# Patient Record
Sex: MALE | Race: WHITE | Employment: PART TIME | URBAN - METROPOLITAN AREA
[De-identification: names, ages, dates, MRNs, and addresses within clinical notes are randomized per-mention and may not be internally consistent; named-entity substitution may affect disease eponyms.]

---

## 2017-03-06 ENCOUNTER — HOSPITAL ENCOUNTER (OUTPATIENT)
Dept: OTHER | Age: 67
Discharge: HOME | End: 2017-03-06
Attending: SURGERY | Admitting: SURGERY

## 2017-03-06 DIAGNOSIS — N20.0 URIC ACID NEPHROLITHIASIS: ICD-10-CM

## 2017-03-16 ENCOUNTER — HOSPITAL ENCOUNTER (OUTPATIENT)
Dept: OTHER | Age: 67
Discharge: HOME | End: 2017-03-16
Attending: SURGERY | Admitting: SURGERY

## 2017-03-16 DIAGNOSIS — N20.0 CALCULUS OF KIDNEY: ICD-10-CM

## 2017-11-10 ENCOUNTER — OFFICE VISIT (OUTPATIENT)
Dept: FAMILY MEDICINE CLINIC | Age: 67
End: 2017-11-10

## 2017-11-10 VITALS
WEIGHT: 205 LBS | HEART RATE: 68 BPM | HEIGHT: 70 IN | SYSTOLIC BLOOD PRESSURE: 124 MMHG | BODY MASS INDEX: 29.35 KG/M2 | DIASTOLIC BLOOD PRESSURE: 70 MMHG | OXYGEN SATURATION: 97 % | RESPIRATION RATE: 22 BRPM | TEMPERATURE: 97.6 F

## 2017-11-10 DIAGNOSIS — Z87.438 HISTORY OF PROSTATITIS: ICD-10-CM

## 2017-11-10 DIAGNOSIS — Z87.442 HISTORY OF NEPHROLITHIASIS: ICD-10-CM

## 2017-11-10 DIAGNOSIS — E11.8 TYPE 2 DIABETES MELLITUS WITH COMPLICATION, WITHOUT LONG-TERM CURRENT USE OF INSULIN (HCC): ICD-10-CM

## 2017-11-10 DIAGNOSIS — E11.8 TYPE 2 DIABETES MELLITUS WITH COMPLICATION, WITHOUT LONG-TERM CURRENT USE OF INSULIN (HCC): Primary | ICD-10-CM

## 2017-11-10 DIAGNOSIS — Z85.828 HISTORY OF BASAL CELL CARCINOMA: ICD-10-CM

## 2017-11-10 LAB
A/G RATIO: 1.7 (ref 1.1–2.2)
ALBUMIN SERPL-MCNC: 4.7 G/DL (ref 3.4–5)
ALP BLD-CCNC: 52 U/L (ref 40–129)
ALT SERPL-CCNC: 37 U/L (ref 10–40)
ANION GAP SERPL CALCULATED.3IONS-SCNC: 16 MMOL/L (ref 3–16)
AST SERPL-CCNC: 36 U/L (ref 15–37)
BILIRUB SERPL-MCNC: 0.3 MG/DL (ref 0–1)
BUN BLDV-MCNC: 25 MG/DL (ref 7–20)
CALCIUM SERPL-MCNC: 10 MG/DL (ref 8.3–10.6)
CHLORIDE BLD-SCNC: 103 MMOL/L (ref 99–110)
CHOLESTEROL, TOTAL: 136 MG/DL (ref 0–199)
CO2: 25 MMOL/L (ref 21–32)
CREAT SERPL-MCNC: 1 MG/DL (ref 0.8–1.3)
CREATININE URINE: 117.2 MG/DL (ref 39–259)
ESTIMATED AVERAGE GLUCOSE: 162.8 MG/DL
GFR AFRICAN AMERICAN: >60
GFR NON-AFRICAN AMERICAN: >60
GLOBULIN: 2.8 G/DL
GLUCOSE BLD-MCNC: 155 MG/DL (ref 70–99)
HBA1C MFR BLD: 7.3 %
HDLC SERPL-MCNC: 50 MG/DL (ref 40–60)
LDL CHOLESTEROL CALCULATED: 56 MG/DL
MICROALBUMIN UR-MCNC: 2.2 MG/DL
MICROALBUMIN/CREAT UR-RTO: 18.8 MG/G (ref 0–30)
POTASSIUM SERPL-SCNC: 4.5 MMOL/L (ref 3.5–5.1)
SODIUM BLD-SCNC: 144 MMOL/L (ref 136–145)
TOTAL PROTEIN: 7.5 G/DL (ref 6.4–8.2)
TRIGL SERPL-MCNC: 152 MG/DL (ref 0–150)
TSH SERPL DL<=0.05 MIU/L-ACNC: 5.46 UIU/ML (ref 0.27–4.2)
VITAMIN B-12: 357 PG/ML (ref 211–911)
VLDLC SERPL CALC-MCNC: 30 MG/DL

## 2017-11-10 PROCEDURE — 99204 OFFICE O/P NEW MOD 45 MIN: CPT | Performed by: FAMILY MEDICINE

## 2017-11-10 RX ORDER — CHLORTHALIDONE 25 MG/1
25 TABLET ORAL DAILY
COMMUNITY
End: 2019-03-08 | Stop reason: SDUPTHER

## 2017-11-10 RX ORDER — TAMSULOSIN HYDROCHLORIDE 0.4 MG/1
0.4 CAPSULE ORAL DAILY
COMMUNITY
End: 2018-04-03 | Stop reason: SDUPTHER

## 2017-11-10 RX ORDER — ATORVASTATIN CALCIUM 40 MG/1
40 TABLET, FILM COATED ORAL DAILY
COMMUNITY
End: 2018-03-27 | Stop reason: SDUPTHER

## 2017-11-10 RX ORDER — GLIPIZIDE 10 MG/1
10 TABLET ORAL
COMMUNITY
End: 2018-02-21 | Stop reason: SDUPTHER

## 2017-11-10 RX ORDER — ASPIRIN 81 MG/1
81 TABLET ORAL DAILY
COMMUNITY
End: 2022-04-13

## 2017-11-10 RX ORDER — SILDENAFIL CITRATE 20 MG/1
TABLET ORAL
Qty: 30 TABLET | Refills: 3 | Status: SHIPPED | OUTPATIENT
Start: 2017-11-10 | End: 2018-09-07 | Stop reason: SDUPTHER

## 2017-11-10 RX ORDER — FINASTERIDE 5 MG/1
5 TABLET, FILM COATED ORAL DAILY
COMMUNITY
End: 2018-02-14 | Stop reason: SDUPTHER

## 2017-11-10 RX ORDER — LOSARTAN POTASSIUM 25 MG/1
25 TABLET ORAL DAILY
COMMUNITY
End: 2018-05-02 | Stop reason: SDUPTHER

## 2017-11-10 ASSESSMENT — PATIENT HEALTH QUESTIONNAIRE - PHQ9
SUM OF ALL RESPONSES TO PHQ QUESTIONS 1-9: 0
2. FEELING DOWN, DEPRESSED OR HOPELESS: 0
1. LITTLE INTEREST OR PLEASURE IN DOING THINGS: 0
SUM OF ALL RESPONSES TO PHQ9 QUESTIONS 1 & 2: 0

## 2017-11-10 NOTE — PROGRESS NOTES
10/28/2015    Flu vaccine (1) 09/01/2017    Diabetes screen  11/10/2020    Lipid screen  11/10/2022      Patient Active Problem List   Diagnosis    Cervical spine fracture (HCC)    HTN (hypertension)    History of nephrolithiasis    History of basal cell carcinoma    History of prostatitis        LABS:  Lab Results   Component Value Date    GLUCOSE 155 (H) 11/10/2017     Lab Results   Component Value Date     11/10/2017    K 4.5 11/10/2017    CREATININE 1.0 11/10/2017     Cholesterol, Total   Date Value Ref Range Status   11/10/2017 136 0 - 199 mg/dL Final     LDL Calculated   Date Value Ref Range Status   11/10/2017 56 <100 mg/dL Final     HDL   Date Value Ref Range Status   11/10/2017 50 40 - 60 mg/dL Final     Triglycerides   Date Value Ref Range Status   11/10/2017 152 (H) 0 - 150 mg/dL Final     Lab Results   Component Value Date    ALT 37 11/10/2017    AST 36 11/10/2017    ALKPHOS 52 11/10/2017    BILITOT 0.3 11/10/2017      Lab Results   Component Value Date    WBC 16.6 (H) 03/27/2014    HGB 12.7 (L) 03/27/2014    HCT 38.5 (L) 03/27/2014    MCV 87.4 03/27/2014     03/27/2014        PHYSICAL EXAM  /70 (Site: Left Arm, Position: Sitting, Cuff Size: Medium Adult)   Pulse 68   Temp 97.6 °F (36.4 °C) (Oral)   Resp 22   Ht 5' 10\" (1.778 m)   Wt 205 lb (93 kg)   SpO2 97%   BMI 29.41 kg/m²     BP Readings from Last 3 Encounters:   11/10/17 124/70   03/27/14 124/70   11/12/13 (!) 88/42       Wt Readings from Last 3 Encounters:   11/10/17 205 lb (93 kg)   03/27/14 210 lb (95.3 kg)   11/11/13 215 lb (97.5 kg)        Physical Exam   Constitutional: He is oriented to person, place, and time. He appears well-developed and well-nourished. HENT:   Head: Normocephalic and atraumatic. Eyes: EOM are normal.   Neck: Normal range of motion. Cardiovascular: Normal rate, regular rhythm and normal heart sounds.     Pulmonary/Chest: Effort normal and breath sounds normal.   Musculoskeletal:

## 2017-11-11 ENCOUNTER — TELEPHONE (OUTPATIENT)
Dept: FAMILY MEDICINE CLINIC | Age: 67
End: 2017-11-11

## 2017-11-11 PROBLEM — E11.8 TYPE 2 DIABETES MELLITUS WITH COMPLICATION, WITHOUT LONG-TERM CURRENT USE OF INSULIN (HCC): Status: ACTIVE | Noted: 2017-11-11

## 2017-11-11 ASSESSMENT — ENCOUNTER SYMPTOMS
COUGH: 0
NAUSEA: 0
SINUS PRESSURE: 0
SHORTNESS OF BREATH: 0
EYE REDNESS: 0
SORE THROAT: 0
COLOR CHANGE: 0
VOMITING: 0
DIARRHEA: 0

## 2017-11-11 NOTE — TELEPHONE ENCOUNTER
----- Message from Michael Lang MD sent at 11/10/2017  5:42 PM EST -----  Please inform pt that sildenafil rx was denied by insurance. Best option may be to pay out-of-pocket at [facility].  Thanks

## 2017-11-13 ENCOUNTER — TELEPHONE (OUTPATIENT)
Dept: FAMILY MEDICINE CLINIC | Age: 67
End: 2017-11-13

## 2017-11-13 DIAGNOSIS — E03.8 SUBCLINICAL HYPOTHYROIDISM: ICD-10-CM

## 2017-11-13 DIAGNOSIS — E11.8 TYPE 2 DIABETES MELLITUS WITH COMPLICATION, UNSPECIFIED LONG TERM INSULIN USE STATUS: Primary | ICD-10-CM

## 2017-11-13 NOTE — TELEPHONE ENCOUNTER
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 Suzi Hartley call concerning his labs.     I transferred call to Dr. Suzi Hartley

## 2017-11-15 ENCOUNTER — TELEPHONE (OUTPATIENT)
Dept: FAMILY MEDICINE CLINIC | Age: 67
End: 2017-11-15

## 2017-11-28 ENCOUNTER — TELEPHONE (OUTPATIENT)
Dept: FAMILY MEDICINE CLINIC | Age: 67
End: 2017-11-28

## 2017-11-28 NOTE — TELEPHONE ENCOUNTER
Pt needs a refill on his hydroquinone-kojic acid-retinoic acid in triamcinolone 6%-6%-0.05% cream  This needs to go to the compounding center   [address]  [phone]

## 2018-02-14 RX ORDER — FINASTERIDE 5 MG/1
5 TABLET, FILM COATED ORAL DAILY
Qty: 30 TABLET | Refills: 5 | Status: SHIPPED | OUTPATIENT
Start: 2018-02-14 | End: 2018-04-27 | Stop reason: SDUPTHER

## 2018-02-21 ENCOUNTER — TELEPHONE (OUTPATIENT)
Dept: FAMILY MEDICINE CLINIC | Age: 68
End: 2018-02-21

## 2018-02-21 RX ORDER — GLIPIZIDE 10 MG/1
10 TABLET ORAL
Qty: 60 TABLET | Refills: 5 | Status: SHIPPED | OUTPATIENT
Start: 2018-02-21 | End: 2018-04-04 | Stop reason: SDUPTHER

## 2018-03-20 ENCOUNTER — OFFICE VISIT (OUTPATIENT)
Dept: DERMATOLOGY | Age: 68
End: 2018-03-20

## 2018-03-20 DIAGNOSIS — L81.4 SOLAR LENTIGINOSIS: ICD-10-CM

## 2018-03-20 DIAGNOSIS — Z85.828 HISTORY OF BASAL CELL CARCINOMA: ICD-10-CM

## 2018-03-20 DIAGNOSIS — L57.0 AK (ACTINIC KERATOSIS): Primary | ICD-10-CM

## 2018-03-20 PROCEDURE — G8484 FLU IMMUNIZE NO ADMIN: HCPCS | Performed by: DERMATOLOGY

## 2018-03-20 PROCEDURE — 17000 DESTRUCT PREMALG LESION: CPT | Performed by: DERMATOLOGY

## 2018-03-20 PROCEDURE — 3017F COLORECTAL CA SCREEN DOC REV: CPT | Performed by: DERMATOLOGY

## 2018-03-20 PROCEDURE — 4040F PNEUMOC VAC/ADMIN/RCVD: CPT | Performed by: DERMATOLOGY

## 2018-03-20 PROCEDURE — 1123F ACP DISCUSS/DSCN MKR DOCD: CPT | Performed by: DERMATOLOGY

## 2018-03-20 PROCEDURE — 99202 OFFICE O/P NEW SF 15 MIN: CPT | Performed by: DERMATOLOGY

## 2018-03-20 PROCEDURE — G8427 DOCREV CUR MEDS BY ELIG CLIN: HCPCS | Performed by: DERMATOLOGY

## 2018-03-20 PROCEDURE — G8419 CALC BMI OUT NRM PARAM NOF/U: HCPCS | Performed by: DERMATOLOGY

## 2018-03-20 PROCEDURE — 1036F TOBACCO NON-USER: CPT | Performed by: DERMATOLOGY

## 2018-03-26 DIAGNOSIS — E11.8 TYPE 2 DIABETES MELLITUS WITH COMPLICATION, UNSPECIFIED LONG TERM INSULIN USE STATUS: ICD-10-CM

## 2018-03-27 RX ORDER — ATORVASTATIN CALCIUM 40 MG/1
40 TABLET, FILM COATED ORAL DAILY
Qty: 30 TABLET | Refills: 3 | Status: SHIPPED | OUTPATIENT
Start: 2018-03-27 | End: 2018-04-04 | Stop reason: SDUPTHER

## 2018-03-27 RX ORDER — EMPAGLIFLOZIN 10 MG/1
TABLET, FILM COATED ORAL
Qty: 30 TABLET | Refills: 3 | Status: SHIPPED | OUTPATIENT
Start: 2018-03-27 | End: 2018-04-04 | Stop reason: SDUPTHER

## 2018-04-03 RX ORDER — TAMSULOSIN HYDROCHLORIDE 0.4 MG/1
0.4 CAPSULE ORAL DAILY
Qty: 30 CAPSULE | Refills: 3 | Status: SHIPPED | OUTPATIENT
Start: 2018-04-03 | End: 2018-09-07

## 2018-04-04 DIAGNOSIS — E11.8 TYPE 2 DIABETES MELLITUS WITH COMPLICATION, UNSPECIFIED LONG TERM INSULIN USE STATUS: ICD-10-CM

## 2018-04-04 RX ORDER — GLIPIZIDE 10 MG/1
10 TABLET ORAL
Qty: 60 TABLET | Refills: 5 | Status: SHIPPED | OUTPATIENT
Start: 2018-04-04 | End: 2018-04-27 | Stop reason: SDUPTHER

## 2018-04-04 RX ORDER — ATORVASTATIN CALCIUM 40 MG/1
40 TABLET, FILM COATED ORAL DAILY
Qty: 30 TABLET | Refills: 3 | Status: SHIPPED | OUTPATIENT
Start: 2018-04-04 | End: 2018-04-27 | Stop reason: SDUPTHER

## 2018-04-27 DIAGNOSIS — E11.8 TYPE 2 DIABETES MELLITUS WITH COMPLICATION, UNSPECIFIED LONG TERM INSULIN USE STATUS: ICD-10-CM

## 2018-04-27 RX ORDER — GLIPIZIDE 10 MG/1
10 TABLET ORAL
Qty: 60 TABLET | Refills: 5 | Status: SHIPPED | OUTPATIENT
Start: 2018-04-27 | End: 2018-07-26 | Stop reason: SDUPTHER

## 2018-04-27 RX ORDER — FINASTERIDE 5 MG/1
5 TABLET, FILM COATED ORAL DAILY
Qty: 30 TABLET | Refills: 5 | Status: SHIPPED | OUTPATIENT
Start: 2018-04-27 | End: 2019-03-09 | Stop reason: SDUPTHER

## 2018-04-27 RX ORDER — ATORVASTATIN CALCIUM 40 MG/1
40 TABLET, FILM COATED ORAL DAILY
Qty: 30 TABLET | Refills: 3 | Status: SHIPPED | OUTPATIENT
Start: 2018-04-27 | End: 2018-07-23 | Stop reason: SDUPTHER

## 2018-07-24 RX ORDER — ATORVASTATIN CALCIUM 40 MG/1
40 TABLET, FILM COATED ORAL DAILY
Qty: 30 TABLET | Refills: 3 | Status: SHIPPED | OUTPATIENT
Start: 2018-07-24 | End: 2019-04-11 | Stop reason: SDUPTHER

## 2018-07-26 RX ORDER — GLIPIZIDE 10 MG/1
10 TABLET ORAL
Qty: 60 TABLET | Refills: 5 | Status: SHIPPED | OUTPATIENT
Start: 2018-07-26 | End: 2018-08-15 | Stop reason: SDUPTHER

## 2018-07-27 RX ORDER — MINOCYCLINE HYDROCHLORIDE 100 MG/1
100 TABLET ORAL 2 TIMES DAILY
Qty: 60 TABLET | Refills: 2 | Status: SHIPPED | OUTPATIENT
Start: 2018-07-27 | End: 2019-03-08 | Stop reason: CLARIF

## 2018-08-15 RX ORDER — GLIPIZIDE 10 MG/1
10 TABLET ORAL
Qty: 60 TABLET | Refills: 5 | Status: SHIPPED | OUTPATIENT
Start: 2018-08-15 | End: 2019-02-06 | Stop reason: SDUPTHER

## 2018-08-21 ENCOUNTER — TELEPHONE (OUTPATIENT)
Dept: DERMATOLOGY | Age: 68
End: 2018-08-21

## 2018-08-21 NOTE — TELEPHONE ENCOUNTER
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 He said that it will start to heal and then open back up again. He is wondering when Dr. Almita Chavarria would be able to see him? He does practice down the street on Monday, Wednesday and Thursday and he is more open on Tuesday and Friday but he will look over his schedule if you have times in mind. You can reach him at [phone].

## 2018-08-28 ENCOUNTER — OFFICE VISIT (OUTPATIENT)
Dept: DERMATOLOGY | Age: 68
End: 2018-08-28

## 2018-08-28 DIAGNOSIS — D48.5 NEOPLASM OF UNCERTAIN BEHAVIOR OF SKIN: Primary | ICD-10-CM

## 2018-08-28 DIAGNOSIS — L57.0 AK (ACTINIC KERATOSIS): ICD-10-CM

## 2018-08-28 PROCEDURE — 11100 PR BIOPSY OF SKIN LESION: CPT | Performed by: DERMATOLOGY

## 2018-08-28 PROCEDURE — 17003 DESTRUCT PREMALG LES 2-14: CPT | Performed by: DERMATOLOGY

## 2018-08-28 PROCEDURE — 17000 DESTRUCT PREMALG LESION: CPT | Performed by: DERMATOLOGY

## 2018-08-28 NOTE — PATIENT INSTRUCTIONS

## 2018-08-28 NOTE — PROGRESS NOTES
[facility]  MD [address]  1950    79 y.o. male     Date of Visit: 8/28/2018    Chief Complaint: skin lesions    History of Present Illness:    1. He presents today for a few month history of a recurrently bleeding and scabbing lesion on the nasal tip. 2.  Unknown duration of persistent scaly lesions on the face. 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 Has a home in [place]. Derm Hx:  Nodular BCC of the left nasal alatreated with Mohs by Dr. Joseph Guillen in April 2014. Superficial BCC of the right shouldertreated with curettage in April 2014. BCC of the right mid cheektreated with Mohs by Dr. Joseph Guillen in Sept 2012. Pigmented nodular BCC the right mandibletreated with Mohs in Dec 2011. Superficial BCC of the right cheektreated with Mohs by Dr. Joseph Guillen in December 2009. Review of Systems:  Skin: No new or changing moles. Past Medical History, Family History, Surgical History, Medications and Allergies reviewed.     Past Medical History:   Diagnosis Date    Fracture cervical vertebra-closed ([facility] [address] 3/16/13     Past Surgical History:   Procedure Laterality Date    HERNIA REPAIR      TONSILLECTOMY         No Known Allergies  Outpatient Prescriptions Marked as Taking for the 8/28/18 encounter (Office Visit) with Urbano Najjar, MD   Medication Sig Dispense Refill    VITAMIN A PO Take by mouth      GLUCOSAMINE-CHONDROITIN PO Take by mouth      glipiZIDE (GLUCOTROL) 10 MG tablet Take 1 tablet by mouth 2 times daily (before meals) 60 tablet 5    minocycline (DYNACIN) 100 MG tablet Take 1 tablet by mouth 2 times daily 60 tablet 2    atorvastatin (LIPITOR) 40 MG tablet Take 1 tablet by mouth daily 30 tablet 3    losartan (COZAAR) 25 MG tablet Take 1 tablet by mouth daily 90 tablet 1    finasteride (PROSCAR) 5 MG tablet Take 1 tablet by mouth daily 30 tablet 5    empagliflozin (JARDIANCE) 10 MG tablet TAKE ONE TABLET BY MOUTH ONCE DAILY 30 tablet 3    sitaGLIPtan-metformin (JANUMET)  MG per tablet Take 1 tablet by mouth 2 times daily (with meals) 60 tablet 5    tamsulosin (FLOMAX) 0.4 MG capsule Take 1 capsule by mouth daily 30 capsule 3    aspirin EC 81 MG EC tablet Take 81 mg by mouth daily      HYDROQUINONE EX Apply topically      chlorthalidone (HYGROTON) 25 MG tablet Take 25 mg by mouth daily      sildenafil (REVATIO) 20 MG tablet Use daily PRN intercourse 30 tablet 3    Cholecalciferol (VITAMIN D-3) 5000 UNITS TABS Take 1 tablet by mouth daily.  vitamin E 1000 UNITS capsule Take 1,000 Units by mouth daily.  ibuprofen (ADVIL;MOTRIN) 200 MG tablet Take 600 mg by mouth every 8 hours as needed for Pain or Fever. 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 Physical Examination       Well appearing. 1.  Right nasal tip - 6 mm ulcerated telangiectatic pearly papule. 2.  Right temple - 1, right forehead - 2, central upper forehead - 2, left side of the forehead - 3: ill defined irreg shaped keratotic pink macules. Assessment and Plan     1. Neoplasm of uncertain behavior of skin, right nasal tip - r/o BCC    Discussed possible diagnosis; patient agreeable to biopsy (verbal consent obtained). The area(s) to be biopsied were marked with a surgical pen. Alcohol was used to cleanse the site. Local anesthesia was acheived with 1% lidocaine with epinephrine. Shave biopsy was performed using a razor blade. Hemostasis was achieved with aluminum chloride. The wound(s) were dressed with petrolatum and covered with a bandage. Wound care instructions were reviewed. 1 Specimen (s) sent to pathology. The specimen bottles were appropriately labeled. We also reviewed the risks of bleeding, scar, and infection. Mohs w/ Vidales if positive.        2. AK (actinic keratosis) -     2 cycles of liquid nitrogen applied to 8 AKs: Right temple - 1,

## 2018-09-04 DIAGNOSIS — C44.311 BASAL CELL CARCINOMA (BCC) OF NASAL TIP: Primary | ICD-10-CM

## 2018-09-07 ENCOUNTER — OFFICE VISIT (OUTPATIENT)
Dept: FAMILY MEDICINE CLINIC | Age: 68
End: 2018-09-07

## 2018-09-07 VITALS
WEIGHT: 203 LBS | DIASTOLIC BLOOD PRESSURE: 70 MMHG | SYSTOLIC BLOOD PRESSURE: 116 MMHG | TEMPERATURE: 98.8 F | BODY MASS INDEX: 29.06 KG/M2 | HEART RATE: 63 BPM | HEIGHT: 70 IN | OXYGEN SATURATION: 97 % | RESPIRATION RATE: 15 BRPM

## 2018-09-07 DIAGNOSIS — E11.8 TYPE 2 DIABETES MELLITUS WITH COMPLICATION, WITHOUT LONG-TERM CURRENT USE OF INSULIN (HCC): ICD-10-CM

## 2018-09-07 DIAGNOSIS — N52.8 OTHER MALE ERECTILE DYSFUNCTION: ICD-10-CM

## 2018-09-07 DIAGNOSIS — I10 ESSENTIAL HYPERTENSION: ICD-10-CM

## 2018-09-07 DIAGNOSIS — N40.0 BENIGN PROSTATIC HYPERPLASIA WITHOUT LOWER URINARY TRACT SYMPTOMS: ICD-10-CM

## 2018-09-07 DIAGNOSIS — E11.8 TYPE 2 DIABETES MELLITUS WITH COMPLICATION, WITHOUT LONG-TERM CURRENT USE OF INSULIN (HCC): Primary | ICD-10-CM

## 2018-09-07 LAB
A/G RATIO: 1.8 (ref 1.1–2.2)
ALBUMIN SERPL-MCNC: 4.7 G/DL (ref 3.4–5)
ALP BLD-CCNC: 49 U/L (ref 40–129)
ALT SERPL-CCNC: 33 U/L (ref 10–40)
ANION GAP SERPL CALCULATED.3IONS-SCNC: 15 MMOL/L (ref 3–16)
AST SERPL-CCNC: 33 U/L (ref 15–37)
BASOPHILS ABSOLUTE: 0 K/UL (ref 0–0.2)
BASOPHILS RELATIVE PERCENT: 0.4 %
BILIRUB SERPL-MCNC: 0.5 MG/DL (ref 0–1)
BUN BLDV-MCNC: 27 MG/DL (ref 7–20)
CALCIUM SERPL-MCNC: 9.7 MG/DL (ref 8.3–10.6)
CHLORIDE BLD-SCNC: 105 MMOL/L (ref 99–110)
CHOLESTEROL, TOTAL: 133 MG/DL (ref 0–199)
CO2: 22 MMOL/L (ref 21–32)
CREAT SERPL-MCNC: 1 MG/DL (ref 0.8–1.3)
CREATININE URINE: 101.6 MG/DL (ref 39–259)
EOSINOPHILS ABSOLUTE: 0.3 K/UL (ref 0–0.6)
EOSINOPHILS RELATIVE PERCENT: 4.1 %
GFR AFRICAN AMERICAN: >60
GFR NON-AFRICAN AMERICAN: >60
GLOBULIN: 2.6 G/DL
GLUCOSE BLD-MCNC: 152 MG/DL (ref 70–99)
HBA1C MFR BLD: 7.3 %
HCT VFR BLD CALC: 43.4 % (ref 40.5–52.5)
HDLC SERPL-MCNC: 46 MG/DL (ref 40–60)
HEMOGLOBIN: 14.7 G/DL (ref 13.5–17.5)
LDL CHOLESTEROL CALCULATED: 62 MG/DL
LYMPHOCYTES ABSOLUTE: 1 K/UL (ref 1–5.1)
LYMPHOCYTES RELATIVE PERCENT: 15.5 %
MCH RBC QN AUTO: 30.5 PG (ref 26–34)
MCHC RBC AUTO-ENTMCNC: 33.8 G/DL (ref 31–36)
MCV RBC AUTO: 90.2 FL (ref 80–100)
MICROALBUMIN UR-MCNC: <1.2 MG/DL
MICROALBUMIN/CREAT UR-RTO: NORMAL MG/G (ref 0–30)
MONOCYTES ABSOLUTE: 0.6 K/UL (ref 0–1.3)
MONOCYTES RELATIVE PERCENT: 9 %
NEUTROPHILS ABSOLUTE: 4.7 K/UL (ref 1.7–7.7)
NEUTROPHILS RELATIVE PERCENT: 71 %
PDW BLD-RTO: 15 % (ref 12.4–15.4)
PLATELET # BLD: 158 K/UL (ref 135–450)
PMV BLD AUTO: 8.9 FL (ref 5–10.5)
POTASSIUM SERPL-SCNC: 4.3 MMOL/L (ref 3.5–5.1)
PROSTATE SPECIFIC ANTIGEN: 0.67 NG/ML (ref 0–4)
RBC # BLD: 4.82 M/UL (ref 4.2–5.9)
SODIUM BLD-SCNC: 142 MMOL/L (ref 136–145)
TOTAL PROTEIN: 7.3 G/DL (ref 6.4–8.2)
TRIGL SERPL-MCNC: 123 MG/DL (ref 0–150)
TSH SERPL DL<=0.05 MIU/L-ACNC: 3.28 UIU/ML (ref 0.27–4.2)
VLDLC SERPL CALC-MCNC: 25 MG/DL
WBC # BLD: 6.6 K/UL (ref 4–11)

## 2018-09-07 PROCEDURE — 3045F PR MOST RECENT HEMOGLOBIN A1C LEVEL 7.0-9.0%: CPT | Performed by: FAMILY MEDICINE

## 2018-09-07 PROCEDURE — 4040F PNEUMOC VAC/ADMIN/RCVD: CPT | Performed by: FAMILY MEDICINE

## 2018-09-07 PROCEDURE — 2022F DILAT RTA XM EVC RTNOPTHY: CPT | Performed by: FAMILY MEDICINE

## 2018-09-07 PROCEDURE — 1101F PT FALLS ASSESS-DOCD LE1/YR: CPT | Performed by: FAMILY MEDICINE

## 2018-09-07 PROCEDURE — 3017F COLORECTAL CA SCREEN DOC REV: CPT | Performed by: FAMILY MEDICINE

## 2018-09-07 PROCEDURE — 1123F ACP DISCUSS/DSCN MKR DOCD: CPT | Performed by: FAMILY MEDICINE

## 2018-09-07 PROCEDURE — 99214 OFFICE O/P EST MOD 30 MIN: CPT | Performed by: FAMILY MEDICINE

## 2018-09-07 PROCEDURE — G8419 CALC BMI OUT NRM PARAM NOF/U: HCPCS | Performed by: FAMILY MEDICINE

## 2018-09-07 PROCEDURE — G8427 DOCREV CUR MEDS BY ELIG CLIN: HCPCS | Performed by: FAMILY MEDICINE

## 2018-09-07 PROCEDURE — 83036 HEMOGLOBIN GLYCOSYLATED A1C: CPT | Performed by: FAMILY MEDICINE

## 2018-09-07 PROCEDURE — 1036F TOBACCO NON-USER: CPT | Performed by: FAMILY MEDICINE

## 2018-09-07 RX ORDER — SILDENAFIL CITRATE 20 MG/1
TABLET ORAL
Qty: 90 TABLET | Refills: 3
Start: 2018-09-07 | End: 2019-09-06 | Stop reason: SDUPTHER

## 2018-09-07 ASSESSMENT — ENCOUNTER SYMPTOMS
SHORTNESS OF BREATH: 0
CONSTIPATION: 0
DIARRHEA: 0
COUGH: 0

## 2018-09-07 NOTE — PROGRESS NOTES
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     No Known Allergies    LABS:  Lab Results   Component Value Date    GLUCOSE 155 (H) 11/10/2017     Lab Results   Component Value Date     11/10/2017    K 4.5 11/10/2017    CREATININE 1.0 11/10/2017     Cholesterol, Total   Date Value Ref Range Status   11/10/2017 136 0 - 199 mg/dL Final     LDL Calculated   Date Value Ref Range Status   11/10/2017 56 <100 mg/dL Final     HDL   Date Value Ref Range Status   11/10/2017 50 40 - 60 mg/dL Final     Triglycerides   Date Value Ref Range Status   11/10/2017 152 (H) 0 - 150 mg/dL Final     Lab Results   Component Value Date    ALT 37 11/10/2017    AST 36 11/10/2017    ALKPHOS 52 11/10/2017    BILITOT 0.3 11/10/2017      Lab Results   Component Value Date    WBC 6.6 09/07/2018    HGB 14.7 09/07/2018    HCT 43.4 09/07/2018    MCV 90.2 09/07/2018     09/07/2018     TSH (uIU/mL)   Date Value   11/10/2017 5.46 (H)     Lab Results   Component Value Date    LABA1C 7.3 09/07/2018     No results found for: PSA, PSADIA      PHYSICAL EXAM  /70 (Site: Right Upper Arm, Position: Sitting, Cuff Size: Large Adult)   Pulse 63   Temp 98.8 °F (37.1 °C) (Oral)   Resp 15   Ht 5' 10\" (1.778 m)   Wt 203 lb (92.1 kg)   SpO2 97%   BMI 29.13 kg/m²     BP Readings from Last 5 Encounters:   09/07/18 116/70   11/10/17 124/70   03/27/14 124/70   11/12/13 (!) 88/42   03/18/13 176/86       Wt Readings from Last 5 Encounters:   09/07/18 203 lb (92.1 kg)   11/10/17 205 lb (93 kg)   03/27/14 210 lb (95.3 kg)   11/11/13 215 lb (97.5 kg)   03/16/13 218 lb (98.9 kg)        Physical Exam   Constitutional: He is oriented to person, place, and time. He appears well-developed and well-nourished. HENT:   Head: Normocephalic and atraumatic. Eyes: EOM are normal.   Neck: Normal range of motion. Cardiovascular: Normal rate, regular rhythm and normal heart sounds.     Pulmonary/Chest:

## 2018-09-11 LAB
SEX HORMONE BINDING GLOBULIN: 42 NMOL/L (ref 11–80)
TESTOSTERONE FREE-NONMALE: 45.6 PG/ML (ref 47–244)
TESTOSTERONE TOTAL: 272 NG/DL (ref 220–1000)

## 2018-10-02 ENCOUNTER — PROCEDURE VISIT (OUTPATIENT)
Dept: SURGERY | Age: 68
End: 2018-10-02
Payer: MEDICARE

## 2018-10-02 VITALS
SYSTOLIC BLOOD PRESSURE: 138 MMHG | WEIGHT: 202 LBS | HEART RATE: 64 BPM | DIASTOLIC BLOOD PRESSURE: 78 MMHG | OXYGEN SATURATION: 97 % | TEMPERATURE: 98.3 F | BODY MASS INDEX: 28.98 KG/M2

## 2018-10-02 DIAGNOSIS — C44.311 BASAL CELL CARCINOMA OF RIGHT NASAL TIP: Primary | ICD-10-CM

## 2018-10-02 PROCEDURE — 17311 MOHS 1 STAGE H/N/HF/G: CPT | Performed by: DERMATOLOGY

## 2018-10-02 PROCEDURE — 14060 TIS TRNFR E/N/E/L 10 SQ CM/<: CPT | Performed by: DERMATOLOGY

## 2018-10-02 NOTE — PATIENT INSTRUCTIONS
[facility] Office Hours:    Monday-Thursday  7:30 AM-4:30 PM    Friday  9:00 AM-3:00 PM    POST-OPERATIVE CARE FOR STITCHES  Bandage change in 48 hours    CARING FOR YOUR SURGICAL SITE  Keep our bandage on and dry for the first 48 hours. Do not get the bandage wet. 1.  After 48 hours, gently remove the remaining part of the bandage. It can be helpful to moisten the bandage edges in the shower. 2.  Gently clean the wound daily with mild soap and water. Try to clean off crust and debris. 3.  Dry (pat) the area with a clean Q-tip or gauze. 4.  Apply a layer of Vaseline (or Bacitracin if your doctor recommends) to the wound area only. 5.  Cut a piece of Telfa (or any non-stick dressing) to fit just over the wound and secure it with paper tape. If the wound is small you may use a Band-Aid. If the dressing comes off or if you have questions, or concerns about the dressing, please call the office for instructions! POST-OPERATIVE INSTRUCTIONS        1. Activity: Do not lift anything heavier than a gallon of milk for 1 week. Also, avoid strenuous activity such as running, power walking or contact sports. 2.  Eating and drinking: Do not drink alcohol for 48 hours after your procedure. Alcohol increases the chances of bleeding. 3.  Medicines                -If you have discomfort, take acetaminophen (Tylenol or Extra Strength Tylenol). Follow the instructions and warning on the bottle. -If your doctor has prescribed you an aspirin daily, please keep taking it. Do not take extra aspirin or medicines containing aspirin (such as Yasmine-Ashley and Excedrin) for 48 hours after your procedure. ? Bleeding: If bleeding occurs, DO NOT remove the bandage. Put firm pressure on the area with gauze for 20 minutes without peaking. If the bleeding continues, apply pressure for 20 minutes more.   ? If the bleeding does not stop after you apply pressure, call us right away. If you cant call, go to the nearest emergency room or urgent care facility. What to Expect:  You may have these symptoms. They are normal and should get better with time:        1. Swelling. Swelling usually increases for 24 to 48 hours after your procedure and then begins to improve. Some soreness and redness around your wound. 2.  Bruising, which could last 1 week or more. 3.  Pink and bumpy appearance to the scar. This may happen a few weeks after your  procedure. After 4 weeks, you may gently massage the area each day with a facial moisturizer or petroleum jelly (Vaseline) or Aquaphor. This will help to                  smooth the skin and improve the appearance of the scar. The color of your scar will fade over time, but may be pink for several months after the procedure. The scar may take 6 months to 1 year to reach its final color and appearance. 4.  Spitting suture. Occasionally, an inside suture (stitch) does not completely dissolve. When this happens, (generally 4-8 weeks after surgery), it causes a bump or pimple to form on the scar. This is easily removed and is not at all                     serious. It does not mean the skin cancer has returned. Contact us if it happens, but do not be alarmed. Vitamin E oil is NOT necessary. A good moisturizer is just as effective. Sunscreen IS necessary. Use at least an SPF 30 sunscreen daily- even in the winter.      Call us at [phone] right away if you have any of the following symptoms:   Bleeding that you cant stop (see above)   Pain that lasts longer than 48 hours   Your wound becomes more painful, red or hot   Bruising and swelling that does not improve within 48 hours or gets worse suddenly

## 2018-10-03 ENCOUNTER — TELEPHONE (OUTPATIENT)
Dept: SURGERY | Age: 68
End: 2018-10-03

## 2018-10-09 ENCOUNTER — OFFICE VISIT (OUTPATIENT)
Dept: SURGERY | Age: 68
End: 2018-10-09

## 2018-10-09 DIAGNOSIS — Z48.02 VISIT FOR SUTURE REMOVAL: Primary | ICD-10-CM

## 2018-10-09 PROCEDURE — 99024 POSTOP FOLLOW-UP VISIT: CPT | Performed by: DERMATOLOGY

## 2018-10-23 ENCOUNTER — OFFICE VISIT (OUTPATIENT)
Dept: SURGERY | Age: 68
End: 2018-10-23

## 2018-10-23 DIAGNOSIS — Z51.89 VISIT FOR WOUND CHECK: Primary | ICD-10-CM

## 2018-10-23 PROCEDURE — 99024 POSTOP FOLLOW-UP VISIT: CPT | Performed by: DERMATOLOGY

## 2018-11-06 ENCOUNTER — TELEPHONE (OUTPATIENT)
Dept: FAMILY MEDICINE CLINIC | Age: 68
End: 2018-11-06

## 2018-11-20 ENCOUNTER — OFFICE VISIT (OUTPATIENT)
Dept: SURGERY | Age: 68
End: 2018-11-20

## 2018-11-20 DIAGNOSIS — Z51.89 VISIT FOR WOUND CHECK: Primary | ICD-10-CM

## 2018-11-20 PROCEDURE — 99024 POSTOP FOLLOW-UP VISIT: CPT | Performed by: DERMATOLOGY

## 2018-11-20 NOTE — PATIENT INSTRUCTIONS
[facility] Office Hours:    Monday-Thursday  7:30 AM-4:30 PM    Friday  9:00 AM-3:00 PM      Wound Care Massaging Instruction    Starting at approximately two weeks following surgery, we often ask that our patients begin massaging their wound on a regular basis. We suggest 5 minutes every morning and 5 minutes every night using an emollient such as Aquaphor, Vaseline or Eucerin cream.  The sutures that were placed underneath the surface of the skin take about 70 days to dissolve, and during that time, they can cause lumps along the line of the scar. These lumps will eventually go away on their own, but the use of regular massage will help speed the process as well as help soften the scar tissue more quickly. Please continue to use sunscreen, SPF 45 or higher during this time.

## 2018-12-04 RX ORDER — LOSARTAN POTASSIUM 25 MG/1
TABLET ORAL
Qty: 90 TABLET | Refills: 1 | Status: SHIPPED | OUTPATIENT
Start: 2018-12-04 | End: 2019-06-14 | Stop reason: SDUPTHER

## 2018-12-18 ENCOUNTER — OFFICE VISIT (OUTPATIENT)
Dept: DERMATOLOGY | Age: 68
End: 2018-12-18
Payer: MEDICARE

## 2018-12-18 DIAGNOSIS — Z85.828 HISTORY OF BASAL CELL CARCINOMA (BCC): ICD-10-CM

## 2018-12-18 DIAGNOSIS — L85.3 XEROSIS CUTIS: Primary | ICD-10-CM

## 2018-12-18 DIAGNOSIS — L81.4 SOLAR LENTIGO: ICD-10-CM

## 2018-12-18 DIAGNOSIS — L57.0 AK (ACTINIC KERATOSIS): ICD-10-CM

## 2018-12-18 PROCEDURE — 99213 OFFICE O/P EST LOW 20 MIN: CPT | Performed by: DERMATOLOGY

## 2018-12-18 PROCEDURE — G8427 DOCREV CUR MEDS BY ELIG CLIN: HCPCS | Performed by: DERMATOLOGY

## 2018-12-18 PROCEDURE — 1036F TOBACCO NON-USER: CPT | Performed by: DERMATOLOGY

## 2018-12-18 PROCEDURE — 1123F ACP DISCUSS/DSCN MKR DOCD: CPT | Performed by: DERMATOLOGY

## 2018-12-18 PROCEDURE — 1101F PT FALLS ASSESS-DOCD LE1/YR: CPT | Performed by: DERMATOLOGY

## 2018-12-18 PROCEDURE — G8484 FLU IMMUNIZE NO ADMIN: HCPCS | Performed by: DERMATOLOGY

## 2018-12-18 PROCEDURE — 4040F PNEUMOC VAC/ADMIN/RCVD: CPT | Performed by: DERMATOLOGY

## 2018-12-18 PROCEDURE — G8419 CALC BMI OUT NRM PARAM NOF/U: HCPCS | Performed by: DERMATOLOGY

## 2018-12-18 PROCEDURE — 3017F COLORECTAL CA SCREEN DOC REV: CPT | Performed by: DERMATOLOGY

## 2018-12-18 NOTE — PROGRESS NOTES
25 MG tablet Take 25 mg by mouth daily 30 tablet 11    VITAMIN A PO Take by mouth      GLUCOSAMINE-CHONDROITIN PO Take by mouth      glipiZIDE (GLUCOTROL) 10 MG tablet Take 1 tablet by mouth 2 times daily (before meals) 60 tablet 5    minocycline (DYNACIN) 100 MG tablet Take 1 tablet by mouth 2 times daily 60 tablet 2    atorvastatin (LIPITOR) 40 MG tablet Take 1 tablet by mouth daily 30 tablet 3    finasteride (PROSCAR) 5 MG tablet Take 1 tablet by mouth daily 30 tablet 5    sitaGLIPtan-metformin (JANUMET)  MG per tablet Take 1 tablet by mouth 2 times daily (with meals) 60 tablet 5    aspirin EC 81 MG EC tablet Take 81 mg by mouth daily      HYDROQUINONE EX Apply topically      chlorthalidone (HYGROTON) 25 MG tablet Take 25 mg by mouth daily      Cholecalciferol (VITAMIN D-3) 5000 UNITS TABS Take 1 tablet by mouth daily.  vitamin E 1000 UNITS capsule Take 1,000 Units by mouth daily.  ibuprofen (ADVIL;MOTRIN) 200 MG tablet Take 600 mg by mouth every 8 hours as needed for Pain or Fever. Physical Examination       The following were examined and determined to be normal: Psych/Neuro, Scalp/hair, Head/face, Conjunctivae/eyelids, Gums/teeth/lips, Neck, Breast/axilla/chest and Nails/digits. The following were examined and determined to be abnormal: Abdomen, Back, RUE and LUE. Well appearing. 1.  Flanks, upper arms, back - mild scaling. 2.  Left temple - scaly tan pink macule. 3.  Shoulders - smooth slightly irreg shaped brown macules and patches. 4.  Nasal tip region - surgical scar. Assessment and Plan     1. Xerosis cutis     We discussed dry skin care measures including: use of a mild soap (like Dove, Olay or Cetaphil) and limiting use to intertriginous regions; addition of a moisturizer (preferrably cream based) to the trunk and extremities immediately after showering. 2. AK (actinic keratosis) - small and asymptomatic    Observe.       3. Solar

## 2019-01-30 ENCOUNTER — TELEPHONE (OUTPATIENT)
Dept: DERMATOLOGY | Age: 69
End: 2019-01-30

## 2019-02-04 RX ORDER — SITAGLIPTIN AND METFORMIN HYDROCHLORIDE 1000; 50 MG/1; MG/1
TABLET, FILM COATED ORAL
Qty: 60 TABLET | Refills: 5 | Status: SHIPPED | OUTPATIENT
Start: 2019-02-04 | End: 2019-08-12 | Stop reason: SDUPTHER

## 2019-02-06 RX ORDER — GLIPIZIDE 10 MG/1
10 TABLET ORAL
Qty: 60 TABLET | Refills: 5 | Status: SHIPPED | OUTPATIENT
Start: 2019-02-06 | End: 2019-12-23

## 2019-03-08 ENCOUNTER — OFFICE VISIT (OUTPATIENT)
Dept: FAMILY MEDICINE CLINIC | Age: 69
End: 2019-03-08
Payer: MEDICARE

## 2019-03-08 VITALS
TEMPERATURE: 99.1 F | OXYGEN SATURATION: 98 % | HEIGHT: 70 IN | DIASTOLIC BLOOD PRESSURE: 74 MMHG | HEART RATE: 77 BPM | BODY MASS INDEX: 29.55 KG/M2 | SYSTOLIC BLOOD PRESSURE: 116 MMHG | WEIGHT: 206.4 LBS | RESPIRATION RATE: 14 BRPM

## 2019-03-08 DIAGNOSIS — Z87.438 HISTORY OF PROSTATITIS: ICD-10-CM

## 2019-03-08 DIAGNOSIS — N52.8 OTHER MALE ERECTILE DYSFUNCTION: ICD-10-CM

## 2019-03-08 DIAGNOSIS — Z87.442 HISTORY OF NEPHROLITHIASIS: ICD-10-CM

## 2019-03-08 DIAGNOSIS — R35.0 URINARY FREQUENCY: ICD-10-CM

## 2019-03-08 DIAGNOSIS — R35.1 BENIGN PROSTATIC HYPERPLASIA WITH NOCTURIA: ICD-10-CM

## 2019-03-08 DIAGNOSIS — N40.1 BENIGN PROSTATIC HYPERPLASIA WITH NOCTURIA: ICD-10-CM

## 2019-03-08 DIAGNOSIS — L71.9 ROSACEA: ICD-10-CM

## 2019-03-08 DIAGNOSIS — E11.8 TYPE 2 DIABETES MELLITUS WITH COMPLICATION, WITHOUT LONG-TERM CURRENT USE OF INSULIN (HCC): Primary | ICD-10-CM

## 2019-03-08 DIAGNOSIS — Z23 NEED FOR PROPHYLACTIC VACCINATION AGAINST STREPTOCOCCUS PNEUMONIAE (PNEUMOCOCCUS): ICD-10-CM

## 2019-03-08 DIAGNOSIS — I10 ESSENTIAL HYPERTENSION: ICD-10-CM

## 2019-03-08 PROBLEM — Z48.02 VISIT FOR SUTURE REMOVAL: Status: RESOLVED | Noted: 2018-10-09 | Resolved: 2019-03-08

## 2019-03-08 PROBLEM — Z51.89 VISIT FOR WOUND CHECK: Status: RESOLVED | Noted: 2018-10-23 | Resolved: 2019-03-08

## 2019-03-08 LAB — HBA1C MFR BLD: 6.8 %

## 2019-03-08 PROCEDURE — 1101F PT FALLS ASSESS-DOCD LE1/YR: CPT | Performed by: FAMILY MEDICINE

## 2019-03-08 PROCEDURE — G8419 CALC BMI OUT NRM PARAM NOF/U: HCPCS | Performed by: FAMILY MEDICINE

## 2019-03-08 PROCEDURE — 83036 HEMOGLOBIN GLYCOSYLATED A1C: CPT | Performed by: FAMILY MEDICINE

## 2019-03-08 PROCEDURE — 90732 PPSV23 VACC 2 YRS+ SUBQ/IM: CPT | Performed by: FAMILY MEDICINE

## 2019-03-08 PROCEDURE — 3044F HG A1C LEVEL LT 7.0%: CPT | Performed by: FAMILY MEDICINE

## 2019-03-08 PROCEDURE — G8484 FLU IMMUNIZE NO ADMIN: HCPCS | Performed by: FAMILY MEDICINE

## 2019-03-08 PROCEDURE — 99214 OFFICE O/P EST MOD 30 MIN: CPT | Performed by: FAMILY MEDICINE

## 2019-03-08 PROCEDURE — G8427 DOCREV CUR MEDS BY ELIG CLIN: HCPCS | Performed by: FAMILY MEDICINE

## 2019-03-08 PROCEDURE — 4040F PNEUMOC VAC/ADMIN/RCVD: CPT | Performed by: FAMILY MEDICINE

## 2019-03-08 PROCEDURE — 1036F TOBACCO NON-USER: CPT | Performed by: FAMILY MEDICINE

## 2019-03-08 PROCEDURE — 3017F COLORECTAL CA SCREEN DOC REV: CPT | Performed by: FAMILY MEDICINE

## 2019-03-08 PROCEDURE — 2022F DILAT RTA XM EVC RTNOPTHY: CPT | Performed by: FAMILY MEDICINE

## 2019-03-08 PROCEDURE — 1123F ACP DISCUSS/DSCN MKR DOCD: CPT | Performed by: FAMILY MEDICINE

## 2019-03-08 PROCEDURE — G0009 ADMIN PNEUMOCOCCAL VACCINE: HCPCS | Performed by: FAMILY MEDICINE

## 2019-03-08 RX ORDER — CIPROFLOXACIN 500 MG/1
500 TABLET, FILM COATED ORAL 2 TIMES DAILY
Qty: 20 TABLET | Refills: 0 | Status: SHIPPED | OUTPATIENT
Start: 2019-03-08 | End: 2019-09-10 | Stop reason: SDUPTHER

## 2019-03-08 RX ORDER — TADALAFIL 5 MG/1
5 TABLET ORAL DAILY
Qty: 30 TABLET | Refills: 3 | Status: SHIPPED | OUTPATIENT
Start: 2019-03-08 | End: 2019-09-06

## 2019-03-08 RX ORDER — MINOCYCLINE HYDROCHLORIDE 100 MG/1
100 CAPSULE ORAL 2 TIMES DAILY
Qty: 30 CAPSULE | Refills: 5 | Status: SHIPPED | OUTPATIENT
Start: 2019-03-08 | End: 2019-12-10

## 2019-03-08 RX ORDER — CHLORTHALIDONE 25 MG/1
25 TABLET ORAL DAILY
Qty: 90 TABLET | Refills: 1 | Status: SHIPPED | OUTPATIENT
Start: 2019-03-08 | End: 2021-01-18

## 2019-03-08 ASSESSMENT — ENCOUNTER SYMPTOMS
COUGH: 0
SHORTNESS OF BREATH: 0

## 2019-03-08 ASSESSMENT — PATIENT HEALTH QUESTIONNAIRE - PHQ9
2. FEELING DOWN, DEPRESSED OR HOPELESS: 0
SUM OF ALL RESPONSES TO PHQ9 QUESTIONS 1 & 2: 0
SUM OF ALL RESPONSES TO PHQ QUESTIONS 1-9: 0
SUM OF ALL RESPONSES TO PHQ QUESTIONS 1-9: 0
1. LITTLE INTEREST OR PLEASURE IN DOING THINGS: 0

## 2019-03-12 ENCOUNTER — OFFICE VISIT (OUTPATIENT)
Dept: SURGERY | Age: 69
End: 2019-03-12

## 2019-03-12 DIAGNOSIS — L90.5 SCAR CONDITION AND FIBROSIS OF SKIN: Primary | ICD-10-CM

## 2019-03-12 PROCEDURE — 99024 POSTOP FOLLOW-UP VISIT: CPT | Performed by: DERMATOLOGY

## 2019-04-11 RX ORDER — ATORVASTATIN CALCIUM 40 MG/1
TABLET, FILM COATED ORAL
Qty: 90 TABLET | Refills: 1 | Status: SHIPPED | OUTPATIENT
Start: 2019-04-11 | End: 2019-12-12 | Stop reason: SDUPTHER

## 2019-06-11 ENCOUNTER — OFFICE VISIT (OUTPATIENT)
Dept: DERMATOLOGY | Age: 69
End: 2019-06-11
Payer: MEDICARE

## 2019-06-11 DIAGNOSIS — L57.0 AK (ACTINIC KERATOSIS): Primary | ICD-10-CM

## 2019-06-11 DIAGNOSIS — D48.5 NEOPLASM OF UNCERTAIN BEHAVIOR OF SKIN: ICD-10-CM

## 2019-06-11 DIAGNOSIS — L81.4 SOLAR LENTIGO: ICD-10-CM

## 2019-06-11 DIAGNOSIS — Z85.828 HISTORY OF BASAL CELL CARCINOMA (BCC): ICD-10-CM

## 2019-06-11 PROCEDURE — 1036F TOBACCO NON-USER: CPT | Performed by: DERMATOLOGY

## 2019-06-11 PROCEDURE — 99213 OFFICE O/P EST LOW 20 MIN: CPT | Performed by: DERMATOLOGY

## 2019-06-11 PROCEDURE — 1123F ACP DISCUSS/DSCN MKR DOCD: CPT | Performed by: DERMATOLOGY

## 2019-06-11 PROCEDURE — 4040F PNEUMOC VAC/ADMIN/RCVD: CPT | Performed by: DERMATOLOGY

## 2019-06-11 PROCEDURE — G8419 CALC BMI OUT NRM PARAM NOF/U: HCPCS | Performed by: DERMATOLOGY

## 2019-06-11 PROCEDURE — 11102 TANGNTL BX SKIN SINGLE LES: CPT | Performed by: DERMATOLOGY

## 2019-06-11 PROCEDURE — G8427 DOCREV CUR MEDS BY ELIG CLIN: HCPCS | Performed by: DERMATOLOGY

## 2019-06-11 PROCEDURE — 3017F COLORECTAL CA SCREEN DOC REV: CPT | Performed by: DERMATOLOGY

## 2019-06-11 NOTE — PATIENT INSTRUCTIONS

## 2019-06-11 NOTE — PROGRESS NOTES
[facility]  Nicky Rodríguez MD  [address]  1950    76 y.o. male     Date of Visit: 6/11/2019    Chief Complaint: skin lesions    History of Present Illness:    1. Follow-up for history of actinic keratoses- lesions on the face remain stable, small and asymptomatic. 2.  He also has multiple pigmented lesions on the upper portion of the trunk-not aware of any changes in size, color, shape. 3.  Also c/o a persistent pink lesion on the left central cheek. 4.  He has a history of multiple basal cell carcinomas-denies any signs of recurrence. Nodular BCC on the R nasal tip-treated with Mohs by Dr. Phill Ontiveros on 10/2/2018. Nodular BCC of the left nasal ala-treated with Mohs by Dr. Pearl Rowley in April 2014. Superficial BCC of the right shoulder-treated with curettage in April 2014. BCC of the right mid cheek-treated with Mohs by Dr. Romy Rosenbaum Sept 2012. Pigmented nodular BCC the right mandible-treated with Mohs in [facility] [id number]. Superficial BCC of the right cheek-treated with Mohs by Dr. Pearl Rowley in December 2009.     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 Has a home in [place].        Review of Systems:  Skin: No new or changing moles. Past Medical History, Family History, Surgical History, Medications and Allergies reviewed.     Past Medical History:   Diagnosis Date    [facility]     [address] R nasal tip    Fracture cervical vertebra-closed ([facility] [address] 3/16/13     Past Surgical History:   Procedure Laterality Date    HERNIA REPAIR      MOHS SURGERY  10/02/2018    right nasal tip    TONSILLECTOMY         No Known Allergies  Outpatient Medications Marked as Taking for the 6/11/19 encounter (Office Visit) with Silvia Vance MD   Medication Sig Dispense Refill    atorvastatin (LIPITOR) 40 MG tablet TAKE 1 TABLET BY MOUTH ONCE DAILY 90 tablet 1    finasteride (PROSCAR) 5 MG tablet TAKE 1 TABLET BY MOUTH ONCE DAILY 90 tablet 3    POTASSIUM CITRATE PO Take potential.    Continue sun protective behaviors. 2. Solar lentigines    Continue sun protection. 3. Neoplasm of uncertain behavior of skin, left central cheek-rule [address]    Discussed possible diagnosis; patient agreeable to biopsy (verbal consent obtained). The area(s) to be biopsied were marked with a surgical pen. Alcohol was used to cleanse the site. Local anesthesia was acheived with 1% lidocaine with epinephrine. Shave biopsy was performed using a razor blade. Hemostasis was achieved with aluminum chloride. The wound(s) were dressed with petrolatum and covered with a bandage. Wound care instructions were reviewed. 1 Specimen (s) sent to pathology. The specimen bottles were appropriately labeled. We also reviewed the risks of bleeding, scar, and infection. 4. History of basal cell carcinoma (BCC) - clear    Sun protective behaviors and self skin examinations were encouraged. Call for any new or concerning lesions. Return in about 6 months (around 12/11/2019).

## 2019-06-13 LAB — DERMATOLOGY PATHOLOGY REPORT: ABNORMAL

## 2019-06-14 RX ORDER — LOSARTAN POTASSIUM 25 MG/1
TABLET ORAL
Qty: 90 TABLET | Refills: 1 | Status: SHIPPED | OUTPATIENT
Start: 2019-06-14 | End: 2019-12-23

## 2019-06-18 DIAGNOSIS — C44.319 BASAL CELL CARCINOMA (BCC) OF LEFT CHEEK: Primary | ICD-10-CM

## 2019-08-06 ENCOUNTER — PROCEDURE VISIT (OUTPATIENT)
Dept: SURGERY | Age: 69
End: 2019-08-06
Payer: MEDICARE

## 2019-08-06 VITALS
DIASTOLIC BLOOD PRESSURE: 77 MMHG | OXYGEN SATURATION: 98 % | TEMPERATURE: 97.6 F | WEIGHT: 205 LBS | HEART RATE: 57 BPM | SYSTOLIC BLOOD PRESSURE: 153 MMHG | BODY MASS INDEX: 29.41 KG/M2

## 2019-08-06 DIAGNOSIS — C44.319 BASAL CELL CARCINOMA OF LEFT CHEEK: Primary | ICD-10-CM

## 2019-08-06 PROCEDURE — 12052 INTMD RPR FACE/MM 2.6-5.0 CM: CPT | Performed by: DERMATOLOGY

## 2019-08-06 PROCEDURE — 17311 MOHS 1 STAGE H/N/HF/G: CPT | Performed by: DERMATOLOGY

## 2019-08-06 PROCEDURE — 17312 MOHS ADDL STAGE: CPT | Performed by: DERMATOLOGY

## 2019-08-06 RX ORDER — CEPHALEXIN 500 MG/1
500 CAPSULE ORAL 3 TIMES DAILY
Qty: 15 CAPSULE | Refills: 0 | Status: SHIPPED | OUTPATIENT
Start: 2019-08-06 | End: 2019-12-10

## 2019-08-06 NOTE — PROGRESS NOTES
MOHS PROCEDURE NOTE    PHYSICIAN:  Gauri Armendariz MD    ASSISTANT: Sonia Carter RN     REFERRING PROVIDER:   Lianet Paula MD    PREOPERATIVE DIAGNOSIS: Nodular Basal Cell Carcinoma     SPECIFIC MOHS INDICATIONS:  location    AUC SCORIN/9    POSTOPERATIVE DIAGNOSIS: SAME    LOCATION: Left central cheek    OPERATIVE PROCEDURE:  MOHS MICROGRAPHIC SURGERY    RECONSTRUCTION OF DEFECT: Intermediate layered closure    PREOPERATIVE SIZE: 10x7 MM    DEFECT SIZE: 20x15 MM    LENGTH OF REPAIRED WOUND/SIZE OF FLAP/SIZE OF GRAFT:  42 MM    ANESTHESIA:  10mL 1% lidocaine with epinephrine 1:100,000 buffered. EBL:  MINIMAL    DURATION OF PROCEDURE:  2 HOURS    POSTOPERATIVE OBSERVATION: 1 HOUR    SPECIMENS:  SEE MOHS MAP    COMPLICATIONS:  NONE    DESCRIPTION OF PROCEDURE:  The patient was given a mirror, as appropriate, and the biopsy site was identified, marked with a surgical marking pen, and verified by the patient. Options for treatment were discussed and the patient was informed that Mohs surgery was the selected treatment based on its lower recurrence rate, given the features listed above, as compared to other treatment modalities such as excision, radiation, or curettage, and agreed with this treatment plan. Risks and benefits including bruising, swelling, bleeding, infection, nerve injury, recurrence, and scarring were discussed with the patient prior to the procedure and a written consent detailing these and other risks was reviewed with the patient and signed. There was a time out for person and procedure verification. The surgical site was prepped with an antiseptic solution. Application of an antiseptic solution was repeated before each surgical stage. Stage I:  The clinically-apparent tumor was carefully defined and debulked, determining the edge of the surgical excision.     A thin layer of tumor-laden tissue was excised with a narrow margin of normal-appearing skin, using the technique

## 2019-08-07 ENCOUNTER — TELEPHONE (OUTPATIENT)
Dept: SURGERY | Age: 69
End: 2019-08-07

## 2019-08-12 RX ORDER — SITAGLIPTIN AND METFORMIN HYDROCHLORIDE 1000; 50 MG/1; MG/1
TABLET, FILM COATED ORAL
Qty: 60 TABLET | Refills: 5 | Status: SHIPPED | OUTPATIENT
Start: 2019-08-12 | End: 2020-02-10

## 2019-09-06 ENCOUNTER — OFFICE VISIT (OUTPATIENT)
Dept: FAMILY MEDICINE CLINIC | Age: 69
End: 2019-09-06
Payer: MEDICARE

## 2019-09-06 VITALS
DIASTOLIC BLOOD PRESSURE: 78 MMHG | OXYGEN SATURATION: 98 % | TEMPERATURE: 98.8 F | BODY MASS INDEX: 28.69 KG/M2 | HEIGHT: 70 IN | WEIGHT: 200.4 LBS | HEART RATE: 72 BPM | SYSTOLIC BLOOD PRESSURE: 116 MMHG | RESPIRATION RATE: 14 BRPM

## 2019-09-06 DIAGNOSIS — I10 ESSENTIAL HYPERTENSION: ICD-10-CM

## 2019-09-06 DIAGNOSIS — E11.8 TYPE 2 DIABETES MELLITUS WITH COMPLICATION, WITHOUT LONG-TERM CURRENT USE OF INSULIN (HCC): ICD-10-CM

## 2019-09-06 DIAGNOSIS — N52.8 OTHER MALE ERECTILE DYSFUNCTION: ICD-10-CM

## 2019-09-06 DIAGNOSIS — E11.8 TYPE 2 DIABETES MELLITUS WITH COMPLICATION, WITHOUT LONG-TERM CURRENT USE OF INSULIN (HCC): Primary | ICD-10-CM

## 2019-09-06 LAB
A/G RATIO: 1.8 (ref 1.1–2.2)
ALBUMIN SERPL-MCNC: 4.9 G/DL (ref 3.4–5)
ALP BLD-CCNC: 52 U/L (ref 40–129)
ALT SERPL-CCNC: 26 U/L (ref 10–40)
ANION GAP SERPL CALCULATED.3IONS-SCNC: 17 MMOL/L (ref 3–16)
AST SERPL-CCNC: 25 U/L (ref 15–37)
BASOPHILS ABSOLUTE: 0.1 K/UL (ref 0–0.2)
BASOPHILS RELATIVE PERCENT: 0.5 %
BILIRUB SERPL-MCNC: 0.5 MG/DL (ref 0–1)
BUN BLDV-MCNC: 29 MG/DL (ref 7–20)
CALCIUM SERPL-MCNC: 10.1 MG/DL (ref 8.3–10.6)
CHLORIDE BLD-SCNC: 104 MMOL/L (ref 99–110)
CHOLESTEROL, TOTAL: 138 MG/DL (ref 0–199)
CO2: 20 MMOL/L (ref 21–32)
CREAT SERPL-MCNC: 1 MG/DL (ref 0.8–1.3)
CREATININE URINE: 67.3 MG/DL (ref 39–259)
EOSINOPHILS ABSOLUTE: 0.4 K/UL (ref 0–0.6)
EOSINOPHILS RELATIVE PERCENT: 3.7 %
GFR AFRICAN AMERICAN: >60
GFR NON-AFRICAN AMERICAN: >60
GLOBULIN: 2.7 G/DL
GLUCOSE BLD-MCNC: 148 MG/DL (ref 70–99)
HCT VFR BLD CALC: 46 % (ref 40.5–52.5)
HDLC SERPL-MCNC: 49 MG/DL (ref 40–60)
HEMOGLOBIN: 15.6 G/DL (ref 13.5–17.5)
LDL CHOLESTEROL CALCULATED: 37 MG/DL
LYMPHOCYTES ABSOLUTE: 1.2 K/UL (ref 1–5.1)
LYMPHOCYTES RELATIVE PERCENT: 11.1 %
MCH RBC QN AUTO: 31.9 PG (ref 26–34)
MCHC RBC AUTO-ENTMCNC: 34 G/DL (ref 31–36)
MCV RBC AUTO: 93.9 FL (ref 80–100)
MICROALBUMIN UR-MCNC: 3.7 MG/DL
MICROALBUMIN/CREAT UR-RTO: 55 MG/G (ref 0–30)
MONOCYTES ABSOLUTE: 1.2 K/UL (ref 0–1.3)
MONOCYTES RELATIVE PERCENT: 11.5 %
NEUTROPHILS ABSOLUTE: 7.7 K/UL (ref 1.7–7.7)
NEUTROPHILS RELATIVE PERCENT: 73.2 %
PDW BLD-RTO: 14.2 % (ref 12.4–15.4)
PLATELET # BLD: 149 K/UL (ref 135–450)
PMV BLD AUTO: 8.5 FL (ref 5–10.5)
POTASSIUM SERPL-SCNC: 4.4 MMOL/L (ref 3.5–5.1)
RBC # BLD: 4.9 M/UL (ref 4.2–5.9)
SODIUM BLD-SCNC: 141 MMOL/L (ref 136–145)
TOTAL PROTEIN: 7.6 G/DL (ref 6.4–8.2)
TRIGL SERPL-MCNC: 261 MG/DL (ref 0–150)
VLDLC SERPL CALC-MCNC: 52 MG/DL
WBC # BLD: 10.5 K/UL (ref 4–11)

## 2019-09-06 PROCEDURE — 99214 OFFICE O/P EST MOD 30 MIN: CPT | Performed by: FAMILY MEDICINE

## 2019-09-06 PROCEDURE — 3017F COLORECTAL CA SCREEN DOC REV: CPT | Performed by: FAMILY MEDICINE

## 2019-09-06 PROCEDURE — 83036 HEMOGLOBIN GLYCOSYLATED A1C: CPT | Performed by: FAMILY MEDICINE

## 2019-09-06 PROCEDURE — 2022F DILAT RTA XM EVC RTNOPTHY: CPT | Performed by: FAMILY MEDICINE

## 2019-09-06 PROCEDURE — 3044F HG A1C LEVEL LT 7.0%: CPT | Performed by: FAMILY MEDICINE

## 2019-09-06 PROCEDURE — G8427 DOCREV CUR MEDS BY ELIG CLIN: HCPCS | Performed by: FAMILY MEDICINE

## 2019-09-06 PROCEDURE — 4040F PNEUMOC VAC/ADMIN/RCVD: CPT | Performed by: FAMILY MEDICINE

## 2019-09-06 PROCEDURE — 1123F ACP DISCUSS/DSCN MKR DOCD: CPT | Performed by: FAMILY MEDICINE

## 2019-09-06 PROCEDURE — G8419 CALC BMI OUT NRM PARAM NOF/U: HCPCS | Performed by: FAMILY MEDICINE

## 2019-09-06 PROCEDURE — 1036F TOBACCO NON-USER: CPT | Performed by: FAMILY MEDICINE

## 2019-09-06 RX ORDER — SILDENAFIL CITRATE 20 MG/1
TABLET ORAL
Qty: 30 TABLET | Refills: 2 | Status: SHIPPED | OUTPATIENT
Start: 2019-09-06 | End: 2020-12-17

## 2019-09-06 ASSESSMENT — ENCOUNTER SYMPTOMS
SHORTNESS OF BREATH: 0
COUGH: 0

## 2019-09-09 LAB — HBA1C MFR BLD: 6.6 %

## 2019-09-10 DIAGNOSIS — E11.8 TYPE 2 DIABETES MELLITUS WITH COMPLICATION, WITHOUT LONG-TERM CURRENT USE OF INSULIN (HCC): ICD-10-CM

## 2019-09-10 LAB
SEX HORMONE BINDING GLOBULIN: 38 NMOL/L (ref 11–80)
TESTOSTERONE FREE-NONMALE: 48.6 PG/ML (ref 47–244)
TESTOSTERONE TOTAL: 272 NG/DL (ref 220–1000)

## 2019-09-10 RX ORDER — CIPROFLOXACIN 500 MG/1
TABLET, FILM COATED ORAL
Qty: 20 TABLET | Refills: 0 | Status: SHIPPED | OUTPATIENT
Start: 2019-09-10 | End: 2020-03-25 | Stop reason: SDUPTHER

## 2019-09-10 RX ORDER — EMPAGLIFLOZIN 25 MG/1
TABLET, FILM COATED ORAL
Qty: 30 TABLET | Refills: 11 | Status: SHIPPED | OUTPATIENT
Start: 2019-09-10 | End: 2020-09-15

## 2019-09-30 ENCOUNTER — TELEPHONE (OUTPATIENT)
Dept: FAMILY MEDICINE CLINIC | Age: 69
End: 2019-09-30

## 2019-10-01 RX ORDER — SILODOSIN 4 MG/1
4 CAPSULE ORAL EVERY EVENING
Qty: 30 CAPSULE | Refills: 5 | Status: SHIPPED | OUTPATIENT
Start: 2019-10-01 | End: 2021-01-18

## 2019-10-03 ENCOUNTER — TELEPHONE (OUTPATIENT)
Dept: FAMILY MEDICINE CLINIC | Age: 69
End: 2019-10-03

## 2019-12-10 ENCOUNTER — OFFICE VISIT (OUTPATIENT)
Dept: DERMATOLOGY | Age: 69
End: 2019-12-10
Payer: MEDICARE

## 2019-12-10 DIAGNOSIS — L57.0 AK (ACTINIC KERATOSIS): ICD-10-CM

## 2019-12-10 DIAGNOSIS — D48.5 NEOPLASM OF UNCERTAIN BEHAVIOR OF SKIN: Primary | ICD-10-CM

## 2019-12-10 PROCEDURE — 17000 DESTRUCT PREMALG LESION: CPT | Performed by: DERMATOLOGY

## 2019-12-10 PROCEDURE — 11102 TANGNTL BX SKIN SINGLE LES: CPT | Performed by: DERMATOLOGY

## 2019-12-10 PROCEDURE — 17003 DESTRUCT PREMALG LES 2-14: CPT | Performed by: DERMATOLOGY

## 2019-12-12 DIAGNOSIS — C44.219 BASAL CELL CARCINOMA (BCC) OF LEFT POSTAURICULAR REGION: Primary | ICD-10-CM

## 2019-12-12 LAB — DERMATOLOGY PATHOLOGY REPORT: ABNORMAL

## 2019-12-23 RX ORDER — FINASTERIDE 5 MG/1
TABLET, FILM COATED ORAL
Qty: 90 TABLET | Refills: 0 | OUTPATIENT
Start: 2019-12-23

## 2019-12-23 RX ORDER — MINOCYCLINE HYDROCHLORIDE 100 MG/1
CAPSULE ORAL
Qty: 30 CAPSULE | Refills: 0 | OUTPATIENT
Start: 2019-12-23

## 2019-12-23 RX ORDER — GLIPIZIDE 10 MG/1
TABLET ORAL
Qty: 180 TABLET | Refills: 0 | Status: SHIPPED | OUTPATIENT
Start: 2019-12-23 | End: 2020-05-21

## 2019-12-23 RX ORDER — LOSARTAN POTASSIUM 25 MG/1
TABLET ORAL
Qty: 90 TABLET | Refills: 0 | Status: SHIPPED | OUTPATIENT
Start: 2019-12-23 | End: 2020-04-30

## 2019-12-24 RX ORDER — FINASTERIDE 5 MG/1
TABLET, FILM COATED ORAL
Qty: 90 TABLET | Refills: 1 | Status: SHIPPED | OUTPATIENT
Start: 2019-12-24 | End: 2021-01-18

## 2020-01-20 RX ORDER — GLIPIZIDE 10 MG/1
TABLET ORAL
Qty: 180 TABLET | Refills: 0 | OUTPATIENT
Start: 2020-01-20

## 2020-01-21 ENCOUNTER — PROCEDURE VISIT (OUTPATIENT)
Dept: SURGERY | Age: 70
End: 2020-01-21
Payer: MEDICARE

## 2020-01-21 VITALS
SYSTOLIC BLOOD PRESSURE: 132 MMHG | TEMPERATURE: 98.2 F | HEART RATE: 77 BPM | DIASTOLIC BLOOD PRESSURE: 81 MMHG | BODY MASS INDEX: 28.67 KG/M2 | OXYGEN SATURATION: 98 % | WEIGHT: 199.8 LBS

## 2020-01-21 PROCEDURE — 17311 MOHS 1 STAGE H/N/HF/G: CPT | Performed by: DERMATOLOGY

## 2020-01-21 PROCEDURE — 17312 MOHS ADDL STAGE: CPT | Performed by: DERMATOLOGY

## 2020-01-21 PROCEDURE — 12052 INTMD RPR FACE/MM 2.6-5.0 CM: CPT | Performed by: DERMATOLOGY

## 2020-01-21 NOTE — PATIENT INSTRUCTIONS
[facility] Office Hours:    Monday-Thursday  7:30 AM-4:30 PM    Friday  9:00 AM-3:00 PM    POST-OPERATIVE CARE FOR LIQUID SKIN ADHESIVE             Bandage change after 24 hours    During your procedure today, a liquid skin adhesive was used to close the wound. You do not have to have stiches removed. If has a clear to light purple shiny surface. You do not have to have this removed. It will dissolve (melt away) in about 1-2 weeks. Please follow these instructions to help you recover from your procedure and help you wound heal.    CARING FOR YOUR SURGICAL SITE  The bandage should remain on and completley dry for 24 hours. Do NOT get the bandage wet. 1. After the first 24 hours, gently remove the remaining part of the bandage. It can be helpful to moisten the bandage edges in the shower. 2. Be gentle around the area of the wound. Do not scrub, rub or pick at the skin glue. It will gradually dissolve in 1-2 weeks. 3. Do not shave directly over the wound for one week. You can shave around the area. 4. After one week you can start cleaning the area gently and resume all normal activity. No further restrictions. 5. Use Sunscreen with SPF of at least 30 on the area around the wound. If the dressing comes off or if you have questions, or concerns about the dressing, please call the office for instructions! POST OPERATIVE INSTRUCTIONS    1. Activity: Do not lift anything heavier than a gallon of milk for 1 week. Also, avoid strenuous activity such as running, power walking or contact sports. 2. Eating and drinking: Do not drink alcohol for 48 hours after your procedure. Alcohol increases the chances of bleeding. 3. Medicines   -If you have discomfort, take Acetaminophen (Tylenol or Extra Strength Tylenol). Follow the instructions and warning on the bottle. -If your doctor has prescribed you an Aspirin daily, please keep taking it.  Do not take extra Aspirin or medicines containing Aspirin (such as Yasmine-Hawthorne and Excedrin) for 48 hours  after your procedure. Bleeding: If bleeding occurs, DO NOT remove the bandage. Put firm pressure on the area with gauze for 20 minutes without peeking. If the bleeding continue, apply pressure for another 20 minutes. If the bleeding does not stop after you apply pressure, call us right away. If you can not call, go to the nearest emergency room or urgent care facility. What to expect:  You may have these symptoms. They are normal and should get better with time:  1. Swelling. Swelling usually increases for the first 48 hours after your procedure and then begins to improve. Some soreness and redness around your wound. If we worked close to you eyes  (forehead, nose, temple, or upper cheeks) your eyes may become swollen and/ or black and blue. 2. Bruising, which could last 1 week or more. 3. Pink and bumpy appearance to the scar. This may happen a few weeks after your procedure. After 4 weeks, you may gently massage the area each day with facial moisturizer or petroleum jelly (Vaseline or Aquaphor). This will help to smooth the skin and improve the appearance of the scar. The color of your scar will fade over time, but may be pink for several months after the procedure. The scar may take 6 months to 1 year to reach its final color and appearance. 4. \"Spitting\" suture. Occasionally, an inside suture (stitch) does not completely dissolve. When this happens, (generally 4-8 weeks after surgery), it causes a bump or \"pimple\" to form on the scar. This is easily removed and is not at all serious. It does not mean the skin cancer has returned. Contact us if it happens, but do not be alarmed. Vitamin E oil is NOT necessary. A good moisturizer is just as effective. Sunscreen IS necessary.  Use at least and SPF 30 sunscreen daily- even in winter    Call us at [phone] right away if you have any of the following symptoms:  -Bleeding that you can not stop (see highlighted area above)   -Pain that lasts longer than 48 hours  -Your wound becomes  more painful, red or hot  -Bruising and swelling that does not begin to improve within the 48 hours or gets worse suddenly.

## 2020-01-21 NOTE — PROGRESS NOTES
PRE-PROCEDURE SCREENING    Pacemaker/ICD: No  Difficulty with numbing in the past: No  Local Anesthesia Reaction/passing out: No  Latex or adhesive allergy:  No  Bleeding/Clotting Disorders: No  Anticoagulant Therapy: No  Joint prosthesis: No  Artificial Heart Valve: No  Stroke or Seizures: No  Organ Transplant or Lymphoma: No  Immunosuppression: No  Respiratory Problems: No
Mohs.  A map was prepared to correspond to the area of skin from which it was excised. Hemostasis was achieved using electrosurgery. The wound was bandaged. The tissue was prepared for the cryostat and sectioned. 1 section(s) prepared. Each section was coded, cut, and stained for microscopic examination. The entire base and margins of the excised piece of tissue were examined by the surgeon. Stage I:  Nodular BCC: large basaloid lobules of varying shape and size with peripheral palisading present around the rim of the lobule, with retraction of the tumor lobules from their associated stroma. The remaining tumor was noted and the next stage was performed. Stage II:  A thin layer of tissue was removed at the histologically-identified sites of remaining tumor. The entire procedure as described in stage I was repeated to process the tissue according to Mohs technique. 1 section(s) prepared for stage II. No tumor was identified at the peripheral margins of stage II of microscopically controlled surgery. DEFECT MANAGEMENT:    REPAIR DESCRIPTION:  Various closure modalities were discussed with the patient, and it was decided that an intermediate layered repair would best preserve normal anatomic and functional relationships. Additional risk of wound dehiscence was discussed. The area was anesthetized with 1% lidocaine with epinephrine 1:100,000 buffered, was given a sterile prep using Chlorhexidine gluconate 4% solution and draped in the usual sterile fashion. Recreation and enlargement of the wound was performed by excising cones of tissue via the triangulation technique. The final incision lines were placed with respect for the patient's natural skin tension lines in a linear configuration to avoid functional and aesthetic distortion of adjacent free margins. Following minimal undermining, meticulous hemostasis was obtained with spot monopolar electrocoagulation.   Subcutaneous

## 2020-01-22 ENCOUNTER — TELEPHONE (OUTPATIENT)
Dept: SURGERY | Age: 70
End: 2020-01-22

## 2020-03-24 ENCOUNTER — TELEPHONE (OUTPATIENT)
Dept: FAMILY MEDICINE CLINIC | Age: 70
End: 2020-03-24

## 2020-03-24 NOTE — TELEPHONE ENCOUNTER
Pt has a DM appt on Friday wants to know if that's ok to still come in?   Also does pt need to fast? Do not see where there are any labs

## 2020-03-24 NOTE — TELEPHONE ENCOUNTER
Called and Located within [facility] for pt to call the office back. I need to know if pt has an APPLE phone, Wi-fi, and can download the [employer] noah. If so and Dr. Erika Brice is okay with this since he is over 72 and we are recommending pt's over this age not to come in during this [address] he could do the Virtual Visit Phone Call instead and we could have our front staff just change the visit type for his upcoming appt on Friday. Dr. Erika Brice what are your thoughts? Please Advise would you like pt to do if possible?      Thank you

## 2020-03-25 RX ORDER — CIPROFLOXACIN 500 MG/1
TABLET, FILM COATED ORAL
Qty: 20 TABLET | Refills: 0 | Status: SHIPPED | OUTPATIENT
Start: 2020-03-25 | End: 2020-05-22 | Stop reason: SDUPTHER

## 2020-03-25 NOTE — TELEPHONE ENCOUNTER
Pt does not want an e visit or a video visit. He states that he will be fine until may when he rescheduled.     SHIMON    thanks

## 2020-04-29 ENCOUNTER — VIRTUAL VISIT (OUTPATIENT)
Dept: FAMILY MEDICINE CLINIC | Age: 70
End: 2020-04-29
Payer: MEDICARE

## 2020-04-29 VITALS
SYSTOLIC BLOOD PRESSURE: 130 MMHG | DIASTOLIC BLOOD PRESSURE: 86 MMHG | BODY MASS INDEX: 28.7 KG/M2 | HEART RATE: 62 BPM | WEIGHT: 200 LBS

## 2020-04-29 PROBLEM — M50.30 DDD (DEGENERATIVE DISC DISEASE), CERVICAL: Status: ACTIVE | Noted: 2020-04-29

## 2020-04-29 PROCEDURE — 4040F PNEUMOC VAC/ADMIN/RCVD: CPT | Performed by: FAMILY MEDICINE

## 2020-04-29 PROCEDURE — 3017F COLORECTAL CA SCREEN DOC REV: CPT | Performed by: FAMILY MEDICINE

## 2020-04-29 PROCEDURE — 1123F ACP DISCUSS/DSCN MKR DOCD: CPT | Performed by: FAMILY MEDICINE

## 2020-04-29 PROCEDURE — 99214 OFFICE O/P EST MOD 30 MIN: CPT | Performed by: FAMILY MEDICINE

## 2020-04-29 PROCEDURE — G8427 DOCREV CUR MEDS BY ELIG CLIN: HCPCS | Performed by: FAMILY MEDICINE

## 2020-04-29 ASSESSMENT — PATIENT HEALTH QUESTIONNAIRE - PHQ9
SUM OF ALL RESPONSES TO PHQ QUESTIONS 1-9: 0
SUM OF ALL RESPONSES TO PHQ9 QUESTIONS 1 & 2: 0
1. LITTLE INTEREST OR PLEASURE IN DOING THINGS: 0
SUM OF ALL RESPONSES TO PHQ QUESTIONS 1-9: 0
2. FEELING DOWN, DEPRESSED OR HOPELESS: 0

## 2020-04-29 NOTE — PROGRESS NOTES
4/29/2020    This is a 71 y.o. male   Chief Complaint   Patient presents with    Other     fell last Friday. over the last 5 days chest pain and back pain, dizziness while walking. resting pules 128     HPI  Patient reports episodes of some dizziness, headache, and concern for elevated heart rate over the past 5 days  - walking up hill he would have a heavy feeling in his legs which has now resolved  - his BP has remained normal which he checks at home  - he had chest pain and midback pain earlier this week that resolved with cold packs  - during this episode he checked his pulse, and it would range in the 90s-120s along with palpitations. This lasted a couple of hours  - today his HR has been in the 60s which is more c/w his baseline    His chest pain lasted 2-3 hours and was associated with thoracic back pain as well. Felt like a drawing in of his chest.     Chronically, he has had some issues for years with dizziness and neck pain since his cervical neck fracture. He goes to his chiropractor regularly which helps keep these episodes at [address]   As per HPI, otherwise negative    Past Medical History:   Diagnosis Date    Cancer ([facility] [address]     [facility] R nasal tip    Fracture cervical vertebra-closed ([facility] [address] 3/16/13       Past Surgical History:   Procedure Laterality Date    HERNIA REPAIR      MOHS SURGERY  10/02/2018    right nasal tip    TONSILLECTOMY         No family history on file.     Current Outpatient Medications   Medication Sig Dispense Refill    ciprofloxacin (CIPRO) 500 MG tablet TAKE 1 TABLET BY MOUTH TWICE DAILY FOR 10 DAYS 20 tablet 0    JANUMET  MG per tablet TAKE 1 TABLET BY MOUTH TWICE DAILY WITH MEALS 180 tablet 1    finasteride (PROSCAR) 5 MG tablet TAKE 1 TABLET BY MOUTH ONCE DAILY 90 tablet 1    losartan (COZAAR) 25 MG tablet TAKE 1 TABLET BY MOUTH ONCE DAILY 90 tablet 0    glipiZIDE (GLUCOTROL) 10 MG tablet TAKE 1 TABLET BY MOUTH TWICE DAILY BEFORE MEAL(S) 180 tablet 0

## 2020-04-30 RX ORDER — LOSARTAN POTASSIUM 25 MG/1
TABLET ORAL
Qty: 90 TABLET | Refills: 0 | Status: SHIPPED | OUTPATIENT
Start: 2020-04-30 | End: 2020-08-03

## 2020-05-21 RX ORDER — GLIPIZIDE 10 MG/1
TABLET ORAL
Qty: 180 TABLET | Refills: 0 | Status: SHIPPED | OUTPATIENT
Start: 2020-05-21 | End: 2020-08-20

## 2020-05-22 ENCOUNTER — VIRTUAL VISIT (OUTPATIENT)
Dept: FAMILY MEDICINE CLINIC | Age: 70
End: 2020-05-22
Payer: MEDICARE

## 2020-05-22 VITALS
OXYGEN SATURATION: 97 % | WEIGHT: 200 LBS | SYSTOLIC BLOOD PRESSURE: 128 MMHG | BODY MASS INDEX: 28.63 KG/M2 | DIASTOLIC BLOOD PRESSURE: 84 MMHG | HEIGHT: 70 IN | HEART RATE: 64 BPM

## 2020-05-22 PROCEDURE — G8427 DOCREV CUR MEDS BY ELIG CLIN: HCPCS | Performed by: FAMILY MEDICINE

## 2020-05-22 PROCEDURE — 99214 OFFICE O/P EST MOD 30 MIN: CPT | Performed by: FAMILY MEDICINE

## 2020-05-22 PROCEDURE — 1123F ACP DISCUSS/DSCN MKR DOCD: CPT | Performed by: FAMILY MEDICINE

## 2020-05-22 PROCEDURE — 3046F HEMOGLOBIN A1C LEVEL >9.0%: CPT | Performed by: FAMILY MEDICINE

## 2020-05-22 PROCEDURE — 3017F COLORECTAL CA SCREEN DOC REV: CPT | Performed by: FAMILY MEDICINE

## 2020-05-22 PROCEDURE — 2022F DILAT RTA XM EVC RTNOPTHY: CPT | Performed by: FAMILY MEDICINE

## 2020-05-22 PROCEDURE — 4040F PNEUMOC VAC/ADMIN/RCVD: CPT | Performed by: FAMILY MEDICINE

## 2020-05-22 RX ORDER — TADALAFIL 5 MG/1
5 TABLET ORAL DAILY PRN
Qty: 90 TABLET | Refills: 1 | Status: SHIPPED | OUTPATIENT
Start: 2020-05-22 | End: 2020-11-13 | Stop reason: SDUPTHER

## 2020-05-22 RX ORDER — CIPROFLOXACIN 500 MG/1
TABLET, FILM COATED ORAL
Qty: 20 TABLET | Refills: 1 | Status: SHIPPED | OUTPATIENT
Start: 2020-05-22 | End: 2020-11-13 | Stop reason: SDUPTHER

## 2020-05-22 NOTE — PROGRESS NOTES
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  last colonsocopy was 5 years ago and they are calling him and asking him to get another one done. he isn't sure if he needs to have it done again? HPI     Diabetes  Lab Results   Component Value Date    LABA1C 6.6 09/09/2019     Lab Results   Component Value Date    .8 11/10/2017   On janumet, Jardiance, glipizide. 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 Admits diet could be a little better - enjoys sweets  - lows are very rare and mild, able to eat something to counteract very easily     ED - sildenafil was not helping significantly. Cialis was too expensive     HTN      BP Readings from Last 3 Encounters:   09/06/19 116/78   08/06/19 (!) 153/77   03/08/19 116/74            Lab Results   Component Value Date      09/07/2018     K 4.3 09/07/2018     CREATININE 1.0 09/07/2018    - currently on losartan, chlorthalidone  - taking medications as prescribed  - no dizziness or light-headedness  - BP at home typically 120s/80s    Feeling great today. Better than he has felt in months or years. - no recurrent episodes of CP, feeling heavy in his legs, or SOB. He has been very active, unloading mulch/manure and had no CP or SOB    BPH  - struggling still with nocturia  - he was on Flomax in the past which was not overly helpful. Rapaflo was also not helpful    Review of Systems  As per HPI, otherwise negative    Prior to Visit Medications    Medication Sig Taking?  Authorizing Provider   ciprofloxacin (CIPRO) 500 MG tablet TAKE 1 TABLET BY MOUTH TWICE DAILY FOR 10 DAYS Yes Adrián Musa MD   tadalafil (CIALIS) 5 MG tablet Take 1 tablet by mouth daily as needed for Erectile Dysfunction Yes Luigi Musa MD   glipiZIDE (GLUCOTROL) 10 MG tablet TAKE 1 TABLET BY MOUTH TWICE DAILY BEFORE MEAL(S) Yes Luigi Musa MD   losartan (COZAAR) 25 MG tablet Take 1 tablet by mouth once daily Yes Pina Aleman MD   JANUMET  MG per tablet TAKE 1 TABLET BY MOUTH TWICE DAILY WITH MEALS Yes Luigi Musa MD   finasteride (PROSCAR) 5 MG tablet TAKE 1 TABLET BY MOUTH ONCE DAILY Yes Luigi Musa MD   atorvastatin (LIPITOR) 40 MG tablet TAKE 1 TABLET BY MOUTH ONCE DAILY Yes Luigi Musa MD   silodosin (RAPAFLO) 4 MG CAPS capsule Take 1 capsule by mouth every evening Yes Pina Aleman MD   JARDIANCE 25 MG tablet TAKE 1 TABLET BY MOUTH ONCE DAILY Yes Luigi Musa MD   sildenafil (REVATIO) 20 MG tablet Take 100mg daily PRN relations Yes Luigi Musa MD   POTASSIUM CITRATE PO Take by mouth Yes Historical Provider, MD   chlorthalidone (HYGROTON) 25 MG tablet Take 1 tablet by mouth daily Yes Luigi Musa MD   VITAMIN A PO Take by mouth Yes Historical Provider, MD   GLUCOSAMINE-CHONDROITIN PO Take by mouth Yes Historical Provider, MD   aspirin EC 81 MG EC tablet Take 81 mg by mouth daily Yes Historical Provider, MD   HYDROQUINONE EX Apply topically Yes Historical Provider, MD   acetaminophen (TYLENOL) 325 MG tablet Take 650 mg by mouth every 6 hours as needed for Pain. Yes Historical Provider, MD   Cholecalciferol (VITAMIN D-3) 5000 UNITS TABS Take 1 tablet by mouth daily. Yes Historical Provider, MD   vitamin E 1000 UNITS capsule Take 1,000 Units by mouth daily. Yes Historical Provider, MD   ibuprofen (ADVIL;MOTRIN) 200 MG tablet Take 600 mg by mouth every 8 hours as needed for Pain or Fever. Yes Historical Provider, MD     Past Medical History:   Diagnosis Date    Cancer ([facility] [address]     [address] R nasal tip    Fracture cervical vertebra-closed ([facility] [address] 3/16/13     No family history on file.     LABS:  Lab Results   Component Value Date     09/06/2019    K 4.4 09/06/2019    CREATININE 1.0 09/06/2019     Cholesterol, Total Date Value Ref Range Status   09/06/2019 138 0 - 199 mg/dL Final     LDL Calculated   Date Value Ref Range Status   09/06/2019 37 <100 mg/dL Final     HDL   Date Value Ref Range Status   09/06/2019 49 40 - 60 mg/dL Final     Triglycerides   Date Value Ref Range Status   09/06/2019 261 (H) 0 - 150 mg/dL Final     Lab Results   Component Value Date    ALT 26 09/06/2019    AST 25 09/06/2019    ALKPHOS 52 09/06/2019    BILITOT 0.5 09/06/2019     Lab Results   Component Value Date    WBC 10.5 09/06/2019    HGB 15.6 09/06/2019    HCT 46.0 09/06/2019    MCV 93.9 09/06/2019     09/06/2019     Lab Results   Component Value Date    LABA1C 6.6 09/09/2019        PHYSICAL EXAM  /84   Pulse 64   Ht 5' 10\" (1.778 m)   Wt 200 lb (90.7 kg)   SpO2 97%   BMI 28.70 kg/m²     BP Readings from Last 5 Encounters:   05/22/20 128/84   04/29/20 130/86   01/21/20 132/81   09/06/19 116/78   08/06/19 (!) 153/77       Wt Readings from Last 5 Encounters:   05/22/20 200 lb (90.7 kg)   04/29/20 200 lb (90.7 kg)   01/21/20 199 lb 12.8 oz (90.6 kg)   09/06/19 200 lb 6.4 oz (90.9 kg)   08/06/19 205 lb (93 kg)        Physical Exam  Constitutional:       Appearance: He is well-developed. HENT:      Head: Normocephalic and atraumatic. Pulmonary:      Effort: Pulmonary effort is normal.   Skin:     Coloration: Skin is not pale. Neurological:      Mental Status: He is alert and oriented to person, place, and time. ASSESSMENT/PLAN:  1. Type 2 diabetes mellitus with complication, without long-term current use of insulin (HCC)  Blood sugars adequately controlled on current regimen  - advise routine diabetes care including annual foot and eye exams, urine micro  - discussed healthy lifestyle and eating habits to improve blood glucose  - Hemoglobin A1C; Future  - Comprehensive Metabolic Panel; Future  - Vitamin B12; Future  - Microalbumin / Creatinine Urine Ratio; Future    2.  Essential hypertension  Well controlled on current start of the visit: yes    Total time spent for this encounter: not billed on time    Services were provided through a video synchronous discussion virtually to substitute for in-person clinic visit. Patient and provider were located at their individual homes. --Mikel Feldman MD on 5/22/2020 at 9:01 AM    An electronic signature was used to authenticate this note.

## 2020-06-02 DIAGNOSIS — N40.1 BPH ASSOCIATED WITH NOCTURIA: ICD-10-CM

## 2020-06-02 DIAGNOSIS — R35.1 BPH ASSOCIATED WITH NOCTURIA: ICD-10-CM

## 2020-06-02 DIAGNOSIS — E11.8 TYPE 2 DIABETES MELLITUS WITH COMPLICATION, WITHOUT LONG-TERM CURRENT USE OF INSULIN (HCC): ICD-10-CM

## 2020-06-02 LAB
A/G RATIO: 1.9 (ref 1.1–2.2)
ALBUMIN SERPL-MCNC: 4.7 G/DL (ref 3.4–5)
ALP BLD-CCNC: 76 U/L (ref 40–129)
ALT SERPL-CCNC: 27 U/L (ref 10–40)
ANION GAP SERPL CALCULATED.3IONS-SCNC: 15 MMOL/L (ref 3–16)
AST SERPL-CCNC: 29 U/L (ref 15–37)
BILIRUB SERPL-MCNC: 0.6 MG/DL (ref 0–1)
BUN BLDV-MCNC: 24 MG/DL (ref 7–20)
CALCIUM SERPL-MCNC: 9.3 MG/DL (ref 8.3–10.6)
CHLORIDE BLD-SCNC: 104 MMOL/L (ref 99–110)
CO2: 20 MMOL/L (ref 21–32)
CREAT SERPL-MCNC: 0.8 MG/DL (ref 0.8–1.3)
CREATININE URINE: 63.8 MG/DL (ref 39–259)
GFR AFRICAN AMERICAN: >60
GFR NON-AFRICAN AMERICAN: >60
GLOBULIN: 2.5 G/DL
GLUCOSE BLD-MCNC: 139 MG/DL (ref 70–99)
MICROALBUMIN UR-MCNC: <1.2 MG/DL
MICROALBUMIN/CREAT UR-RTO: NORMAL MG/G (ref 0–30)
POTASSIUM SERPL-SCNC: 4.3 MMOL/L (ref 3.5–5.1)
PROSTATE SPECIFIC ANTIGEN: 0.79 NG/ML (ref 0–4)
SODIUM BLD-SCNC: 139 MMOL/L (ref 136–145)
TOTAL PROTEIN: 7.2 G/DL (ref 6.4–8.2)
VITAMIN B-12: 361 PG/ML (ref 211–911)

## 2020-06-03 LAB
ESTIMATED AVERAGE GLUCOSE: 142.7 MG/DL
HBA1C MFR BLD: 6.6 %

## 2020-06-15 RX ORDER — ATORVASTATIN CALCIUM 40 MG/1
TABLET, FILM COATED ORAL
Qty: 90 TABLET | Refills: 1 | Status: SHIPPED | OUTPATIENT
Start: 2020-06-15 | End: 2020-12-16

## 2020-08-03 RX ORDER — LOSARTAN POTASSIUM 25 MG/1
TABLET ORAL
Qty: 90 TABLET | Refills: 0 | Status: SHIPPED | OUTPATIENT
Start: 2020-08-03 | End: 2020-11-05

## 2020-08-17 RX ORDER — SITAGLIPTIN AND METFORMIN HYDROCHLORIDE 1000; 50 MG/1; MG/1
TABLET, FILM COATED ORAL
Qty: 180 TABLET | Refills: 0 | Status: SHIPPED | OUTPATIENT
Start: 2020-08-17 | End: 2020-11-18

## 2020-08-19 ENCOUNTER — OFFICE VISIT (OUTPATIENT)
Dept: DERMATOLOGY | Age: 70
End: 2020-08-19
Payer: MEDICARE

## 2020-08-19 VITALS — TEMPERATURE: 97.7 F

## 2020-08-19 PROCEDURE — 17003 DESTRUCT PREMALG LES 2-14: CPT | Performed by: DERMATOLOGY

## 2020-08-19 PROCEDURE — G8417 CALC BMI ABV UP PARAM F/U: HCPCS | Performed by: DERMATOLOGY

## 2020-08-19 PROCEDURE — 17000 DESTRUCT PREMALG LESION: CPT | Performed by: DERMATOLOGY

## 2020-08-19 PROCEDURE — G8427 DOCREV CUR MEDS BY ELIG CLIN: HCPCS | Performed by: DERMATOLOGY

## 2020-08-19 PROCEDURE — 3017F COLORECTAL CA SCREEN DOC REV: CPT | Performed by: DERMATOLOGY

## 2020-08-19 PROCEDURE — 4040F PNEUMOC VAC/ADMIN/RCVD: CPT | Performed by: DERMATOLOGY

## 2020-08-19 PROCEDURE — 99213 OFFICE O/P EST LOW 20 MIN: CPT | Performed by: DERMATOLOGY

## 2020-08-19 PROCEDURE — 1036F TOBACCO NON-USER: CPT | Performed by: DERMATOLOGY

## 2020-08-19 PROCEDURE — 1123F ACP DISCUSS/DSCN MKR DOCD: CPT | Performed by: DERMATOLOGY

## 2020-08-19 NOTE — PROGRESS NOTES
vertex scalp. Patient was educated regarding the potential risks of blister formation, discomfort, hypopigmentation, and scar. Wound care was discussed. 2. Solar lentigines    Monitor for change. Continue sun protection. 3. History of basal cell carcinomas - clear     Sun protective behaviors and self skin examinations were encouraged. Call for any new or concerning lesions. Return in about 6 months (around 2/19/2021).

## 2020-08-20 RX ORDER — GLIPIZIDE 10 MG/1
TABLET ORAL
Qty: 180 TABLET | Refills: 0 | Status: SHIPPED | OUTPATIENT
Start: 2020-08-20 | End: 2020-11-24

## 2020-09-15 RX ORDER — EMPAGLIFLOZIN 25 MG/1
TABLET, FILM COATED ORAL
Qty: 90 TABLET | Refills: 1 | Status: SHIPPED | OUTPATIENT
Start: 2020-09-15 | End: 2021-03-15

## 2020-11-05 RX ORDER — LOSARTAN POTASSIUM 25 MG/1
TABLET ORAL
Qty: 90 TABLET | Refills: 0 | Status: SHIPPED | OUTPATIENT
Start: 2020-11-05 | End: 2021-02-08

## 2020-11-13 ENCOUNTER — OFFICE VISIT (OUTPATIENT)
Dept: FAMILY MEDICINE CLINIC | Age: 70
End: 2020-11-13
Payer: MEDICARE

## 2020-11-13 VITALS
WEIGHT: 204 LBS | TEMPERATURE: 97.3 F | DIASTOLIC BLOOD PRESSURE: 70 MMHG | HEART RATE: 64 BPM | SYSTOLIC BLOOD PRESSURE: 128 MMHG | BODY MASS INDEX: 29.27 KG/M2 | OXYGEN SATURATION: 99 %

## 2020-11-13 PROBLEM — Z86.010 HISTORY OF COLONOSCOPY WITH POLYPECTOMY: Status: ACTIVE | Noted: 2020-11-13

## 2020-11-13 PROBLEM — Z86.0100 HISTORY OF COLONOSCOPY WITH POLYPECTOMY: Status: ACTIVE | Noted: 2020-11-13

## 2020-11-13 PROBLEM — Z98.890 HISTORY OF COLONOSCOPY WITH POLYPECTOMY: Status: ACTIVE | Noted: 2020-11-13

## 2020-11-13 PROCEDURE — 3044F HG A1C LEVEL LT 7.0%: CPT | Performed by: FAMILY MEDICINE

## 2020-11-13 PROCEDURE — G8484 FLU IMMUNIZE NO ADMIN: HCPCS | Performed by: FAMILY MEDICINE

## 2020-11-13 PROCEDURE — 4040F PNEUMOC VAC/ADMIN/RCVD: CPT | Performed by: FAMILY MEDICINE

## 2020-11-13 PROCEDURE — G0438 PPPS, INITIAL VISIT: HCPCS | Performed by: FAMILY MEDICINE

## 2020-11-13 PROCEDURE — 2022F DILAT RTA XM EVC RTNOPTHY: CPT | Performed by: FAMILY MEDICINE

## 2020-11-13 PROCEDURE — 1036F TOBACCO NON-USER: CPT | Performed by: FAMILY MEDICINE

## 2020-11-13 PROCEDURE — 83036 HEMOGLOBIN GLYCOSYLATED A1C: CPT | Performed by: FAMILY MEDICINE

## 2020-11-13 PROCEDURE — G8427 DOCREV CUR MEDS BY ELIG CLIN: HCPCS | Performed by: FAMILY MEDICINE

## 2020-11-13 PROCEDURE — 99213 OFFICE O/P EST LOW 20 MIN: CPT | Performed by: FAMILY MEDICINE

## 2020-11-13 PROCEDURE — G8417 CALC BMI ABV UP PARAM F/U: HCPCS | Performed by: FAMILY MEDICINE

## 2020-11-13 PROCEDURE — 1123F ACP DISCUSS/DSCN MKR DOCD: CPT | Performed by: FAMILY MEDICINE

## 2020-11-13 PROCEDURE — 3017F COLORECTAL CA SCREEN DOC REV: CPT | Performed by: FAMILY MEDICINE

## 2020-11-13 RX ORDER — TADALAFIL 5 MG/1
5 TABLET ORAL DAILY PRN
Qty: 90 TABLET | Refills: 1 | Status: SHIPPED | OUTPATIENT
Start: 2020-11-13 | End: 2021-06-01 | Stop reason: SDUPTHER

## 2020-11-13 RX ORDER — CIPROFLOXACIN 500 MG/1
TABLET, FILM COATED ORAL
Qty: 20 TABLET | Refills: 2 | Status: SHIPPED | OUTPATIENT
Start: 2020-11-13 | End: 2021-03-31

## 2020-11-13 ASSESSMENT — PATIENT HEALTH QUESTIONNAIRE - PHQ9
1. LITTLE INTEREST OR PLEASURE IN DOING THINGS: 0
SUM OF ALL RESPONSES TO PHQ QUESTIONS 1-9: 0
SUM OF ALL RESPONSES TO PHQ9 QUESTIONS 1 & 2: 0
2. FEELING DOWN, DEPRESSED OR HOPELESS: 0

## 2020-11-13 ASSESSMENT — LIFESTYLE VARIABLES
HOW OFTEN DO YOU HAVE A DRINK CONTAINING ALCOHOL: 3
HOW OFTEN DO YOU HAVE SIX OR MORE DRINKS ON ONE OCCASION: 0
AUDIT-C TOTAL SCORE: 3
HOW MANY STANDARD DRINKS CONTAINING ALCOHOL DO YOU HAVE ON A TYPICAL DAY: 0

## 2020-11-13 NOTE — PROGRESS NOTES
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 (:  1950) is a 79 y.o. male, here for evaluation of the following medical concerns:    HPI    Nocturia  - Lots of frequent trips  - Considered TURP but concerned about recovery time. Wants to consider Urolift  - PSA 0.79, 0.67  - Stopped taking flomax and finasteride d/t side effects--retrograde ejactulation and erectile dysfunction  - Takes tadaladil and sidenafil as needed  - Wants to see Dr Severiano Pennant    Colonoscopy  - biopsy taken--benign  - Needs a referral today    Diabetes Retinopathy Exam  - Referral to [facility]    Diabetes  - glipizide, janument, xochitldiace,   - -160  - A1c 6.5  - More alcohol he drinks + ice cream, the lower his BS is in the morning  - concerned about subclinical UTI or Prostatitis, since he feels like he does not totally empty his bladder every day. HTN  - Currently on losartan and cloathalidone  - No dizziness/light headedness, no headaches  - BP today 128/70    Basal cell carcinoma  - No new spots  - Dr Eugenie Toledo for Derm    Review of Systems  As per HPI, otherwise negative    Prior to Visit Medications    Medication Sig Taking?  Authorizing Provider   ciprofloxacin (CIPRO) 500 MG tablet TAKE 1 TABLET BY MOUTH TWICE DAILY FOR 10 DAYS Yes Jones Musa MD   tadalafil (CIALIS) 5 MG tablet Take 1 tablet by mouth daily as needed for Erectile Dysfunction Yes Jones Musa MD   losartan (COZAAR) 25 MG tablet Take 1 tablet by mouth once daily Yes Emelyn Gómez MD   JARDIANCE 25 MG tablet Take 1 tablet by mouth once daily Yes Jones Musa MD   glipiZIDE (GLUCOTROL) 10 MG tablet TAKE 1 TABLET BY MOUTH TWICE DAILY BEFORE MEAL(S) Yes Jones Musa MD   JANUMET  MG per tablet TAKE 1 TABLET BY MOUTH TWICE DAILY WITH MEALS Yes Jones Musa MD   atorvastatin (LIPITOR) 40 MG tablet Take 1 tablet by mouth once daily Yes Jones Musa MD   silodosin (RAPAFLO) 4 MG CAPS capsule Take 1 capsule by mouth every evening Yes Emelyn Gómez MD   sildenafil (REVATIO) 20 MG tablet Take 100mg daily PRN relations Yes Lester Musa MD   POTASSIUM CITRATE PO Take by mouth Yes Historical Provider, MD   chlorthalidone (HYGROTON) 25 MG tablet Take 1 tablet by mouth daily Yes Dar Montoya MD   VITAMIN A PO Take by mouth Yes Historical Provider, MD   GLUCOSAMINE-CHONDROITIN PO Take by mouth Yes Historical Provider, MD   aspirin EC 81 MG EC tablet Take 81 mg by mouth daily Yes Historical Provider, MD   HYDROQUINONE EX Apply topically Yes Historical Provider, MD   acetaminophen (TYLENOL) 325 MG tablet Take 650 mg by mouth every 6 hours as needed for Pain. Yes Historical Provider, MD   Cholecalciferol (VITAMIN D-3) 5000 UNITS TABS Take 1 tablet by mouth daily. Yes Historical Provider, MD   vitamin E 1000 UNITS capsule Take 1,000 Units by mouth daily. Yes Historical Provider, MD   ibuprofen (ADVIL;MOTRIN) 200 MG tablet Take 600 mg by mouth every 8 hours as needed for Pain or Fever. Yes Historical Provider, MD   finasteride (PROSCAR) 5 MG tablet TAKE 1 TABLET BY MOUTH ONCE DAILY  Lester Musa MD        Social History     Tobacco Use    Smoking status: Never Smoker    Smokeless tobacco: Never Used   Substance Use Topics    Alcohol use: Yes     Comment: occasionally        Vitals:    11/13/20 1059   BP: 128/70   Pulse: 64   Temp: 97.3 °F (36.3 °C)   SpO2: 99%   Weight: 204 lb (92.5 kg)     Estimated body mass index is 29.27 kg/m² as calculated from the following:    Height as of 5/22/20: 5' 10\" (1.778 m). Weight as of this encounter: 204 lb (92.5 kg). Physical Exam  Constitutional:       Appearance: He is well-developed. HENT:      Head: Normocephalic and atraumatic. Pulmonary:      Effort: Pulmonary effort is normal.   Skin:     Coloration: Skin is not pale.       Comments: Visual inspection:  Deformity/amputation: absent  Skin lesions/pre-ulcerative calluses: absent  Edema: right- negative, left- negative  Sensory exam:  Monofilament sensation: normal  (minimum of 5 random plantar locations tested, avoiding callused areas - > 1 area with absence of sensation is + for neuropathy)  Plus at least one of the following:  Pulses: normal,   Pinprick: N/A  Proprioception: Intact  Vibration (128 Hz): N/A     Neurological:      Mental Status: He is alert and oriented to person, place, and time. ASSESSMENT/PLAN:  1. Routine general medical examination at a health care facility      2. Type 2 diabetes mellitus with complication, without long-term current use of insulin ([address]  Remains well controlled with an A1c of 6.5. normal foot exam today  - POCT glycosylated hemoglobin (Hb A1C)  - External Referral To Ophthalmology  - Diabetic Foot Exam    3. Screen for colon cancer  - AFL - Ifeanyi Chappell MD, Gastroenterology, [facility]    4. BPH associated with nocturia  Referral for consideration of uroloift. Continue current BPH meds  - AFL - Darnell Milan MD, [facility], [facility]  - tadalafil (CIALIS) 5 MG tablet; Take 1 tablet by mouth daily as needed for Erectile Dysfunction  Dispense: 90 tablet; Refill: 1    5. Other male erectile dysfunction  - tadalafil (CIALIS) 5 MG tablet; Take 1 tablet by mouth daily as needed for Erectile Dysfunction  Dispense: 90 tablet; Refill: 1    6. Need for shingles vaccine  Can get at pharmacy, we discussed    7. Laboratory exam ordered as part of routine general medical examination  - LIPID PANEL; Future  - PSA, Prostatic Specific Antigen; Future  - TSH WITH REFLEX TO FT4; Future  - Comprehensive Metabolic Panel; Future      Return in 6 months (on 5/13/2021) for dm f/u. An electronic signature was used to authenticate this note.     --Nini Allan MD on 11/13/2020 at 2:25 PM

## 2020-11-13 NOTE — PROGRESS NOTES
Medicare Annual Wellness Visit  Name: Dr. Jeffrey Llanos Date: 2020   MRN: <[id number]> Sex: Male   Age: 79 y.o. Ethnicity: Non-/Non    : 1950 Race: Angy Cifuentes Dr. is here for Medicare AWV (referral for ophthamology, uro lift, colonoscopy questions)    Screenings for behavioral, psychosocial and functional/safety risks, and cognitive dysfunction are all negative except as indicated below. These results, as well as other patient data from the [address] form, are documented in Flowsheets linked to this Encounter. No Known Allergies      Prior to Visit Medications    Medication Sig Taking?  Authorizing Provider   ciprofloxacin (CIPRO) 500 MG tablet TAKE 1 TABLET BY MOUTH TWICE DAILY FOR 10 DAYS Yes Anya Musa MD   tadalafil (CIALIS) 5 MG tablet Take 1 tablet by mouth daily as needed for Erectile Dysfunction Yes Anya Musa MD   losartan (COZAAR) 25 MG tablet Take 1 tablet by mouth once daily Yes Anya Musa MD   JARDIANCE 25 MG tablet Take 1 tablet by mouth once daily Yes Anya Musa MD   glipiZIDE (GLUCOTROL) 10 MG tablet TAKE 1 TABLET BY MOUTH TWICE DAILY BEFORE MEAL(S) Yes Anya Musa MD   JANUMET  MG per tablet TAKE 1 TABLET BY MOUTH TWICE DAILY WITH MEALS Yes Anya Musa MD   atorvastatin (LIPITOR) 40 MG tablet Take 1 tablet by mouth once daily Yes Anya Musa MD   silodosin (RAPAFLO) 4 MG CAPS capsule Take 1 capsule by mouth every evening Yes Elijah Funes MD   sildenafil (REVATIO) 20 MG tablet Take 100mg daily PRN relations Yes Anya Musa MD   POTASSIUM CITRATE PO Take by mouth Yes Historical Provider, MD   chlorthalidone (HYGROTON) 25 MG tablet Take 1 tablet by mouth daily Yes Anya Musa MD   VITAMIN A PO Take by mouth Yes Historical Provider, MD   GLUCOSAMINE-CHONDROITIN PO Take by mouth Yes Historical Provider, MD   aspirin EC 81 MG EC tablet Take 81 mg by mouth daily Yes Historical Provider, MD   HYDROQUINONE EX Apply topically Yes Historical Provider, MD   acetaminophen (TYLENOL) 325 MG tablet Take 650 mg by mouth every 6 hours as needed for Pain. Yes Historical Provider, MD   Cholecalciferol (VITAMIN D-3) 5000 UNITS TABS Take 1 tablet by mouth daily. Yes Historical Provider, MD   vitamin E 1000 UNITS capsule Take 1,000 Units by mouth daily. Yes Historical Provider, MD   ibuprofen (ADVIL;MOTRIN) 200 MG tablet Take 600 mg by mouth every 8 hours as needed for Pain or Fever. 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 CareTeam (Including outside providers/suppliers regularly involved in providing care):   Patient Care Team:  Jose A Aguila MD as PCP - General (Family Medicine)  Jose A Aguila MD as PCP - REHABILITATION [facility] Empaneled Provider    Wt Readings from Last 3 Encounters:   11/13/20 204 lb (92.5 kg)   05/22/20 200 lb (90.7 kg)   04/29/20 200 lb (90.7 kg)     Vitals:    11/13/20 1059   BP: 128/70   Pulse: 64   Temp: 97.3 °F (36.3 °C)   SpO2: 99%   Weight: 204 lb (92.5 kg)     Body mass index is 29.27 kg/m². Based upon direct observation of the patient, evaluation of cognition reveals recent and remote memory intact. Patient's complete Health Risk Assessment and screening values have been reviewed and are found in Flowsheets. The following problems were reviewed today and where indicated follow up appointments were made and/or referrals ordered.     Positive Risk Factor Screenings with Interventions:     Health Habits/Nutrition:  Health Habits/Nutrition  Do you exercise for at least 20 minutes 2-3 times per week?: (!) No  Have you lost any weight without trying in the past 3 months?: No  Have you seen a dentist within the past year?: Yes     Health Habits/Nutrition Interventions:  · Discussed physical activity    Hearing/Vision:  No exam data present  Hearing/Vision  Do you or your family notice any trouble with your hearing?: No  Do you have difficulty driving, watching TV, or doing any of your daily activities because of your eyesight?: No  Have you had an eye exam within the past year?: (!) No  Hearing/Vision Interventions:  · Referral placed for ophtho    Safety:  Safety  Do you have working smoke detectors?: Yes  Have all throw rugs been removed or fastened?: (!) No  Do you have non-slip mats or surfaces in all bathtubs/showers?: (!) No  Do all of your stairways have a railing or banister?: Yes  Are your doorways, halls and stairs free of clutter?: Yes  Safety Interventions:  · Discussed fall risk and home safety    Personalized Preventive Plan   Current Health Maintenance Status  Immunization History   Administered Date(s) Administered    Pneumococcal Conjugate 13-valent ([id number]) 01/12/2017    Pneumococcal Polysaccharide ([id number]) 03/08/2019    Zoster Live (Zostavax) 09/11/2014        Health Maintenance   Topic Date Due    Hepatitis C screen  1950    Diabetic foot exam  10/28/1960    Diabetic retinal exam  10/28/1960    DTaP/Tdap/Td vaccine (1 - Tdap) 10/28/1969    Colon cancer screen colonoscopy  10/28/2000    Shingles Vaccine (2 of 3) 11/06/2014    Annual Wellness Visit (AWV)  05/29/2019    TSH testing  09/07/2019    Flu vaccine (1) 09/01/2020    Lipid screen  09/06/2020    A1C test (Diabetic or Prediabetic)  06/02/2021    Diabetic microalbuminuria test  06/02/2021    Potassium monitoring  06/02/2021    Creatinine monitoring  06/02/2021    Pneumococcal 65+ years Vaccine  Completed    Hepatitis A vaccine  Aged Out    Hib vaccine  Aged Out    Meningococcal (ACWY) vaccine  Aged Out     Recommendations for [place] Due: see orders and patient instructions/AVS.    Recommended screening schedule for the next 5-10 years is provided to the patient in written form: see Patient Instructions/AVS.    Alexia Irwin was seen today for medicare awv. Diagnoses and all orders for this visit:    Routine general medical examination at a health care facility    Type 2 diabetes mellitus with complication, without long-term current use of insulin ([facility] [address]  -     POCT glycosylated hemoglobin (Hb A1C)  -     External Referral To Ophthalmology    Screen for colon cancer  -     AFL - Janna Lynch MD, Gastroenterology, [facility]    BPH associated with nocturia  -     [facility]  -     tadalafil (CIALIS) 5 MG tablet; Take 1 tablet by mouth daily as needed for Erectile Dysfunction    Other male erectile dysfunction  -     tadalafil (CIALIS) 5 MG tablet; Take 1 tablet by mouth daily as needed for Erectile Dysfunction    Need for shingles vaccine  -     Cancel: Zoster recombinant [facility])    Laboratory exam ordered as part of routine general medical examination  -     LIPID PANEL; Future  -     PSA, Prostatic Specific Antigen; Future  -     TSH WITH REFLEX TO FT4; Future  -     Comprehensive Metabolic Panel;  Future    Other orders  -     ciprofloxacin (CIPRO) 500 MG tablet; TAKE 1 TABLET BY MOUTH TWICE DAILY FOR 10 DAYS

## 2020-11-13 NOTE — PATIENT INSTRUCTIONS
Personalized Preventive Plan for Dr. Sherice Reinoso 11/13/2020  Medicare offers a range of preventive health benefits. Some of the tests and screenings are paid in full while other may be subject to a deductible, co-insurance, and/or copay. Some of these benefits include a comprehensive review of your medical history including lifestyle, illnesses that may run in your family, and various assessments and screenings as appropriate. After reviewing your medical record and screening and assessments performed today your provider may have ordered immunizations, labs, imaging, and/or referrals for you. A list of these orders (if applicable) as well as your Preventive Care list are included within your After Visit Summary for your review. Other Preventive Recommendations:    · A preventive eye exam performed by an eye specialist is recommended every 1-2 years to screen for glaucoma; cataracts, macular degeneration, and other eye disorders. · A preventive dental visit is recommended every 6 months. · Try to get at least 150 minutes of exercise per week or 10,000 steps per day on a pedometer . · Order or download the FREE \"Exercise & Physical Activity: Your Everyday Guide\" from [employer]. Call 6-917.868.8637 or search [employer] online. · You need 9880-3852 mg of calcium and 4700-1623 IU of vitamin D per day. It is possible to meet your calcium requirement with diet alone, but a vitamin D supplement is usually necessary to meet this goal.  · When exposed to the sun, use a sunscreen that protects against both UVA and UVB radiation with an SPF of 30 or greater. Reapply every 2 to 3 hours or after sweating, drying off with a towel, or swimming. · Always wear a seat belt when traveling in a car. Always wear a helmet when riding a bicycle or motorcycle.

## 2020-11-17 DIAGNOSIS — Z00.00 LABORATORY EXAM ORDERED AS PART OF ROUTINE GENERAL MEDICAL EXAMINATION: ICD-10-CM

## 2020-11-17 LAB
A/G RATIO: 1.5 (ref 1.1–2.2)
ALBUMIN SERPL-MCNC: 4.3 G/DL (ref 3.4–5)
ALP BLD-CCNC: 66 U/L (ref 40–129)
ALT SERPL-CCNC: 27 U/L (ref 10–40)
ANION GAP SERPL CALCULATED.3IONS-SCNC: 15 MMOL/L (ref 3–16)
AST SERPL-CCNC: 27 U/L (ref 15–37)
BILIRUB SERPL-MCNC: <0.2 MG/DL (ref 0–1)
BUN BLDV-MCNC: 24 MG/DL (ref 7–20)
CALCIUM SERPL-MCNC: 9.3 MG/DL (ref 8.3–10.6)
CHLORIDE BLD-SCNC: 107 MMOL/L (ref 99–110)
CHOLESTEROL, TOTAL: 112 MG/DL (ref 0–199)
CO2: 20 MMOL/L (ref 21–32)
CREAT SERPL-MCNC: 0.9 MG/DL (ref 0.8–1.3)
GFR AFRICAN AMERICAN: >60
GFR NON-AFRICAN AMERICAN: >60
GLOBULIN: 2.9 G/DL
GLUCOSE BLD-MCNC: 137 MG/DL (ref 70–99)
HDLC SERPL-MCNC: 34 MG/DL (ref 40–60)
LDL CHOLESTEROL CALCULATED: ABNORMAL MG/DL
LDL CHOLESTEROL DIRECT: 44 MG/DL
POTASSIUM SERPL-SCNC: 4.3 MMOL/L (ref 3.5–5.1)
PROSTATE SPECIFIC ANTIGEN: 2.01 NG/ML (ref 0–4)
SODIUM BLD-SCNC: 142 MMOL/L (ref 136–145)
T4 FREE: 1 NG/DL (ref 0.9–1.8)
TOTAL PROTEIN: 7.2 G/DL (ref 6.4–8.2)
TRIGL SERPL-MCNC: 371 MG/DL (ref 0–150)
TSH REFLEX FT4: 5.39 UIU/ML (ref 0.27–4.2)
VLDLC SERPL CALC-MCNC: ABNORMAL MG/DL

## 2020-11-18 RX ORDER — SITAGLIPTIN AND METFORMIN HYDROCHLORIDE 1000; 50 MG/1; MG/1
TABLET, FILM COATED ORAL
Qty: 180 TABLET | Refills: 1 | Status: SHIPPED | OUTPATIENT
Start: 2020-11-18 | End: 2021-05-24

## 2020-11-24 RX ORDER — GLIPIZIDE 10 MG/1
TABLET ORAL
Qty: 180 TABLET | Refills: 0 | Status: SHIPPED | OUTPATIENT
Start: 2020-11-24 | End: 2021-03-08

## 2020-12-17 RX ORDER — SILDENAFIL CITRATE 20 MG/1
TABLET ORAL
Qty: 30 TABLET | Refills: 0 | Status: SHIPPED | OUTPATIENT
Start: 2020-12-17 | End: 2021-05-10

## 2021-01-18 ENCOUNTER — OFFICE VISIT (OUTPATIENT)
Dept: PRIMARY CARE CLINIC | Age: 71
End: 2021-01-18
Payer: MEDICARE

## 2021-01-18 DIAGNOSIS — Z01.812 PRE-PROCEDURAL LABORATORY EXAMINATIONS: Primary | ICD-10-CM

## 2021-01-18 LAB — SARS-COV-2: NOT DETECTED

## 2021-01-18 PROCEDURE — G8428 CUR MEDS NOT DOCUMENT: HCPCS | Performed by: NURSE PRACTITIONER

## 2021-01-18 PROCEDURE — G8417 CALC BMI ABV UP PARAM F/U: HCPCS | Performed by: NURSE PRACTITIONER

## 2021-01-18 PROCEDURE — 99211 OFF/OP EST MAY X REQ PHY/QHP: CPT | Performed by: NURSE PRACTITIONER

## 2021-01-18 NOTE — FLOWSHEET NOTE
[address] [facility] time___1/22/2021 0700_________        Surgery time_____0800_______    Take the following medications with a sip of water:    Do not eat or drink anything after 12:00 midnight prior to your surgery. This includes water chewing gum, mints and ice chips. You may brush your teeth and gargle the morning of your surgery, but do not swallow the water     Please see your family doctor/pediatrician for a history and physical and/or concerning medications. Bring any test results/reports from your physicians office. If you are under the care of a heart doctor or specialist doctor, please be aware that you may be asked to them for clearance    You may be asked to stop blood thinners such as Coumadin, Plavix, Fragmin, Lovenox, etc., or any anti-inflammatories such as:  Aspirin, Ibuprofen, Advil, Naproxen prior to your surgery. We also ask that you stop any OTC medications such as fish oil, vitamin E, glucosamine, garlic, Multivitamins, COQ 10, etc.    We ask that you do not smoke 24 hours prior to surgery  We ask that you do not  drink any alcoholic beverages 24 hours prior to surgery     You must make arrangements for a responsible adult to take you home after your surgery. For your safety you will not be allowed to leave alone or drive yourself home. Your surgery will be cancelled if you do not have a ride home. Also for your safety, it is strongly suggested that someone stay with you the first 24 hours after your surgery. A parent or legal guardian must accompany a child scheduled for surgery and plan to stay at the hospital until the child is discharged. Please do not bring other children with you. For your comfort, please wear simple loose fitting clothing to the hospital.  Please do not bring valuables.     Do not wear any make-up or nail polish on your fingers or toes      For your safety, please do not wear any jewelry or body piercing's on the day of surgery. All jewelry must be removed. If you have dentures, they will be removed before going to operating room. For your convenience, we will provide you with a container. If you wear contact lenses or glasses, they will be removed, please bring a case for them. If you have a living will and a durable power of  for healthcare, please bring in a copy. As part of our patient safety program to minimize surgical site infections, we ask you to do the following:    · Please notify your surgeon if you develop any illness between         now and the  day of your surgery. · This includes a cough, cold, fever, sore throat, nausea,         or vomiting, and diarrhea, etc.  ·  Please notify your surgeon if you experience dizziness, shortness         of breath or blurred vision between now and the time of your surgery. Do not shave your operative site 96 hours prior to surgery. For face and neck surgery, men may use an electric razor 48 hours   prior to surgery. You may shower the night before surgery or the morning of   your surgery with an antibacterial soap. You will need to bring a photo ID and insurance card    [facility] has an onsite pharmacy, would you like to utilize our pharmacy     If you will be staying overnight and use a C-pap machine, please bring   your C-pap to hospital     Our goal is to provide you with excellent care, therefore, visitors will be limited to two(2) in the room at a time so that we may focus on providing this care for you. Please contact pre-admission testing if you have any further questions. [facility] phone number:  [phone]  Please note these are generalized instructions for all surgical cases, you may be provided with more specific instructions according to your surgery.

## 2021-01-21 ENCOUNTER — ANESTHESIA EVENT (OUTPATIENT)
Dept: ENDOSCOPY | Age: 71
End: 2021-01-21
Payer: MEDICARE

## 2021-01-22 ENCOUNTER — HOSPITAL ENCOUNTER (OUTPATIENT)
Age: 71
Setting detail: OUTPATIENT SURGERY
Discharge: HOME | End: 2021-01-22
Attending: INTERNAL MEDICINE | Admitting: INTERNAL MEDICINE
Payer: MEDICARE

## 2021-01-22 ENCOUNTER — ANESTHESIA (OUTPATIENT)
Dept: ENDOSCOPY | Age: 71
End: 2021-01-22
Payer: MEDICARE

## 2021-01-22 VITALS
HEART RATE: 63 BPM | SYSTOLIC BLOOD PRESSURE: 124 MMHG | BODY MASS INDEX: 27.86 KG/M2 | DIASTOLIC BLOOD PRESSURE: 70 MMHG | TEMPERATURE: 98.5 F | RESPIRATION RATE: 16 BRPM | OXYGEN SATURATION: 98 % | HEIGHT: 70 IN | WEIGHT: 194.6 LBS

## 2021-01-22 VITALS — DIASTOLIC BLOOD PRESSURE: 80 MMHG | SYSTOLIC BLOOD PRESSURE: 124 MMHG | OXYGEN SATURATION: 96 %

## 2021-01-22 DIAGNOSIS — Z86.010 HISTORY OF COLON POLYPS: ICD-10-CM

## 2021-01-22 LAB
GLUCOSE BLD-MCNC: 151 MG/DL (ref 70–99)
PERFORMED ON: ABNORMAL

## 2021-01-22 PROCEDURE — 2500000003 HC RX 250 WO HCPCS: Performed by: NURSE ANESTHETIST, CERTIFIED REGISTERED

## 2021-01-22 PROCEDURE — 7100000010 HC PHASE II RECOVERY - FIRST 15 MIN: Performed by: INTERNAL MEDICINE

## 2021-01-22 PROCEDURE — 3609010300 HC COLONOSCOPY W/BIOPSY SINGLE/MULTIPLE: Performed by: INTERNAL MEDICINE

## 2021-01-22 PROCEDURE — 7100000011 HC PHASE II RECOVERY - ADDTL 15 MIN: Performed by: INTERNAL MEDICINE

## 2021-01-22 PROCEDURE — 88305 TISSUE EXAM BY PATHOLOGIST: CPT

## 2021-01-22 PROCEDURE — 2709999900 HC NON-CHARGEABLE SUPPLY: Performed by: INTERNAL MEDICINE

## 2021-01-22 PROCEDURE — 3700000000 HC ANESTHESIA ATTENDED CARE: Performed by: INTERNAL MEDICINE

## 2021-01-22 PROCEDURE — 3700000001 HC ADD 15 MINUTES (ANESTHESIA): Performed by: INTERNAL MEDICINE

## 2021-01-22 PROCEDURE — 6360000002 HC RX W HCPCS: Performed by: NURSE ANESTHETIST, CERTIFIED REGISTERED

## 2021-01-22 PROCEDURE — 2580000003 HC RX 258: Performed by: ANESTHESIOLOGY

## 2021-01-22 RX ORDER — SODIUM CHLORIDE 9 MG/ML
INJECTION, SOLUTION INTRAVENOUS CONTINUOUS
Status: DISCONTINUED | OUTPATIENT
Start: 2021-01-22 | End: 2021-01-22 | Stop reason: HOSPADM

## 2021-01-22 RX ORDER — SODIUM CHLORIDE 0.9 % (FLUSH) 0.9 %
10 SYRINGE (ML) INJECTION EVERY 12 HOURS SCHEDULED
Status: DISCONTINUED | OUTPATIENT
Start: 2021-01-22 | End: 2021-01-22 | Stop reason: HOSPADM

## 2021-01-22 RX ORDER — PROPOFOL 10 MG/ML
INJECTION, EMULSION INTRAVENOUS PRN
Status: DISCONTINUED | OUTPATIENT
Start: 2021-01-22 | End: 2021-01-22 | Stop reason: SDUPTHER

## 2021-01-22 RX ORDER — LIDOCAINE HYDROCHLORIDE 20 MG/ML
INJECTION, SOLUTION EPIDURAL; INFILTRATION; INTRACAUDAL; PERINEURAL PRN
Status: DISCONTINUED | OUTPATIENT
Start: 2021-01-22 | End: 2021-01-22 | Stop reason: SDUPTHER

## 2021-01-22 RX ORDER — ONDANSETRON 2 MG/ML
4 INJECTION INTRAMUSCULAR; INTRAVENOUS
Status: DISCONTINUED | OUTPATIENT
Start: 2021-01-22 | End: 2021-01-22 | Stop reason: HOSPADM

## 2021-01-22 RX ORDER — SODIUM CHLORIDE 0.9 % (FLUSH) 0.9 %
10 SYRINGE (ML) INJECTION PRN
Status: DISCONTINUED | OUTPATIENT
Start: 2021-01-22 | End: 2021-01-22 | Stop reason: HOSPADM

## 2021-01-22 RX ORDER — PROPOFOL 10 MG/ML
INJECTION, EMULSION INTRAVENOUS CONTINUOUS PRN
Status: DISCONTINUED | OUTPATIENT
Start: 2021-01-22 | End: 2021-01-22 | Stop reason: SDUPTHER

## 2021-01-22 RX ADMIN — LIDOCAINE HYDROCHLORIDE 50 MG: 20 INJECTION, SOLUTION EPIDURAL; INFILTRATION; INTRACAUDAL; PERINEURAL at 08:14

## 2021-01-22 RX ADMIN — PROPOFOL 150 MCG/KG/MIN: 10 INJECTION, EMULSION INTRAVENOUS at 08:14

## 2021-01-22 RX ADMIN — SODIUM CHLORIDE: 9 INJECTION, SOLUTION INTRAVENOUS at 07:38

## 2021-01-22 RX ADMIN — PROPOFOL 80 MG: 10 INJECTION, EMULSION INTRAVENOUS at 08:14

## 2021-01-22 ASSESSMENT — LIFESTYLE VARIABLES: SMOKING_STATUS: 0

## 2021-01-22 ASSESSMENT — ENCOUNTER SYMPTOMS: SHORTNESS OF BREATH: 0

## 2021-01-22 NOTE — ANESTHESIA POSTPROCEDURE EVALUATION
Department of Anesthesiology  Postprocedure Note    Patient: Susie Reilly Dr.  MRN: [id number]  YOB: 1950  Date of evaluation: 1/22/2021  Time:  9:11 AM     Procedure Summary     Date: 01/22/21 Room / Location: [place] [address] 01 / [address] [facility]    Anesthesia Start: 0828 Anesthesia Stop: 7281    Procedure: COLONOSCOPY WITH BIOPSY (N/A ) Diagnosis:       History of colon polyps      (History of Colon Polyps)    Surgeons: Rodney Alicia MD Responsible Provider: Geovanna Hargrove MD    Anesthesia Type: MAC ASA Status: 3          Anesthesia Type: MAC    Trudi Phase I: Trudi Score: 10    Trudi Phase II: Trudi Score: 10    Last vitals: Reviewed and per EMR flowsheets.        Anesthesia Post Evaluation    Patient location during evaluation: PACU  Patient participation: complete - patient participated  Level of consciousness: awake and alert  Airway patency: patent  Nausea & Vomiting: no nausea and no vomiting  Complications: no  Cardiovascular status: hemodynamically stable  Respiratory status: acceptable  Hydration status: stable

## 2021-01-22 NOTE — ANESTHESIA PRE PROCEDURE
Department of Anesthesiology  Preprocedure Note       Name:  Twan Dawson Dr.   Age:  79 y.o.  :  1950                                          MRN:  [id number]         Date:  2021      Surgeon: Maude Jose):  Ray Reynolds MD    Procedure: Procedure(s):  COLORECTAL CANCER SCREENING, NOT HIGH RISK    Medications prior to admission:   Prior to Admission medications    Medication Sig Start Date End Date Taking? Authorizing Provider   sildenafil (REVATIO) 20 MG tablet TAKE 1 TABLET BY MOUTH ONCE DAILY AS NEEDED 20  Yes Tello Musa MD   atorvastatin (LIPITOR) 40 MG tablet Take 1 tablet by mouth once daily 20  Yes Tello Musa MD   glipiZIDE (GLUCOTROL) 10 MG tablet TAKE 1 TABLET BY MOUTH TWICE DAILY BEFORE MEAL(S) 20  Yes Tello Musa MD   JANUMET  MG per tablet TAKE 1 TABLET BY MOUTH TWICE DAILY WITH MEALS 20  Yes Tello Musa MD   ciprofloxacin (CIPRO) 500 MG tablet TAKE 1 TABLET BY MOUTH TWICE DAILY FOR 10 DAYS 20  Yes Tello Musa MD   tadalafil (CIALIS) 5 MG tablet Take 1 tablet by mouth daily as needed for Erectile Dysfunction 20  Yes Tello Musa MD   losartan (COZAAR) 25 MG tablet Take 1 tablet by mouth once daily 20  Yes Tello Musa MD   JARDIANCE 25 MG tablet Take 1 tablet by mouth once daily 9/15/20  Yes Tello Musa MD   VITAMIN A PO Take by mouth   Yes Historical Provider, MD   GLUCOSAMINE-CHONDROITIN PO Take by mouth   Yes Historical Provider, MD   aspirin EC 81 MG EC tablet Take 81 mg by mouth daily   Yes Historical Provider, MD   acetaminophen (TYLENOL) 325 MG tablet Take 650 mg by mouth every 6 hours as needed for Pain. Yes Historical Provider, MD   Cholecalciferol (VITAMIN D-3) 5000 UNITS TABS Take 1 tablet by mouth daily. Yes Historical Provider, MD   vitamin E 1000 UNITS capsule Take 1,000 Units by mouth daily.    Yes Historical Provider, MD

## 2021-01-22 NOTE — H&P
[facility]   Pre-operative History and Physical    Patient: Dr. Kitty Diggs Calixto: 1950  Acct#: [de-identified]    History Obtained From: electronic medical record    HISTORY OF PRESENT ILLNESS  Procedure:Colonoscopy  Indications:surveillance  Past Medical History:        Diagnosis Date    Cancer ([facility] [address]     [address] R nasal tip    Diabetes mellitus ([facility] [address]     Fracture cervical vertebra-closed ([facility] [address] 3/16/13     Past Surgical History:        Procedure Laterality Date    HERNIA REPAIR      MOHS SURGERY  10/02/2018    right nasal tip    TONSILLECTOMY       Medications prior to admission:   Prior to Admission medications    Medication Sig Start Date End Date Taking? Authorizing Provider   sildenafil (REVATIO) 20 MG tablet TAKE 1 TABLET BY MOUTH ONCE DAILY AS NEEDED 12/17/20  Yes Marielena Musa MD   atorvastatin (LIPITOR) 40 MG tablet Take 1 tablet by mouth once daily 12/16/20  Yes Marielena Musa MD   glipiZIDE (GLUCOTROL) 10 MG tablet TAKE 1 TABLET BY MOUTH TWICE DAILY BEFORE MEAL(S) 11/24/20  Yes Marielena Musa MD   JANUMET  MG per tablet TAKE 1 TABLET BY MOUTH TWICE DAILY WITH MEALS 11/18/20  Yes Marielena Musa MD   ciprofloxacin (CIPRO) 500 MG tablet TAKE 1 TABLET BY MOUTH TWICE DAILY FOR 10 DAYS 11/13/20  Yes Marielena Musa MD   tadalafil (CIALIS) 5 MG tablet Take 1 tablet by mouth daily as needed for Erectile Dysfunction 11/13/20  Yes Marielena Musa MD   losartan (COZAAR) 25 MG tablet Take 1 tablet by mouth once daily 11/5/20  Yes Marielena Musa MD   JARDIANCE 25 MG tablet Take 1 tablet by mouth once daily 9/15/20  Yes Marielena Musa MD   VITAMIN A PO Take by mouth   Yes Historical Provider, MD   GLUCOSAMINE-CHONDROITIN PO Take by mouth   Yes Historical Provider, MD   aspirin EC 81 MG EC tablet Take 81 mg by mouth daily   Yes Historical Provider, MD   acetaminophen (TYLENOL) 325 MG tablet Take 650 mg by mouth every 6 hours as needed for Pain.    Yes Historical Provider, MD   Cholecalciferol (VITAMIN D-3) 5000 UNITS TABS Take 1 tablet by mouth daily. Yes Historical Provider, MD   vitamin E 1000 UNITS capsule Take 1,000 Units by mouth daily. Yes Historical Provider, MD   ibuprofen (ADVIL;MOTRIN) 200 MG tablet Take 600 mg by mouth every 8 hours as needed for Pain or Fever.    Yes Historical Provider, MD     Allergies:   No known allergies    Social History     Socioeconomic History    Marital status:      Spouse name: Not on file    Number of children: Not on file    Years of education: Not on file    Highest education level: Not on file   Occupational History    Occupation: Chiropractor   Social Needs    Financial resource strain: Not on file    Food insecurity     Worry: Not on file     Inability: Not on file   [employer] needs     Medical: Not on file     Non-medical: Not on file   Tobacco Use    Smoking status: Never Smoker    Smokeless tobacco: Never Used   Substance and Sexual Activity    Alcohol use: Yes     Comment: occasionally    Drug use: No    Sexual activity: Yes     Partners: Female   Lifestyle    Physical activity     Days per week: Not on file     Minutes per session: Not on file    Stress: Not on file   Relationships    Social connections     Talks on phone: Not on file     Gets together: Not on file     Attends Mosque service: Not on file     Active member of club or organization: Not on file     Attends meetings of clubs or organizations: Not on file     Relationship status: Not on file    Intimate partner violence     Fear of current or ex partner: Not on file     Emotionally abused: Not on file     Physically abused: Not on file     Forced sexual activity: Not on file   Other Topics Concern    Not on file   Social History Narrative    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 Procedure options, risks and benefits reviewed with patient and expresses understanding.

## 2021-01-25 ENCOUNTER — TELEPHONE (OUTPATIENT)
Dept: FAMILY MEDICINE CLINIC | Age: 71
End: 2021-01-25

## 2021-02-08 RX ORDER — LOSARTAN POTASSIUM 25 MG/1
TABLET ORAL
Qty: 90 TABLET | Refills: 0 | Status: SHIPPED | OUTPATIENT
Start: 2021-02-08 | End: 2021-06-01

## 2021-02-26 ENCOUNTER — OFFICE VISIT (OUTPATIENT)
Dept: FAMILY MEDICINE CLINIC | Age: 71
End: 2021-02-26
Payer: MEDICARE

## 2021-02-26 VITALS
OXYGEN SATURATION: 99 % | WEIGHT: 202 LBS | BODY MASS INDEX: 28.28 KG/M2 | HEART RATE: 64 BPM | DIASTOLIC BLOOD PRESSURE: 70 MMHG | HEIGHT: 71 IN | RESPIRATION RATE: 12 BRPM | SYSTOLIC BLOOD PRESSURE: 128 MMHG

## 2021-02-26 DIAGNOSIS — E11.8 TYPE 2 DIABETES MELLITUS WITH COMPLICATION, WITHOUT LONG-TERM CURRENT USE OF INSULIN (HCC): ICD-10-CM

## 2021-02-26 DIAGNOSIS — Z01.810 PREOP CARDIOVASCULAR EXAM: Primary | ICD-10-CM

## 2021-02-26 DIAGNOSIS — R35.1 BPH ASSOCIATED WITH NOCTURIA: ICD-10-CM

## 2021-02-26 DIAGNOSIS — N40.1 BPH ASSOCIATED WITH NOCTURIA: ICD-10-CM

## 2021-02-26 LAB — HBA1C MFR BLD: 6.4 %

## 2021-02-26 PROCEDURE — 2022F DILAT RTA XM EVC RTNOPTHY: CPT | Performed by: FAMILY MEDICINE

## 2021-02-26 PROCEDURE — G8427 DOCREV CUR MEDS BY ELIG CLIN: HCPCS | Performed by: FAMILY MEDICINE

## 2021-02-26 PROCEDURE — G8484 FLU IMMUNIZE NO ADMIN: HCPCS | Performed by: FAMILY MEDICINE

## 2021-02-26 PROCEDURE — 99213 OFFICE O/P EST LOW 20 MIN: CPT | Performed by: FAMILY MEDICINE

## 2021-02-26 PROCEDURE — 83036 HEMOGLOBIN GLYCOSYLATED A1C: CPT | Performed by: FAMILY MEDICINE

## 2021-02-26 PROCEDURE — G8417 CALC BMI ABV UP PARAM F/U: HCPCS | Performed by: FAMILY MEDICINE

## 2021-02-26 PROCEDURE — 93000 ELECTROCARDIOGRAM COMPLETE: CPT | Performed by: FAMILY MEDICINE

## 2021-02-26 RX ORDER — HYDROXYZINE HYDROCHLORIDE 25 MG/1
TABLET, FILM COATED ORAL
COMMUNITY
Start: 2021-02-01 | End: 2022-04-13

## 2021-02-26 ASSESSMENT — ENCOUNTER SYMPTOMS
COLOR CHANGE: 0
EYE REDNESS: 0
VOMITING: 0
DIARRHEA: 0
SHORTNESS OF BREATH: 0
SINUS PRESSURE: 0
SORE THROAT: 0
COUGH: 0
NAUSEA: 0

## 2021-02-26 ASSESSMENT — PATIENT HEALTH QUESTIONNAIRE - PHQ9
SUM OF ALL RESPONSES TO PHQ QUESTIONS 1-9: 0
1. LITTLE INTEREST OR PLEASURE IN DOING THINGS: 0

## 2021-02-26 NOTE — PROGRESS NOTES
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  male who presents to the office today for a preoperative consultation at the request of surgeon Dr. Alivia Son who plans on performing Urolift on 3/5/21    The problem warranting surgery is BPH with nocturia and has been going on for years    Risk factors include:    Diabetes: yes, well controlled  Coronary Artery Disease: none known  COPD: no    Planned anesthesia is General.   History of anesthesia problems?  No, has tolerated general anesthesia well in the past  No history of bleeding disorder or clotting disorder    Patient Active Problem List   Diagnosis    Cervical spine fracture (HCC)    HTN (hypertension)    History of nephrolithiasis    History of basal cell carcinoma    History of prostatitis    Type 2 diabetes mellitus with complication, without long-term current use of insulin ([address]    Subclinical hypothyroidism    Other male erectile dysfunction    Rosacea    Basal cell carcinoma of left cheek    DDD (degenerative disc disease), cervical    History of colonoscopy with polypectomy - 2014, Q5 years     Past Medical History:   Diagnosis Date    Cancer ([address]     [address] R nasal tip    Diabetes mellitus ([address]     Fracture cervical vertebra-closed ([address] 3/16/13     Past Surgical History:   Procedure Laterality Date    COLONOSCOPY N/A 1/22/2021    COLONOSCOPY WITH BIOPSY performed by Phan Spencer MD at [address] [place] [address] SURGERY  10/02/2018    right nasal tip    TONSILLECTOMY       Family History   Problem Relation Age of Onset    Alzheimer's Disease Mother     Cancer Father     Diabetes Father      Social History     Socioeconomic History    Marital status:      Spouse name: None    Number of children: None    Years of education: None    Highest education level: None   Occupational History  Occupation: Chiropractor   Social Needs    Financial resource strain: None    Food insecurity     Worry: None     Inability: None    Transportation needs     Medical: None     Non-medical: None   Tobacco Use    Smoking status: Never Smoker    Smokeless tobacco: Never Used   Substance and Sexual Activity    Alcohol use: Yes     Comment: occasionally    Drug use: No    Sexual activity: Yes     Partners: Female   Lifestyle    Physical activity     Days per week: None     Minutes per session: None    Stress: None   Relationships    Social connections     Talks on phone: None     Gets together: None     Attends Congregation service: None     Active member of club or organization: None     Attends meetings of clubs or organizations: None     Relationship status: None    Intimate partner violence     Fear of current or ex partner: None     Emotionally abused: None     Physically abused: None     Forced sexual activity: None   Other Topics Concern    None   Social History Narrative    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  Authorizing Provider   hydrOXYzine (ATARAX) 25 MG tablet  Yes Historical Provider, MD   losartan (COZAAR) 25 MG tablet Take 1 tablet by mouth once daily Yes Kay Coughlin MD   sildenafil (REVATIO) 20 MG tablet TAKE 1 TABLET BY MOUTH ONCE DAILY AS NEEDED Yes Lavern Musa MD   atorvastatin (LIPITOR) 40 MG tablet Take 1 tablet by mouth once daily Yes Lavern Musa MD   glipiZIDE (GLUCOTROL) 10 MG tablet TAKE 1 TABLET BY MOUTH TWICE DAILY BEFORE MEAL(S) Yes Lavern Musa MD   JANUMET  MG per tablet TAKE 1 TABLET BY MOUTH TWICE DAILY WITH MEALS Yes Lavern Musa MD   tadalafil (CIALIS) 5 MG tablet Take 1 tablet by mouth daily as needed for Erectile Dysfunction Yes Lavern Musa MD   JARDIANCE 25 MG tablet Take 1 tablet by mouth once daily Yes Kay Coughlin MD VITAMIN A PO Take by mouth Yes Historical Provider, MD   GLUCOSAMINE-CHONDROITIN PO Take by mouth Yes Historical Provider, MD   aspirin EC 81 MG EC tablet Take 81 mg by mouth daily Yes Historical Provider, MD   acetaminophen (TYLENOL) 325 MG tablet Take 650 mg by mouth every 6 hours as needed for Pain. Yes Historical Provider, MD   vitamin E 1000 UNITS capsule Take 1,000 Units by mouth daily. Yes Historical Provider, MD   ciprofloxacin (CIPRO) 500 MG tablet TAKE 1 TABLET BY MOUTH TWICE DAILY FOR 10 DAYS  Patient not taking: Reported on 2/26/2021  Tello Musa MD   Cholecalciferol (VITAMIN D-3) 5000 UNITS TABS Take 1 tablet by mouth daily. Historical Provider, MD   ibuprofen (ADVIL;MOTRIN) 200 MG tablet Take 600 mg by mouth every 8 hours as needed for Pain or Fever. Historical Provider, MD     Review of Systems   Constitutional: Negative for chills and fever. HENT: Negative for congestion, sinus pressure and sore throat. Eyes: Negative for redness and visual disturbance. Respiratory: Negative for cough and shortness of breath. Cardiovascular: Negative for chest pain and leg swelling. Gastrointestinal: Negative for diarrhea, nausea and vomiting. Genitourinary: Negative for difficulty urinating. Skin: Negative for color change and rash. Neurological: Negative for dizziness and headaches. Psychiatric/Behavioral: Negative for confusion. Objective:     Vitals:    02/26/21 0912   BP: 128/70   Site: Right Upper Arm   Position: Sitting   Cuff Size: Large Adult   Pulse: 64   Resp: 12   SpO2: 99%   Weight: 202 lb (91.6 kg)   Height: 5' 11\" (1.803 m)       Physical Exam  Constitutional:       Appearance: He is well-developed. HENT:      Head: Normocephalic and atraumatic. Eyes:      Conjunctiva/sclera: Conjunctivae normal.   Neck:      Musculoskeletal: Normal range of motion and neck supple. Thyroid: No thyromegaly. Cardiovascular:      Rate and Rhythm: Normal rate and regular rhythm. Patient advised to hold blood thinning medication (including aspirin and NSAIDs) 7 days prior to procedure. Prophylaxis for cardiac events with perioperative beta-blockers: not indicated    Deep vein thrombosis prophylaxis postoperatively:regimen to be chosen by surgicalteam if applicable. Other measures: Postoperative incentive spirometry to prevent pneumonia. Thank you for assisting me in the care of this patient.

## 2021-03-08 RX ORDER — GLIPIZIDE 10 MG/1
TABLET ORAL
Qty: 180 TABLET | Refills: 0 | Status: SHIPPED | OUTPATIENT
Start: 2021-03-08 | End: 2021-06-03

## 2021-03-14 DIAGNOSIS — E11.8 TYPE 2 DIABETES MELLITUS WITH COMPLICATION, WITHOUT LONG-TERM CURRENT USE OF INSULIN (HCC): ICD-10-CM

## 2021-03-15 RX ORDER — EMPAGLIFLOZIN 25 MG/1
TABLET, FILM COATED ORAL
Qty: 90 TABLET | Refills: 0 | Status: SHIPPED | OUTPATIENT
Start: 2021-03-15 | End: 2021-06-03

## 2021-03-17 ENCOUNTER — TELEPHONE (OUTPATIENT)
Dept: DERMATOLOGY | Age: 71
End: 2021-03-17

## 2021-03-17 NOTE — TELEPHONE ENCOUNTER
Pt calling states he is having a terrible itch on both of his arms have some concern have a appt on 4/20 pls return call back @ [phone] to discuss

## 2021-03-31 ENCOUNTER — OFFICE VISIT (OUTPATIENT)
Dept: DERMATOLOGY | Age: 71
End: 2021-03-31
Payer: MEDICARE

## 2021-03-31 VITALS — TEMPERATURE: 97.2 F

## 2021-03-31 DIAGNOSIS — L85.9 HYPERKERATOSIS: ICD-10-CM

## 2021-03-31 DIAGNOSIS — L57.0 ACTINIC KERATOSIS: ICD-10-CM

## 2021-03-31 DIAGNOSIS — Z85.828 HISTORY OF BASAL CELL CARCINOMA: ICD-10-CM

## 2021-03-31 DIAGNOSIS — L30.9 CHRONIC DERMATITIS: Primary | ICD-10-CM

## 2021-03-31 PROCEDURE — 99213 OFFICE O/P EST LOW 20 MIN: CPT | Performed by: DERMATOLOGY

## 2021-03-31 PROCEDURE — 1036F TOBACCO NON-USER: CPT | Performed by: DERMATOLOGY

## 2021-03-31 PROCEDURE — G8484 FLU IMMUNIZE NO ADMIN: HCPCS | Performed by: DERMATOLOGY

## 2021-03-31 PROCEDURE — 3017F COLORECTAL CA SCREEN DOC REV: CPT | Performed by: DERMATOLOGY

## 2021-03-31 PROCEDURE — G8417 CALC BMI ABV UP PARAM F/U: HCPCS | Performed by: DERMATOLOGY

## 2021-03-31 PROCEDURE — 17003 DESTRUCT PREMALG LES 2-14: CPT | Performed by: DERMATOLOGY

## 2021-03-31 PROCEDURE — 17000 DESTRUCT PREMALG LESION: CPT | Performed by: DERMATOLOGY

## 2021-03-31 PROCEDURE — G8427 DOCREV CUR MEDS BY ELIG CLIN: HCPCS | Performed by: DERMATOLOGY

## 2021-03-31 PROCEDURE — 1123F ACP DISCUSS/DSCN MKR DOCD: CPT | Performed by: DERMATOLOGY

## 2021-03-31 PROCEDURE — 4040F PNEUMOC VAC/ADMIN/RCVD: CPT | Performed by: DERMATOLOGY

## 2021-03-31 RX ORDER — TRIAMCINOLONE ACETONIDE 1 MG/G
CREAM TOPICAL
Qty: 80 G | Refills: 0 | Status: SHIPPED | OUTPATIENT
Start: 2021-03-31 | End: 2021-09-29

## 2021-03-31 NOTE — PROGRESS NOTES
Outpatient Medications Marked as Taking for the 3/31/21 encounter (Office Visit) with Nikolay Stone MD   Medication Sig Dispense Refill    triamcinolone (KENALOG) 0.1 % cream Apply to affected areas on the forearms and hips twice daily for up to 2 weeks or until improved. 80 g 0    JARDIANCE 25 MG tablet Take 1 tablet by mouth once daily 90 tablet 0    glipiZIDE (GLUCOTROL) 10 MG tablet TAKE 1 TABLET BY MOUTH TWICE DAILY BEFORE MEAL(S) 180 tablet 0    hydrOXYzine (ATARAX) 25 MG tablet       losartan (COZAAR) 25 MG tablet Take 1 tablet by mouth once daily 90 tablet 0    sildenafil (REVATIO) 20 MG tablet TAKE 1 TABLET BY MOUTH ONCE DAILY AS NEEDED 30 tablet 0    atorvastatin (LIPITOR) 40 MG tablet Take 1 tablet by mouth once daily 90 tablet 3    JANUMET  MG per tablet TAKE 1 TABLET BY MOUTH TWICE DAILY WITH MEALS 180 tablet 1    tadalafil (CIALIS) 5 MG tablet Take 1 tablet by mouth daily as needed for Erectile Dysfunction 90 tablet 1    VITAMIN A PO Take by mouth      GLUCOSAMINE-CHONDROITIN PO Take by mouth      aspirin EC 81 MG EC tablet Take 81 mg by mouth daily      acetaminophen (TYLENOL) 325 MG tablet Take 650 mg by mouth every 6 hours as needed for Pain.  Cholecalciferol (VITAMIN D-3) 5000 UNITS TABS Take 1 tablet by mouth daily.  vitamin E 1000 UNITS capsule Take 1,000 Units by mouth daily.  ibuprofen (ADVIL;MOTRIN) 200 MG tablet Take 600 mg by mouth every 8 hours as needed for Pain or Fever. Physical Examination       The following were examined and determined to be normal: Psych/Neuro, Scalp/hair, Head/face, Conjunctivae/eyelids, Gums/teeth/lips, Neck, Breast/axilla/chest, Abdomen, Back and Nails/digits. The following were examined and determined to be abnormal: RUE, LUE, RLE and LLE. Well appearing. 1.  Forearms, right > left, ill defined scaly and excoriated erythematous patches. 2.  Left medial forefoot - hyperkeratosis and fissuring. 3. Frontal scalp - 3 keratotic pink macules. 4.  Clear. Assessment and Plan     1. Chronic dermatitis of the forearms and right hip, new diagnosis    Triamcinolone 0.1% cream twice daily for up to 2 weeks or until improved. Cream based moisturizer daily. 2. Hyperkeratosis     Aquaphor and petrolatum nightly. Can file down after showers. 3. Actinic keratoses - 3    2 cycles of liquid nitrogen applied to 3 AKs on the frontal scalp. Patient was educated regarding the potential risks of blister formation, discomfort. Wound care was discussed. 4. History of basal cell carcinomas - clear    Sun protective behaviors, including use of at least SPF 30 sunscreen, and self skin examinations were encouraged. Call for any new or concerning lesions. Return in about 6 months (around 9/30/2021).     --Danyelle Harris MD

## 2021-05-10 RX ORDER — SILDENAFIL CITRATE 20 MG/1
TABLET ORAL
Qty: 30 TABLET | Refills: 0 | Status: SHIPPED | OUTPATIENT
Start: 2021-05-10 | End: 2022-02-22

## 2021-05-24 RX ORDER — SITAGLIPTIN AND METFORMIN HYDROCHLORIDE 1000; 50 MG/1; MG/1
TABLET, FILM COATED ORAL
Qty: 180 TABLET | Refills: 0 | Status: SHIPPED | OUTPATIENT
Start: 2021-05-24 | End: 2021-08-23

## 2021-06-01 DIAGNOSIS — N52.8 OTHER MALE ERECTILE DYSFUNCTION: ICD-10-CM

## 2021-06-01 DIAGNOSIS — R35.1 BPH ASSOCIATED WITH NOCTURIA: ICD-10-CM

## 2021-06-01 DIAGNOSIS — N40.1 BPH ASSOCIATED WITH NOCTURIA: ICD-10-CM

## 2021-06-01 RX ORDER — TADALAFIL 5 MG/1
5 TABLET ORAL DAILY PRN
Qty: 90 TABLET | Refills: 0 | Status: SHIPPED | OUTPATIENT
Start: 2021-06-01 | End: 2021-10-01

## 2021-06-01 RX ORDER — LOSARTAN POTASSIUM 25 MG/1
TABLET ORAL
Qty: 90 TABLET | Refills: 0 | Status: SHIPPED | OUTPATIENT
Start: 2021-06-01 | End: 2021-09-02

## 2021-06-03 DIAGNOSIS — E11.8 TYPE 2 DIABETES MELLITUS WITH COMPLICATION, WITHOUT LONG-TERM CURRENT USE OF INSULIN (HCC): ICD-10-CM

## 2021-06-03 RX ORDER — EMPAGLIFLOZIN 25 MG/1
TABLET, FILM COATED ORAL
Qty: 90 TABLET | Refills: 0 | Status: SHIPPED | OUTPATIENT
Start: 2021-06-03 | End: 2021-09-23

## 2021-06-03 RX ORDER — GLIPIZIDE 10 MG/1
TABLET ORAL
Qty: 180 TABLET | Refills: 0 | Status: SHIPPED | OUTPATIENT
Start: 2021-06-03 | End: 2021-09-20

## 2021-08-23 RX ORDER — SITAGLIPTIN AND METFORMIN HYDROCHLORIDE 1000; 50 MG/1; MG/1
TABLET, FILM COATED ORAL
Qty: 180 TABLET | Refills: 0 | OUTPATIENT
Start: 2021-08-23

## 2021-08-31 ENCOUNTER — TELEPHONE (OUTPATIENT)
Dept: FAMILY MEDICINE CLINIC | Age: 71
End: 2021-08-31

## 2021-08-31 NOTE — TELEPHONE ENCOUNTER
Called pt  Stated that he was just returning a call. Looked and found that pt was called to schedule a follow up with Dr. Galdino Rosenbaum.     Scheduled appt on 9.22.2021 at 7:40am

## 2021-08-31 NOTE — TELEPHONE ENCOUNTER
----- Message from Elisabethian Jaredlaura sent at 8/31/2021  9:08 AM EDT -----  Subject: Appointment Request    Reason for Call: Routine Medicare AWV    QUESTIONS  Type of Appointment? Established Patient  Reason for appointment request? Available appointments did not meet   patient need  Additional Information for Provider? patient is requesting AWV and refills   renewed, but no available appts. Please give pt a call.  ---------------------------------------------------------------------------  --------------  CALL BACK INFO  What is the best way for the office to contact you? OK to leave message on   voicemail  Preferred Call Back Phone Number? [phone]  ---------------------------------------------------------------------------  --------------  SCRIPT ANSWERS  Relationship to Patient? Self  (If the patient has Medicare as their primary insurance coverage ask this   question) Are you requesting a Medicare Annual Wellness Visit? Yes   (Is the patient requesting a pap smear with their physical exam?)? No  (Is the patient requesting their annual physical and does not need PAP or   AWV per above?)? No  Have you been diagnosed with, awaiting test results for, or told that you   are suspected of having COVID-19 (Coronavirus)? (If patient has tested   negative or was tested as a requirement for work, school, or travel and   not based on symptoms, answer no)? No  Do you currently have flu-like symptoms including fever or chills, cough,   shortness of breath, difficulty breathing, or new loss of taste or smell? No  Have you had close contact with someone with COVID-19 in the last 14 days? No  (Service Expert  click yes below to proceed with [place] [place] As Usual   Scheduling)?  Yes

## 2021-09-02 RX ORDER — LOSARTAN POTASSIUM 25 MG/1
TABLET ORAL
Qty: 90 TABLET | Refills: 0 | Status: SHIPPED | OUTPATIENT
Start: 2021-09-02 | End: 2022-01-17

## 2021-09-20 RX ORDER — GLIPIZIDE 10 MG/1
TABLET ORAL
Qty: 180 TABLET | Refills: 0 | Status: SHIPPED | OUTPATIENT
Start: 2021-09-20 | End: 2021-12-21

## 2021-09-22 ENCOUNTER — OFFICE VISIT (OUTPATIENT)
Dept: FAMILY MEDICINE CLINIC | Age: 71
End: 2021-09-22
Payer: MEDICARE

## 2021-09-22 VITALS
DIASTOLIC BLOOD PRESSURE: 72 MMHG | RESPIRATION RATE: 18 BRPM | SYSTOLIC BLOOD PRESSURE: 140 MMHG | HEIGHT: 71 IN | WEIGHT: 203 LBS | OXYGEN SATURATION: 97 % | HEART RATE: 58 BPM | BODY MASS INDEX: 28.42 KG/M2

## 2021-09-22 DIAGNOSIS — E78.2 MIXED HYPERLIPIDEMIA: ICD-10-CM

## 2021-09-22 DIAGNOSIS — N40.0 BENIGN PROSTATIC HYPERPLASIA WITHOUT LOWER URINARY TRACT SYMPTOMS: ICD-10-CM

## 2021-09-22 DIAGNOSIS — E03.8 SUBCLINICAL HYPOTHYROIDISM: ICD-10-CM

## 2021-09-22 DIAGNOSIS — I10 ESSENTIAL HYPERTENSION: ICD-10-CM

## 2021-09-22 DIAGNOSIS — E11.8 TYPE 2 DIABETES MELLITUS WITH COMPLICATION, WITHOUT LONG-TERM CURRENT USE OF INSULIN (HCC): Primary | ICD-10-CM

## 2021-09-22 LAB — HBA1C MFR BLD: 8 %

## 2021-09-22 PROCEDURE — 4040F PNEUMOC VAC/ADMIN/RCVD: CPT | Performed by: FAMILY MEDICINE

## 2021-09-22 PROCEDURE — 83036 HEMOGLOBIN GLYCOSYLATED A1C: CPT | Performed by: FAMILY MEDICINE

## 2021-09-22 PROCEDURE — 3017F COLORECTAL CA SCREEN DOC REV: CPT | Performed by: FAMILY MEDICINE

## 2021-09-22 PROCEDURE — 3052F HG A1C>EQUAL 8.0%<EQUAL 9.0%: CPT | Performed by: FAMILY MEDICINE

## 2021-09-22 PROCEDURE — 99214 OFFICE O/P EST MOD 30 MIN: CPT | Performed by: FAMILY MEDICINE

## 2021-09-22 PROCEDURE — G8417 CALC BMI ABV UP PARAM F/U: HCPCS | Performed by: FAMILY MEDICINE

## 2021-09-22 PROCEDURE — 2022F DILAT RTA XM EVC RTNOPTHY: CPT | Performed by: FAMILY MEDICINE

## 2021-09-22 PROCEDURE — 1123F ACP DISCUSS/DSCN MKR DOCD: CPT | Performed by: FAMILY MEDICINE

## 2021-09-22 PROCEDURE — G8427 DOCREV CUR MEDS BY ELIG CLIN: HCPCS | Performed by: FAMILY MEDICINE

## 2021-09-22 PROCEDURE — 1036F TOBACCO NON-USER: CPT | Performed by: FAMILY MEDICINE

## 2021-09-22 NOTE — PROGRESS NOTES
9/22/2021    This is a 79 y.o. male   Chief Complaint   Patient presents with    Follow-up     Follow up for diabetes. Pt said he has not been as good at taking his meds.  Medication Refill     HPI    Diabetes  Lab Results   Component Value Date    LABA1C 6.4 (A) 02/26/2021     Lab Results   Component Value Date    .7 06/02/2020     Thyroid  Lab Results   Component Value Date    TSHFT4 5.39 (H) 11/17/2020    TSH 3.28 09/07/2018     HLD  Lab Results   Component Value Date    LDLCALC see below 11/17/2020    LDLDIRECT 44 11/17/2020   - on Lipitor 40mg    HTN  BP Readings from Last 3 Encounters:   09/22/21 (!) 140/72   02/26/21 128/70   01/22/21 124/80   - typically 120s/70s when checking at home and work      Review of Systems     Current Outpatient Medications   Medication Sig Dispense Refill    glipiZIDE (GLUCOTROL) 10 MG tablet TAKE 1 TABLET BY MOUTH TWICE DAILY BEFORE MEAL(S) 180 tablet 0    losartan (COZAAR) 25 MG tablet Take 1 tablet by mouth once daily 90 tablet 0    JANUMET  MG per tablet TAKE 1 TABLET BY MOUTH TWICE DAILY WITH MEALS 180 tablet 0    JARDIANCE 25 MG tablet Take 1 tablet by mouth once daily 90 tablet 0    tadalafil (CIALIS) 5 MG tablet Take 1 tablet by mouth daily as needed for Erectile Dysfunction 90 tablet 0    sildenafil (REVATIO) 20 MG tablet TAKE 1 TABLET BY MOUTH ONCE DAILY AS NEEDED 30 tablet 0    atorvastatin (LIPITOR) 40 MG tablet Take 1 tablet by mouth once daily 90 tablet 3    VITAMIN A PO Take by mouth      GLUCOSAMINE-CHONDROITIN PO Take by mouth      aspirin EC 81 MG EC tablet Take 81 mg by mouth daily      acetaminophen (TYLENOL) 325 MG tablet Take 650 mg by mouth every 6 hours as needed for Pain.  Cholecalciferol (VITAMIN D-3) 5000 UNITS TABS Take 1 tablet by mouth daily.  vitamin E 1000 UNITS capsule Take 1,000 Units by mouth daily.  ibuprofen (ADVIL;MOTRIN) 200 MG tablet Take 600 mg by mouth every 8 hours as needed for Pain or Fever.  triamcinolone (KENALOG) 0.1 % cream Apply to affected areas on the forearms and hips twice daily for up to 2 weeks or until improved. (Patient not taking: Reported on 9/22/2021) 80 g 0    hydrOXYzine (ATARAX) 25 MG tablet  (Patient not taking: Reported on 9/22/2021)       No current facility-administered medications for this visit. BP (!) 140/72 (Site: Left Upper Arm, Position: Sitting, Cuff Size: Large Adult)   Pulse 58   Resp 18   Ht 5' 11\" (1.803 m)   Wt 203 lb (92.1 kg)   SpO2 97%   BMI 28.31 kg/m²     Physical Exam    Wt Readings from Last 3 Encounters:   09/22/21 203 lb (92.1 kg)   02/26/21 202 lb (91.6 kg)   01/22/21 194 lb 9.6 oz (88.3 kg)       BP Readings from Last 3 Encounters:   09/22/21 (!) 140/72   02/26/21 128/70   01/22/21 124/80     Assessment/Plan:  1. Type 2 diabetes mellitus with complication, without long-term current use of insulin (HCC)  - POCT glycosylated hemoglobin (Hb A1C)  - Comprehensive Metabolic Panel; Future  - Vitamin B12; Future    2. Essential hypertension  - Comprehensive Metabolic Panel; Future    3. Subclinical hypothyroidism  - TSH without Reflex; Future    4. Mixed hyperlipidemia  - Lipid Panel; Future    5. Benign prostatic hyperplasia without lower urinary tract symptoms  - PSA, Prostatic Specific Antigen; Future    A1c up to 8. Has been experiencing some side effects from Pricilla Bonneres but will retry this. If not improving would consider Trulicity once weekly  Continue other current medications and check labs as above      Return in about 6 months (around 3/22/2022) for dm f/u.

## 2021-09-23 DIAGNOSIS — E11.8 TYPE 2 DIABETES MELLITUS WITH COMPLICATION, WITHOUT LONG-TERM CURRENT USE OF INSULIN (HCC): ICD-10-CM

## 2021-09-23 RX ORDER — EMPAGLIFLOZIN 25 MG/1
TABLET, FILM COATED ORAL
Qty: 90 TABLET | Refills: 0 | Status: SHIPPED | OUTPATIENT
Start: 2021-09-23 | End: 2021-10-06

## 2021-09-29 ENCOUNTER — OFFICE VISIT (OUTPATIENT)
Dept: DERMATOLOGY | Age: 71
End: 2021-09-29
Payer: MEDICARE

## 2021-09-29 VITALS — TEMPERATURE: 97 F

## 2021-09-29 DIAGNOSIS — L57.0 ACTINIC KERATOSIS: ICD-10-CM

## 2021-09-29 DIAGNOSIS — L81.4 SOLAR LENTIGINOSIS: ICD-10-CM

## 2021-09-29 DIAGNOSIS — L30.9 CHRONIC DERMATITIS: Primary | ICD-10-CM

## 2021-09-29 PROCEDURE — 3017F COLORECTAL CA SCREEN DOC REV: CPT | Performed by: DERMATOLOGY

## 2021-09-29 PROCEDURE — 17003 DESTRUCT PREMALG LES 2-14: CPT | Performed by: DERMATOLOGY

## 2021-09-29 PROCEDURE — G8427 DOCREV CUR MEDS BY ELIG CLIN: HCPCS | Performed by: DERMATOLOGY

## 2021-09-29 PROCEDURE — 17000 DESTRUCT PREMALG LESION: CPT | Performed by: DERMATOLOGY

## 2021-09-29 PROCEDURE — 99213 OFFICE O/P EST LOW 20 MIN: CPT | Performed by: DERMATOLOGY

## 2021-09-29 PROCEDURE — G8417 CALC BMI ABV UP PARAM F/U: HCPCS | Performed by: DERMATOLOGY

## 2021-09-29 PROCEDURE — 4040F PNEUMOC VAC/ADMIN/RCVD: CPT | Performed by: DERMATOLOGY

## 2021-09-29 PROCEDURE — 1123F ACP DISCUSS/DSCN MKR DOCD: CPT | Performed by: DERMATOLOGY

## 2021-09-29 PROCEDURE — 1036F TOBACCO NON-USER: CPT | Performed by: DERMATOLOGY

## 2021-09-29 RX ORDER — MOMETASONE FUROATE 1 MG/G
CREAM TOPICAL
Qty: 45 G | Refills: 2 | Status: SHIPPED | OUTPATIENT
Start: 2021-09-29

## 2021-09-29 NOTE — PROGRESS NOTES
[facility]  Paulina Aadms MD  [phone]      [address]  1950    79 y.o. male     Date of Visit: 9/29/2021    Chief Complaint: skin lesions    History of Present Illness:    1. He has a history of chronic dermatitis on the forearms and right hip. It was treated with triamcinolone 0.1% cream at last visit without improvement. Has recurred 2 or 3 times. 2.  He also has a history of AK's - complains of several lesions on the scalp and face. 3.  He has stable pigmented lesions on the chest, shoulders and back. 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 Lionel Turcios on 1/21/2020. Nodular BCC on the left central cheektreated with Mohs by Dr. Lionel Turcios on 8/6/2019. Nodular BCC on the R nasal tiptreated with Mohs by Dr. Lionel Turcios on 10/2/2018. Nodular BCC of the left nasal alatreated with Mohs by Dr. Marii Hernandez in April 2014. Superficial BCC of the right shouldertreated with curettage in April 2014. BCC of the right mid cheektreated with Mohs by Dr. Agus Smart Sept 2012. Pigmented nodular BCC the right mandibletreated with Mohs in [facility] 7274. Superficial BCC of the right cheektreated with Mohs by Dr. Marii Hernandez in December 2009.     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 Has a home in [place].        Review of Systems:  Gen: Feels well, good sense of health. Skin: No new or changing moles. Past Medical History, Family History, Surgical History, Medications and Allergies reviewed.     Past Medical History:   Diagnosis Date    [facility]     [address] R nasal tip    Diabetes mellitus ([facility]     Fracture cervical vertebra-closed ([facility] 3/16/13     Past Surgical History:   Procedure Laterality Date    COLONOSCOPY N/A 1/22/2021    COLONOSCOPY WITH BIOPSY performed by Dolores Swartz MD at [address]  SURGERY  10/02/2018    right nasal tip    TONSILLECTOMY         Allergies   Allergen Reactions    No Known Allergies      Outpatient Medications Marked as Taking for the 9/29/21 encounter (Office Visit) with Tacho Reyes MD   Medication Sig Dispense Refill    mometasone (ELOCON) 0.1 % cream Apply to affected areas of the skin twice daily for up 2 weeks or until improved. For dermatitis. 45 g 2    JARDIANCE 25 MG tablet Take 1 tablet by mouth once daily 90 tablet 0    glipiZIDE (GLUCOTROL) 10 MG tablet TAKE 1 TABLET BY MOUTH TWICE DAILY BEFORE MEAL(S) 180 tablet 0    losartan (COZAAR) 25 MG tablet Take 1 tablet by mouth once daily 90 tablet 0    JANUMET  MG per tablet TAKE 1 TABLET BY MOUTH TWICE DAILY WITH MEALS 180 tablet 0    sildenafil (REVATIO) 20 MG tablet TAKE 1 TABLET BY MOUTH ONCE DAILY AS NEEDED 30 tablet 0    hydrOXYzine (ATARAX) 25 MG tablet       atorvastatin (LIPITOR) 40 MG tablet Take 1 tablet by mouth once daily 90 tablet 3    VITAMIN A PO Take by mouth      GLUCOSAMINE-CHONDROITIN PO Take by mouth      aspirin EC 81 MG EC tablet Take 81 mg by mouth daily      acetaminophen (TYLENOL) 325 MG tablet Take 650 mg by mouth every 6 hours as needed for Pain.  Cholecalciferol (VITAMIN D-3) 5000 UNITS TABS Take 1 tablet by mouth daily.  vitamin E 1000 UNITS capsule Take 1,000 Units by mouth daily.  ibuprofen (ADVIL;MOTRIN) 200 MG tablet Take 600 mg by mouth every 8 hours as needed for Pain or Fever. Physical Examination       The following were examined and determined to be normal: Psych/Neuro, Conjunctivae/eyelids, Gums/teeth/lips, Neck, Abdomen, Back, RUE, LUE, RLE, LLE and Nails/digits. The following were examined and determined to be abnormal: Scalp/hair, Head/face and Breast/axilla/chest.     Well appearing. 1.  Left anterior shoulder - ill defined excoriated and scaly erythematous patches.       2.  Left frontal scalp - 4, right forehead - 5, right temple - 3: ill defined keratotic pink macules. 3. Upper trunk/shoulders with multiple smooth brown macules and patches. Assessment and Plan     1. Chronic dermatitis - limited to the left shoulder today    Mometasone cream twice daily until improved. 2. Actinic keratosis - several    2 cycles of liquid nitrogen applied to 12 AKs: Left frontal scalp - 4, right forehead - 5, right temple - 3. Patient was educated regarding the potential risks of blister formation and discomfort. Wound care was discussed. 3. Solar lentiginosis     Monitor for change. Continue sun protective behaviors. Return in about 6 months (around 3/29/2022).     --Iliana Aguirre MD

## 2021-10-01 DIAGNOSIS — N52.8 OTHER MALE ERECTILE DYSFUNCTION: ICD-10-CM

## 2021-10-01 DIAGNOSIS — R35.1 BPH ASSOCIATED WITH NOCTURIA: ICD-10-CM

## 2021-10-01 DIAGNOSIS — N40.1 BPH ASSOCIATED WITH NOCTURIA: ICD-10-CM

## 2021-10-01 DIAGNOSIS — E11.8 TYPE 2 DIABETES MELLITUS WITH COMPLICATION, WITHOUT LONG-TERM CURRENT USE OF INSULIN (HCC): ICD-10-CM

## 2021-10-01 RX ORDER — TADALAFIL 5 MG/1
TABLET ORAL
Qty: 90 TABLET | Refills: 0 | Status: SHIPPED | OUTPATIENT
Start: 2021-10-01 | End: 2021-12-20

## 2021-10-01 RX ORDER — EMPAGLIFLOZIN 25 MG/1
TABLET, FILM COATED ORAL
Qty: 90 TABLET | Refills: 0 | OUTPATIENT
Start: 2021-10-01

## 2021-10-05 DIAGNOSIS — E11.8 TYPE 2 DIABETES MELLITUS WITH COMPLICATION, WITHOUT LONG-TERM CURRENT USE OF INSULIN (HCC): ICD-10-CM

## 2021-10-06 RX ORDER — EMPAGLIFLOZIN 25 MG/1
TABLET, FILM COATED ORAL
Qty: 90 TABLET | Refills: 0 | Status: SHIPPED | OUTPATIENT
Start: 2021-10-06 | End: 2022-05-06

## 2021-11-19 ENCOUNTER — OFFICE VISIT (OUTPATIENT)
Dept: FAMILY MEDICINE CLINIC | Age: 71
End: 2021-11-19
Payer: MEDICARE

## 2021-11-19 VITALS
HEIGHT: 71 IN | BODY MASS INDEX: 28.28 KG/M2 | HEART RATE: 57 BPM | RESPIRATION RATE: 18 BRPM | DIASTOLIC BLOOD PRESSURE: 68 MMHG | SYSTOLIC BLOOD PRESSURE: 130 MMHG | OXYGEN SATURATION: 98 % | WEIGHT: 202 LBS

## 2021-11-19 DIAGNOSIS — Z00.00 ROUTINE GENERAL MEDICAL EXAMINATION AT A HEALTH CARE FACILITY: Primary | ICD-10-CM

## 2021-11-19 PROCEDURE — 1123F ACP DISCUSS/DSCN MKR DOCD: CPT | Performed by: FAMILY MEDICINE

## 2021-11-19 PROCEDURE — 3017F COLORECTAL CA SCREEN DOC REV: CPT | Performed by: FAMILY MEDICINE

## 2021-11-19 PROCEDURE — G8484 FLU IMMUNIZE NO ADMIN: HCPCS | Performed by: FAMILY MEDICINE

## 2021-11-19 PROCEDURE — 4040F PNEUMOC VAC/ADMIN/RCVD: CPT | Performed by: FAMILY MEDICINE

## 2021-11-19 PROCEDURE — G0439 PPPS, SUBSEQ VISIT: HCPCS | Performed by: FAMILY MEDICINE

## 2021-11-19 ASSESSMENT — LIFESTYLE VARIABLES
AUDIT-C TOTAL SCORE: 2
HAVE YOU OR SOMEONE ELSE BEEN INJURED AS A RESULT OF YOUR DRINKING: 0
HOW MANY STANDARD DRINKS CONTAINING ALCOHOL DO YOU HAVE ON A TYPICAL DAY: 0
HOW OFTEN DURING THE LAST YEAR HAVE YOU BEEN UNABLE TO REMEMBER WHAT HAPPENED THE NIGHT BEFORE BECAUSE YOU HAD BEEN DRINKING: 0
HOW OFTEN DURING THE LAST YEAR HAVE YOU HAD A FEELING OF GUILT OR REMORSE AFTER DRINKING: 0
AUDIT TOTAL SCORE: 2
HOW OFTEN DO YOU HAVE A DRINK CONTAINING ALCOHOL: 2
HOW OFTEN DURING THE LAST YEAR HAVE YOU NEEDED AN ALCOHOLIC DRINK FIRST THING IN THE MORNING TO GET YOURSELF GOING AFTER A NIGHT OF HEAVY DRINKING: 0
HOW OFTEN DURING THE LAST YEAR HAVE YOU FAILED TO DO WHAT WAS NORMALLY EXPECTED FROM YOU BECAUSE OF DRINKING: 0
HAS A RELATIVE, FRIEND, DOCTOR, OR ANOTHER HEALTH PROFESSIONAL EXPRESSED CONCERN ABOUT YOUR DRINKING OR SUGGESTED YOU CUT DOWN: 0
HOW OFTEN DO YOU HAVE SIX OR MORE DRINKS ON ONE OCCASION: 0
HOW OFTEN DURING THE LAST YEAR HAVE YOU FOUND THAT YOU WERE NOT ABLE TO STOP DRINKING ONCE YOU HAD STARTED: 0

## 2021-11-19 ASSESSMENT — PATIENT HEALTH QUESTIONNAIRE - PHQ9
2. FEELING DOWN, DEPRESSED OR HOPELESS: 0
1. LITTLE INTEREST OR PLEASURE IN DOING THINGS: 0
SUM OF ALL RESPONSES TO PHQ QUESTIONS 1-9: 0
SUM OF ALL RESPONSES TO PHQ9 QUESTIONS 1 & 2: 0
SUM OF ALL RESPONSES TO PHQ QUESTIONS 1-9: 0
SUM OF ALL RESPONSES TO PHQ QUESTIONS 1-9: 0

## 2021-11-19 NOTE — PATIENT INSTRUCTIONS
Personalized Preventive Plan for Dr. Justin Marquez 11/19/2021  Medicare offers a range of preventive health benefits. Some of the tests and screenings are paid in full while other may be subject to a deductible, co-insurance, and/or copay. Some of these benefits include a comprehensive review of your medical history including lifestyle, illnesses that may run in your family, and various assessments and screenings as appropriate. After reviewing your medical record and screening and assessments performed today your provider may have ordered immunizations, labs, imaging, and/or referrals for you. A list of these orders (if applicable) as well as your Preventive Care list are included within your After Visit Summary for your review. Other Preventive Recommendations:    · A preventive eye exam performed by an eye specialist is recommended every 1-2 years to screen for glaucoma; cataracts, macular degeneration, and other eye disorders. · A preventive dental visit is recommended every 6 months. · Try to get at least 150 minutes of exercise per week or 10,000 steps per day on a pedometer . · Order or download the FREE \"Exercise & Physical Activity: Your Everyday Guide\" from [employer]. Call 8-421.954.2630 or search [employer] online. · You need 8061-4537 mg of calcium and 2913-7971 IU of vitamin D per day. It is possible to meet your calcium requirement with diet alone, but a vitamin D supplement is usually necessary to meet this goal.  · When exposed to the sun, use a sunscreen that protects against both UVA and UVB radiation with an SPF of 30 or greater. Reapply every 2 to 3 hours or after sweating, drying off with a towel, or swimming. · Always wear a seat belt when traveling in a car. Always wear a helmet when riding a bicycle or motorcycle.

## 2021-12-06 RX ORDER — SITAGLIPTIN AND METFORMIN HYDROCHLORIDE 1000; 50 MG/1; MG/1
TABLET, FILM COATED ORAL
Qty: 180 TABLET | Refills: 0 | Status: SHIPPED | OUTPATIENT
Start: 2021-12-06 | End: 2022-02-22

## 2021-12-16 RX ORDER — ATORVASTATIN CALCIUM 40 MG/1
TABLET, FILM COATED ORAL
Qty: 90 TABLET | Refills: 0 | Status: SHIPPED | OUTPATIENT
Start: 2021-12-16 | End: 2022-03-16

## 2021-12-20 DIAGNOSIS — N40.1 BPH ASSOCIATED WITH NOCTURIA: ICD-10-CM

## 2021-12-20 DIAGNOSIS — N52.8 OTHER MALE ERECTILE DYSFUNCTION: ICD-10-CM

## 2021-12-20 DIAGNOSIS — R35.1 BPH ASSOCIATED WITH NOCTURIA: ICD-10-CM

## 2021-12-20 RX ORDER — TADALAFIL 5 MG/1
TABLET ORAL
Qty: 90 TABLET | Refills: 0 | Status: SHIPPED | OUTPATIENT
Start: 2021-12-20 | End: 2022-03-30

## 2022-02-07 ENCOUNTER — TELEPHONE (OUTPATIENT)
Dept: FAMILY MEDICINE CLINIC | Age: 72
End: 2022-02-07

## 2022-02-07 NOTE — TELEPHONE ENCOUNTER
Pt has had rapid heartbeat for a week no chest pain ekg was abnormal pt is scheduled weds at noon please call Todd Cristobal if any questions

## 2022-02-08 ENCOUNTER — TELEPHONE (OUTPATIENT)
Dept: FAMILY MEDICINE CLINIC | Age: 72
End: 2022-02-08

## 2022-02-08 NOTE — TELEPHONE ENCOUNTER
Spoke with pt  He got in with Cardiology.   Can we fax an image of his prior EKG with us to Cardiology group at [facility]?  We can cancel his 12pm tomorrow  Thank you

## 2022-02-22 RX ORDER — SITAGLIPTIN AND METFORMIN HYDROCHLORIDE 1000; 50 MG/1; MG/1
TABLET, FILM COATED ORAL
Qty: 180 TABLET | Refills: 1 | Status: SHIPPED | OUTPATIENT
Start: 2022-02-22 | End: 2022-05-31

## 2022-02-22 RX ORDER — SILDENAFIL CITRATE 20 MG/1
TABLET ORAL
Qty: 30 TABLET | Refills: 5 | Status: SHIPPED | OUTPATIENT
Start: 2022-02-22 | End: 2022-05-24

## 2022-03-16 RX ORDER — ATORVASTATIN CALCIUM 40 MG/1
TABLET, FILM COATED ORAL
Qty: 90 TABLET | Refills: 0 | Status: SHIPPED | OUTPATIENT
Start: 2022-03-16 | End: 2022-06-20

## 2022-03-30 DIAGNOSIS — N40.1 BPH ASSOCIATED WITH NOCTURIA: ICD-10-CM

## 2022-03-30 DIAGNOSIS — N52.8 OTHER MALE ERECTILE DYSFUNCTION: ICD-10-CM

## 2022-03-30 DIAGNOSIS — R35.1 BPH ASSOCIATED WITH NOCTURIA: ICD-10-CM

## 2022-03-30 RX ORDER — TADALAFIL 5 MG/1
TABLET ORAL
Qty: 90 TABLET | Refills: 0 | Status: SHIPPED | OUTPATIENT
Start: 2022-03-30 | End: 2022-06-27

## 2022-04-04 ENCOUNTER — TELEPHONE (OUTPATIENT)
Dept: DERMATOLOGY | Age: 72
End: 2022-04-04

## 2022-04-04 NOTE — TELEPHONE ENCOUNTER
Dr Tamika Moon patient  Pt c/b #[phone]  Pt stated  Forgot he had an appt coming up to see dr Tamika Moon on 4/6 and he also has to see his heart doctor that day and have a few CT appt as well. Pt will like to be contacted if any cancellations become clarice.

## 2022-04-13 ENCOUNTER — OFFICE VISIT (OUTPATIENT)
Dept: DERMATOLOGY | Age: 72
End: 2022-04-13
Payer: MEDICARE

## 2022-04-13 VITALS — TEMPERATURE: 97 F

## 2022-04-13 DIAGNOSIS — D48.5 NEOPLASM OF UNCERTAIN BEHAVIOR OF SKIN: ICD-10-CM

## 2022-04-13 DIAGNOSIS — L81.4 SOLAR LENTIGINOSIS: ICD-10-CM

## 2022-04-13 DIAGNOSIS — L57.0 ACTINIC KERATOSIS: Primary | ICD-10-CM

## 2022-04-13 PROCEDURE — 17003 DESTRUCT PREMALG LES 2-14: CPT | Performed by: DERMATOLOGY

## 2022-04-13 PROCEDURE — 17000 DESTRUCT PREMALG LESION: CPT | Performed by: DERMATOLOGY

## 2022-04-13 PROCEDURE — 11102 TANGNTL BX SKIN SINGLE LES: CPT | Performed by: DERMATOLOGY

## 2022-04-13 PROCEDURE — 1036F TOBACCO NON-USER: CPT | Performed by: DERMATOLOGY

## 2022-04-13 PROCEDURE — 4040F PNEUMOC VAC/ADMIN/RCVD: CPT | Performed by: DERMATOLOGY

## 2022-04-13 PROCEDURE — 99212 OFFICE O/P EST SF 10 MIN: CPT | Performed by: DERMATOLOGY

## 2022-04-13 PROCEDURE — G8427 DOCREV CUR MEDS BY ELIG CLIN: HCPCS | Performed by: DERMATOLOGY

## 2022-04-13 PROCEDURE — 3017F COLORECTAL CA SCREEN DOC REV: CPT | Performed by: DERMATOLOGY

## 2022-04-13 PROCEDURE — 1123F ACP DISCUSS/DSCN MKR DOCD: CPT | Performed by: DERMATOLOGY

## 2022-04-13 PROCEDURE — G8417 CALC BMI ABV UP PARAM F/U: HCPCS | Performed by: DERMATOLOGY

## 2022-04-13 RX ORDER — ASCORBIC ACID 250 MG
250 TABLET ORAL DAILY
COMMUNITY

## 2022-04-13 RX ORDER — METOPROLOL SUCCINATE 100 MG/1
TABLET, EXTENDED RELEASE ORAL
COMMUNITY
Start: 2022-04-04 | End: 2022-04-29

## 2022-04-13 RX ORDER — METOPROLOL SUCCINATE 100 MG/1
100 TABLET, EXTENDED RELEASE ORAL DAILY
COMMUNITY
Start: 2022-03-07 | End: 2022-04-29

## 2022-04-13 RX ORDER — APIXABAN 5 MG/1
5 TABLET, FILM COATED ORAL 2 TIMES DAILY
Status: ON HOLD | COMMUNITY
Start: 2022-04-12 | End: 2022-05-26 | Stop reason: HOSPADM

## 2022-04-13 RX ORDER — PANTOPRAZOLE SODIUM 40 MG/1
40 TABLET, DELAYED RELEASE ORAL 2 TIMES DAILY
COMMUNITY
Start: 2022-04-13 | End: 2022-10-26

## 2022-04-13 RX ORDER — NIACIN 500 MG
500 TABLET ORAL EVERY EVENING
COMMUNITY

## 2022-04-13 RX ORDER — BIOTIN 1000 MCG
1000 TABLET,CHEWABLE ORAL DAILY
COMMUNITY
End: 2022-10-26

## 2022-04-13 NOTE — PROGRESS NOTES
[facility]  Jamie Clemons MD  [phone]      [address]  1950    70 y.o. male     Date of Visit: 4/13/2022    Chief Complaint: skin lesions    History of Present Illness:    1. He complains of a persistent crusted lesion on the frontal scalp. 2.  He reports few pigmented lesions on the left temple. 3.  He has a stable pink lesion on the left forearm. 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 Re Tavares on 1/21/2020. Nodular BCC on the left central cheek-treated with Mohs by Dr. Re Tavares on 8/6/2019. Nodular BCC on the R nasal tip-treated with Mohs by Dr. Re Tavares on 10/2/2018. Nodular BCC of the left nasal ala-treated with Mohs by Dr. Rose Posada in April 2014. Superficial BCC of the right shoulder-treated with curettage in April 2014. BCC of the right mid cheek-treated with Mohs by Dr. Sherman Sos Sept 2012. Pigmented nodular BCC the right mandible-treated with Mohs in JAYLYN [id number]. Superficial BCC of the right cheek-treated with Mohs by Dr. Rose Posada in December 2009.     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 Has a home in [place].        Review of Systems:  Gen: Feels well, good sense of health. Past Medical History, Family History, Surgical History, Medications and Allergies reviewed.     Past Medical History:   Diagnosis Date    [facility]     [address] R nasal tip    Diabetes mellitus ([facility] [address]     Fracture cervical vertebra-closed ([facility] [address] 3/16/13     Past Surgical History:   Procedure Laterality Date    COLONOSCOPY N/A 1/22/2021    COLONOSCOPY WITH BIOPSY performed by Nehemias Trent MD at [address]  SURGERY  10/02/2018    right nasal tip    TONSILLECTOMY         Allergies   Allergen Reactions    No Known Allergies      Outpatient Medications Marked as Taking for the 4/13/22 encounter (Office Visit) with Arjun Stack MD Medication Sig Dispense Refill    metoprolol succinate (TOPROL XL) 100 MG extended release tablet Take 100 mg by mouth daily      pantoprazole (PROTONIX) 40 MG tablet Take 40 mg by mouth daily      niacin 500 MG tablet Take 500 mg by mouth every evening      metoprolol succinate (TOPROL XL) 100 MG extended release tablet TAKE 1 TABLET BY MOUTH ONCE DAILY      Biotin 1000 MCG CHEW Take by mouth daily      ascorbic acid (VITAMIN C) 250 MG tablet Take 250 mg by mouth daily      ELIQUIS 5 MG TABS tablet       atorvastatin (LIPITOR) 40 MG tablet Take 1 tablet by mouth once daily 90 tablet 0    JANUMET  MG per tablet TAKE 1 TABLET BY MOUTH TWICE DAILY WITH MEALS 180 tablet 1    losartan (COZAAR) 25 MG tablet Take 1 tablet by mouth once daily 90 tablet 1    glipiZIDE (GLUCOTROL) 10 MG tablet TAKE 1 TABLET BY MOUTH TWICE DAILY BEFORE MEAL(S) 180 tablet 3    JARDIANCE 25 MG tablet Take 1 tablet by mouth once daily 90 tablet 0    mometasone (ELOCON) 0.1 % cream Apply to affected areas of the skin twice daily for up 2 weeks or until improved. For dermatitis. 45 g 2    VITAMIN A PO Take by mouth      GLUCOSAMINE-CHONDROITIN PO Take by mouth      acetaminophen (TYLENOL) 325 MG tablet Take 650 mg by mouth every 6 hours as needed for Pain.  Cholecalciferol (VITAMIN D-3) 5000 UNITS TABS Take 1 tablet by mouth daily.  vitamin E 1000 UNITS capsule Take 1,000 Units by mouth daily. Physical Examination       The following were examined and determined to be normal: Psych/Neuro, Scalp/hair, Conjunctivae/eyelids, Gums/teeth/lips, Neck, Breast/axilla/chest, Abdomen, Back, RUE, RLE, LLE and Nails/digits. The following were examined and determined to be abnormal: Head/face and LUE. Well appearing. 1.  Right temple - 3, frontal scalp - 1: few keratotic pink macules. 2.  Left temple, forehead and upper trunk with multiple round smooth brown macules and patches.      3.  Left distal extensor forearm - 1 cm smooth pink macule. Assessment and Plan     1. Actinic keratosis - few    2 cycles of liquid nitrogen applied to 4 AKs: Right temple - 3, frontal scalp - 1. Patient was educated regarding the potential risks of blister formation and discomfort. Wound care was discussed. 2. Solar lentiginosis     Monitor for change. 3. Neoplasm of uncertain behavior of skin, left forearm - r/o BCC    Discussed possible diagnosis; patient agreeable to biopsy (consent obtained). Risks reviewed including discomfort, bleeding, scar and infection. The area(s) to be biopsied were marked with a surgical pen. Alcohol was used to cleanse the site. Local anesthesia was acheived with 1% lidocaine with epinephrine. Shave biopsy was performed using a razor blade. Hemostasis was achieved with aluminum chloride. The wound(s) were dressed with petrolatum and covered with a bandage. Wound care instructions were reviewed. 1 Specimen (s) sent to pathology. The specimen bottles were appropriately labeled. Return in about 6 months (around 10/13/2022).     --Caroline Dodge MD

## 2022-04-15 LAB — DERMATOLOGY PATHOLOGY REPORT: ABNORMAL

## 2022-04-18 ENCOUNTER — PATIENT MESSAGE (OUTPATIENT)
Dept: DERMATOLOGY | Age: 72
End: 2022-04-18

## 2022-04-29 ENCOUNTER — PROCEDURE VISIT (OUTPATIENT)
Dept: DERMATOLOGY | Age: 72
End: 2022-04-29
Payer: MEDICARE

## 2022-04-29 VITALS — TEMPERATURE: 97.9 F

## 2022-04-29 DIAGNOSIS — C44.619 BASAL CELL CARCINOMA (BCC) OF LEFT FOREARM: Primary | ICD-10-CM

## 2022-04-29 PROCEDURE — 17261 DSTRJ MAL LES T/A/L .6-1.0CM: CPT | Performed by: DERMATOLOGY

## 2022-04-29 RX ORDER — AMIODARONE HYDROCHLORIDE 200 MG/1
200 TABLET ORAL DAILY
COMMUNITY
Start: 2022-04-14 | End: 2022-09-26 | Stop reason: ALTCHOICE

## 2022-04-29 RX ORDER — ZINC GLUCONATE 50 MG
50 TABLET ORAL DAILY
COMMUNITY

## 2022-04-29 NOTE — PROGRESS NOTES
[facility]  Eileen Godinez MD  [phone]      [address]inne Brito [address]  1950    70 y.o. male     Date of Visit: 4/29/2022    Chief Complaint: [facility]    History of Present Illness:    Here today for a BCC on the left forearm. DIAGNOSIS:     LEFT FOREARM-     Basal Cell Carcinoma, nodular and superficial           RESULTS SIGNATURE   Alyssa Gudino MD   Electronic Signature: 15 APR 2022 10:59 AM       Review of Systems:  Gen: Feels well, good sense of health. Past Medical History, Family History, Surgical History, Medications and Allergies reviewed.     Past Medical History:   Diagnosis Date    Cancer [facility])     [address] R nasal tip    Diabetes mellitus ([facility] [address]     Fracture cervical vertebra-closed ([facility] [address] 3/16/13     Past Surgical History:   Procedure Laterality Date    COLONOSCOPY N/A 1/22/2021    COLONOSCOPY WITH BIOPSY performed by Ulyess Gosselin, MD at [address]  SURGERY  10/02/2018    right nasal tip    TONSILLECTOMY         Allergies   Allergen Reactions    No Known Allergies      Outpatient Medications Marked as Taking for the 4/29/22 encounter (Procedure visit) with Kobi Young MD   Medication Sig Dispense Refill    amiodarone (CORDARONE) 200 MG tablet Take 200 mg by mouth daily      zinc gluconate 50 MG tablet Take by mouth daily      pantoprazole (PROTONIX) 40 MG tablet Take 40 mg by mouth daily      niacin 500 MG tablet Take 500 mg by mouth every evening      Biotin 1000 MCG CHEW Take by mouth daily      ascorbic acid (VITAMIN C) 250 MG tablet Take 250 mg by mouth daily      ELIQUIS 5 MG TABS tablet       tadalafil (CIALIS) 5 MG tablet TAKE 1 TABLET BY MOUTH ONCE DAILY AS NEEDED FOR ERECTILE DYSFUNCTION 90 tablet 0    atorvastatin (LIPITOR) 40 MG tablet Take 1 tablet by mouth once daily 90 tablet 0    sildenafil (REVATIO) 20 MG tablet TAKE 4-5 TABLETs BY MOUTH ONCE DAILY AS NEEDED 30 tablet 5    JANUMET  MG per tablet TAKE 1 TABLET BY MOUTH TWICE DAILY WITH MEALS 180 tablet 1    losartan (COZAAR) 25 MG tablet Take 1 tablet by mouth once daily 90 tablet 1    glipiZIDE (GLUCOTROL) 10 MG tablet TAKE 1 TABLET BY MOUTH TWICE DAILY BEFORE MEAL(S) 180 tablet 3    JARDIANCE 25 MG tablet Take 1 tablet by mouth once daily 90 tablet 0    mometasone (ELOCON) 0.1 % cream Apply to affected areas of the skin twice daily for up 2 weeks or until improved. For dermatitis. 45 g 2    VITAMIN A PO Take by mouth      GLUCOSAMINE-CHONDROITIN PO Take by mouth      acetaminophen (TYLENOL) 325 MG tablet Take 650 mg by mouth every 6 hours as needed for Pain.  Cholecalciferol (VITAMIN D-3) 5000 UNITS TABS Take 1 tablet by mouth daily.  vitamin E 1000 UNITS capsule Take 1,000 Units by mouth daily. Physical Examination       Well appearing. 1.  Left distal extensor forearm - 8 mm crusted erythematous macule. Assessment and Plan     1. Nodular and superficial basal cell carcinoma (BCC) of left forearm - 8 mm    The area to be treated with cleansed with alcohol and marked with surgical marking pen. Local anesthesia was acheived with 1% lidocaine with epinephrine. Sharp curettage was performed in multiple directions followed 3 times by electrodessication. Hemostasis was obtained with aluminum chloride. The wound was dressed with petrolatum and a bandage. Patient was educated regarding risks including bleeding, discomfort, and risk of recurrence.           --Dayana Gonzalez MD

## 2022-05-06 DIAGNOSIS — E11.8 TYPE 2 DIABETES MELLITUS WITH COMPLICATION, WITHOUT LONG-TERM CURRENT USE OF INSULIN (HCC): ICD-10-CM

## 2022-05-06 RX ORDER — EMPAGLIFLOZIN 25 MG/1
TABLET, FILM COATED ORAL
Qty: 90 TABLET | Refills: 0 | Status: SHIPPED | OUTPATIENT
Start: 2022-05-06 | End: 2022-08-02

## 2022-05-18 ENCOUNTER — OFFICE VISIT (OUTPATIENT)
Dept: FAMILY MEDICINE CLINIC | Age: 72
End: 2022-05-18
Payer: MEDICARE

## 2022-05-18 VITALS
RESPIRATION RATE: 16 BRPM | OXYGEN SATURATION: 98 % | HEART RATE: 63 BPM | WEIGHT: 203 LBS | HEIGHT: 71 IN | SYSTOLIC BLOOD PRESSURE: 122 MMHG | BODY MASS INDEX: 28.42 KG/M2 | DIASTOLIC BLOOD PRESSURE: 78 MMHG

## 2022-05-18 DIAGNOSIS — E11.8 TYPE 2 DIABETES MELLITUS WITH COMPLICATION, WITHOUT LONG-TERM CURRENT USE OF INSULIN (HCC): Primary | ICD-10-CM

## 2022-05-18 DIAGNOSIS — I48.0 PAROXYSMAL ATRIAL FIBRILLATION (HCC): ICD-10-CM

## 2022-05-18 DIAGNOSIS — R20.2 NUMBNESS AND TINGLING OF BOTH FEET: ICD-10-CM

## 2022-05-18 DIAGNOSIS — R20.0 NUMBNESS AND TINGLING OF BOTH FEET: ICD-10-CM

## 2022-05-18 DIAGNOSIS — R20.0 SADDLE ANESTHESIA: ICD-10-CM

## 2022-05-18 DIAGNOSIS — I47.1 AVNRT (AV NODAL RE-ENTRY TACHYCARDIA) (HCC): ICD-10-CM

## 2022-05-18 PROCEDURE — 4040F PNEUMOC VAC/ADMIN/RCVD: CPT | Performed by: FAMILY MEDICINE

## 2022-05-18 PROCEDURE — 1036F TOBACCO NON-USER: CPT | Performed by: FAMILY MEDICINE

## 2022-05-18 PROCEDURE — 99214 OFFICE O/P EST MOD 30 MIN: CPT | Performed by: FAMILY MEDICINE

## 2022-05-18 PROCEDURE — 2022F DILAT RTA XM EVC RTNOPTHY: CPT | Performed by: FAMILY MEDICINE

## 2022-05-18 PROCEDURE — 3046F HEMOGLOBIN A1C LEVEL >9.0%: CPT | Performed by: FAMILY MEDICINE

## 2022-05-18 PROCEDURE — 1123F ACP DISCUSS/DSCN MKR DOCD: CPT | Performed by: FAMILY MEDICINE

## 2022-05-18 PROCEDURE — G8427 DOCREV CUR MEDS BY ELIG CLIN: HCPCS | Performed by: FAMILY MEDICINE

## 2022-05-18 PROCEDURE — 3017F COLORECTAL CA SCREEN DOC REV: CPT | Performed by: FAMILY MEDICINE

## 2022-05-18 PROCEDURE — G8417 CALC BMI ABV UP PARAM F/U: HCPCS | Performed by: FAMILY MEDICINE

## 2022-05-18 ASSESSMENT — PATIENT HEALTH QUESTIONNAIRE - PHQ9
SUM OF ALL RESPONSES TO PHQ QUESTIONS 1-9: 0
7. TROUBLE CONCENTRATING ON THINGS, SUCH AS READING THE NEWSPAPER OR WATCHING TELEVISION: 0
1. LITTLE INTEREST OR PLEASURE IN DOING THINGS: 0
6. FEELING BAD ABOUT YOURSELF - OR THAT YOU ARE A FAILURE OR HAVE LET YOURSELF OR YOUR FAMILY DOWN: 0
SUM OF ALL RESPONSES TO PHQ QUESTIONS 1-9: 0
5. POOR APPETITE OR OVEREATING: 0
SUM OF ALL RESPONSES TO PHQ QUESTIONS 1-9: 0
3. TROUBLE FALLING OR STAYING ASLEEP: 0
SUM OF ALL RESPONSES TO PHQ QUESTIONS 1-9: 0
SUM OF ALL RESPONSES TO PHQ9 QUESTIONS 1 & 2: 0
2. FEELING DOWN, DEPRESSED OR HOPELESS: 0
9. THOUGHTS THAT YOU WOULD BE BETTER OFF DEAD, OR OF HURTING YOURSELF: 0
8. MOVING OR SPEAKING SO SLOWLY THAT OTHER PEOPLE COULD HAVE NOTICED. OR THE OPPOSITE, BEING SO FIGETY OR RESTLESS THAT YOU HAVE BEEN MOVING AROUND A LOT MORE THAN USUAL: 0
4. FEELING TIRED OR HAVING LITTLE ENERGY: 0
10. IF YOU CHECKED OFF ANY PROBLEMS, HOW DIFFICULT HAVE THESE PROBLEMS MADE IT FOR YOU TO DO YOUR WORK, TAKE CARE OF THINGS AT HOME, OR GET ALONG WITH OTHER PEOPLE: 0

## 2022-05-18 NOTE — PROGRESS NOTES
5/18/2022    This is a 70 y.o. male   Chief Complaint   Patient presents with    Follow-up     x4 months ago pt has had a lot of heart issues, he had a cardioversion, his pulse was in the 120's. that lasted 12 days then he found out he had a fib. he then had an ablasion. he has an abnrt and in 3 months they will do an ablasion for that     Numbness     pt has had numbness in his feet and up into his paraneum      HPI    Diabetes  Lab Results   Component Value Date    LABA1C 8.0 09/22/2021     Lab Results   Component Value Date    .7 06/02/2020   A1c today 6.6  On glipizide, Janumet, Jardiance. Rare hypoglycemia    Cardiac  -Recently had tachycardia with palpitations. Evaluated by cardiology and noted to have paroxysmal A. fib as well as SVT. Underwent ablation. Has AVNRT and is scheduled for another possible ablation. Currently on amiodarone and Eliquis    Numbness  - onset a few days ago bilateral LE in sock-like pattern and also involved in the perineum. The latter is intermittent and chronic for him based on his spinal condition, the former is newer for him  - no incontinence.  Has known spinal disease      Review of Systems     Current Outpatient Medications   Medication Sig Dispense Refill    JARDIANCE 25 MG tablet Take 1 tablet by mouth once daily 90 tablet 0    amiodarone (CORDARONE) 200 MG tablet Take 200 mg by mouth daily      zinc gluconate 50 MG tablet Take by mouth daily      niacin 500 MG tablet Take 500 mg by mouth every evening      Biotin 1000 MCG CHEW Take by mouth daily      ascorbic acid (VITAMIN C) 250 MG tablet Take 250 mg by mouth daily      ELIQUIS 5 MG TABS tablet       tadalafil (CIALIS) 5 MG tablet TAKE 1 TABLET BY MOUTH ONCE DAILY AS NEEDED FOR ERECTILE DYSFUNCTION 90 tablet 0    atorvastatin (LIPITOR) 40 MG tablet Take 1 tablet by mouth once daily 90 tablet 0    sildenafil (REVATIO) 20 MG tablet TAKE 4-5 TABLETs BY MOUTH ONCE DAILY AS NEEDED 30 tablet 5    anesthesia has been intermittent in the past, now with worsening progressive numbness and tingling of both feet. Addendum: Next day, he notes worsening of the numbness and tingling and now difficulty walking. Given his significant past spinal disease issues and new onset of these neurological symptoms, it warrants a stat lumbar MRI to rule out cauda equina syndrome with the progressive saddle anesthesia    Return in about 6 months (around 11/18/2022).

## 2022-05-19 ENCOUNTER — HOSPITAL ENCOUNTER (OUTPATIENT)
Dept: MRI IMAGING | Age: 72
Discharge: HOME | End: 2022-05-19
Payer: MEDICARE

## 2022-05-19 ENCOUNTER — TELEPHONE (OUTPATIENT)
Dept: FAMILY MEDICINE CLINIC | Age: 72
End: 2022-05-19

## 2022-05-19 DIAGNOSIS — R20.0 NUMBNESS AND TINGLING OF BOTH LEGS BELOW KNEES: ICD-10-CM

## 2022-05-19 DIAGNOSIS — R20.0 SADDLE ANESTHESIA: Primary | ICD-10-CM

## 2022-05-19 DIAGNOSIS — R20.2 NUMBNESS AND TINGLING OF BOTH LEGS BELOW KNEES: ICD-10-CM

## 2022-05-19 DIAGNOSIS — R20.0 SADDLE ANESTHESIA: ICD-10-CM

## 2022-05-19 PROCEDURE — 72148 MRI LUMBAR SPINE W/O DYE: CPT

## 2022-05-19 NOTE — TELEPHONE ENCOUNTER
SUBJECTIVE:                                                    Jorge Lou is a 67 year old male who presents to clinic today for the following health issues:    Hyperlipidemia Follow-Up      Rate your low fat/cholesterol diet?: good    Taking statin?  Yes, no muscle aches from statin    Other lipid medications/supplements?:  none      Amount of exercise or physical activity: 3 or more days/week for an average of greater than 60 minutes    Problems taking medications regularly: No    Medication side effects: none    Diet: regular (no restrictions)        Problem list and histories reviewed & adjusted, as indicated.  Additional history:     Patient Active Problem List   Diagnosis     CARDIOVASCULAR SCREENING; LDL GOAL LESS THAN 160     Advanced directives, counseling/discussion     Erectile dysfunction     Health Care Home     AR (allergic rhinitis)     Polyp of colon, adenomatous     Other specified transient cerebral ischemias     Past Surgical History:   Procedure Laterality Date     BIOPSY  April 2015    Polyps found during colonoscopy     COLONOSCOPY  2/24/2005     CYSTOSCOPY  10/11/2011    Procedure:CYSTOSCOPY; Cystoscopy; Surgeon:PIETRO CALDWELL; Location:[place] OR     Cystourethroscopy  10/2000     Fistula-in-ano Repair  1992     HERNIA REPAIR  10-15 years ago    R/L Inguinal hernias repaired laproscopically     Laparoscopic recurrent right hernioplasty  10/2003     Laparoscopic right hernioplasty  12/1995     URETEROLITHOTOMY  1998     VASCULAR SURGERY  [id number]    Vein therapy to close veins in right leg       Social History     Tobacco Use     Smoking status: Never Smoker     Smokeless tobacco: Never Used   Substance Use Topics     Alcohol use: Yes     Comment: Socially on occasion     Family History   Problem Relation Age of Onset     Arthritis Mother      Other Cancer Brother         Lung, Liver, Brain     Osteoporosis Sister          Current Outpatient Medications   Medication Sig Dispense Refill      Spoke with pt  Spoke with Neurosurgery  Has appt tomorrow with Dr. Emmie Villalobos "aspirin 81 MG tablet Take 1 tablet (81 mg) by mouth daily 30 tablet 0     atorvastatin (LIPITOR) 20 MG tablet Take 1 tablet (20 mg) by mouth daily 90 tablet 2     Chlordiazepoxide-Amitriptyline 5-12.5 MG TABS Take 1 tablet by mouth daily as needed (prostate pain) 90 tablet 0     cyanocobalamin (VITAMIN  B-12) 1000 MCG tablet Take 5,000 mcg by mouth daily       cyclobenzaprine (FLEXERIL) 10 MG tablet Take 1 tablet (10 mg) by mouth 3 times daily as needed for muscle spasms 42 tablet 1     cycloSPORINE (RESTASIS) 0.05 % ophthalmic emulsion Place 1 drop into both eyes 2 times daily       DHEA 50 MG PO TABS 1 daily       diltiazem 2% in PLO cream, FV COMPOUNDED, 2% GEL To anal opening three times daily.  Use a pea-sized amount.  Store at room temperature. 60 g 0     FIBER PO POWD 3 tsp daily       Loteprednol Etabonate (LOTEMAX OP) Apply 1 drop to eye 2 times daily Reported on 4/12/2017       MULTIVITAMINS OR TABS Reported on 4/12/2017 100 3     SAW PALMETTO COMPLEX PO 1 cap 160mg       SYSTANE ULTRA OP eye drops daily-PRN       TRIPLE OMEGA-3-6-9 OR CAPS 1 cap       VITAMIN B-1 100 MG PO TABS 1 daily       VITAMIN D, CHOLECALCIFEROL, PO Take 5,000 Units by mouth daily       Allergies   Allergen Reactions     Seasonal Allergies      Sulfa Drugs        ROS:  Constitutional, HEENT, cardiovascular, pulmonary, gi and gu systems are negative, except as otherwise noted.    OBJECTIVE:                                                    /71 (BP Location: Right arm, Patient Position: Sitting, Cuff Size: Adult Large)   Pulse 85   Temp 98  F (36.7  C) (Tympanic)   Ht 1.753 m (5' 9\")   Wt 90.9 kg (200 lb 8 oz)   BMI 29.61 kg/m   Body mass index is 29.61 kg/m .   GENERAL: healthy, alert, well nourished, well hydrated, no distress  HENT: ear canals- normal; TMs- normal; Nose- normal; Mouth- no ulcers, no lesions  RESP: lungs clear to auscultation - no rales, no rhonchi, no wheezes  CV: regular rates and rhythm, normal S1 S2, " no S3 or S4 and no murmur, no click or rub -  ABDOMEN: soft, no tenderness, no  hepatosplenomegaly, no masses, normal bowel sounds  Back:  Straight leg raise neg bilat.  Has tightness and tenderness in perilumbar muscles more on rt than left.       ASSESSMENT/PLAN:                                                      (M54.5) Midline low back pain without sciatica, unspecified chronicity*  Plan: cyclobenzaprine (FLEXERIL) 10 MG tablet            (N41.0) Prostatitis, acute on chronic  Plan: Chlordiazepoxide-Amitriptyline 5-12.5 MG TABS                 reports that  has never smoked. he has never used smokeless tobacco.    [facility]

## 2022-05-19 NOTE — TELEPHONE ENCOUNTER
Called and spoke with pt   Concerning progression of symptoms, saddle anesthesia, worsening lower extremity neuropathy, having difficulty with walking.   Stat MRI ordered

## 2022-05-19 NOTE — TELEPHONE ENCOUNTER
Pt was seen for a 6 mo fu 05/18/2022  Pt wanted to update Dr Day     Pt foot numbness is worse his groin from back to the front has no feeling   Pt got a B  12 injection yesterday pt is having trouble walking     Pt wants to know what he should do?   Call cell [phone]

## 2022-05-24 ENCOUNTER — HOSPITAL ENCOUNTER (INPATIENT)
Age: 72
LOS: 2 days | Discharge: HOME | DRG: 519 | End: 2022-05-26
Attending: NEUROLOGICAL SURGERY | Admitting: NEUROLOGICAL SURGERY
Payer: MEDICARE

## 2022-05-24 DIAGNOSIS — M51.16 LUMBAR DISC DISEASE WITH RADICULOPATHY: ICD-10-CM

## 2022-05-24 DIAGNOSIS — R20.2 NUMBNESS AND TINGLING: Primary | ICD-10-CM

## 2022-05-24 DIAGNOSIS — G83.4 CAUDA EQUINA SYNDROME NOT AFFECTING BLADDER (HCC): ICD-10-CM

## 2022-05-24 DIAGNOSIS — R20.0 NUMBNESS AND TINGLING: Primary | ICD-10-CM

## 2022-05-24 DIAGNOSIS — R20.2 PARESTHESIAS: ICD-10-CM

## 2022-05-24 LAB
A/G RATIO: 1.4 (ref 1.1–2.2)
ABO/RH: NORMAL
ALBUMIN SERPL-MCNC: 4.3 G/DL (ref 3.4–5)
ALP BLD-CCNC: 57 U/L (ref 40–129)
ALT SERPL-CCNC: 21 U/L (ref 10–40)
ANION GAP SERPL CALCULATED.3IONS-SCNC: 17 MMOL/L (ref 3–16)
ANTIBODY SCREEN: NORMAL
APTT: 29.2 SEC (ref 26.2–38.6)
AST SERPL-CCNC: 19 U/L (ref 15–37)
BACTERIA: ABNORMAL /HPF
BASOPHILS ABSOLUTE: 0 K/UL (ref 0–0.2)
BASOPHILS RELATIVE PERCENT: 0.3 %
BILIRUB SERPL-MCNC: 0.4 MG/DL (ref 0–1)
BILIRUBIN URINE: NEGATIVE
BLOOD, URINE: NEGATIVE
BUN BLDV-MCNC: 37 MG/DL (ref 7–20)
C-REACTIVE PROTEIN: <3 MG/L (ref 0–5.1)
CALCIUM SERPL-MCNC: 9.5 MG/DL (ref 8.3–10.6)
CHLORIDE BLD-SCNC: 105 MMOL/L (ref 99–110)
CLARITY: ABNORMAL
CO2: 16 MMOL/L (ref 21–32)
COLOR: YELLOW
CREAT SERPL-MCNC: 1.2 MG/DL (ref 0.8–1.3)
EKG ATRIAL RATE: 61 BPM
EKG DIAGNOSIS: NORMAL
EKG P AXIS: 51 DEGREES
EKG P-R INTERVAL: 220 MS
EKG Q-T INTERVAL: 522 MS
EKG QRS DURATION: 138 MS
EKG QTC CALCULATION (BAZETT): 525 MS
EKG R AXIS: -59 DEGREES
EKG T AXIS: -23 DEGREES
EKG VENTRICULAR RATE: 61 BPM
EOSINOPHILS ABSOLUTE: 0 K/UL (ref 0–0.6)
EOSINOPHILS RELATIVE PERCENT: 0 %
EPITHELIAL CELLS, UA: 0 /HPF (ref 0–5)
GFR AFRICAN AMERICAN: >60
GFR NON-AFRICAN AMERICAN: 60
GLUCOSE BLD-MCNC: 148 MG/DL (ref 70–99)
GLUCOSE URINE: >=1000 MG/DL
HCT VFR BLD CALC: 43.9 % (ref 40.5–52.5)
HEMOGLOBIN: 14.5 G/DL (ref 13.5–17.5)
HYALINE CASTS: 0 /LPF (ref 0–8)
INR BLD: 1.23 (ref 0.88–1.12)
KETONES, URINE: NEGATIVE MG/DL
LEUKOCYTE ESTERASE, URINE: ABNORMAL
LIPASE: 82 U/L (ref 13–60)
LYMPHOCYTES ABSOLUTE: 1.1 K/UL (ref 1–5.1)
LYMPHOCYTES RELATIVE PERCENT: 9.9 %
MCH RBC QN AUTO: 29.4 PG (ref 26–34)
MCHC RBC AUTO-ENTMCNC: 32.9 G/DL (ref 31–36)
MCV RBC AUTO: 89.4 FL (ref 80–100)
MICROSCOPIC EXAMINATION: YES
MONOCYTES ABSOLUTE: 1.1 K/UL (ref 0–1.3)
MONOCYTES RELATIVE PERCENT: 10 %
NEUTROPHILS ABSOLUTE: 8.6 K/UL (ref 1.7–7.7)
NEUTROPHILS RELATIVE PERCENT: 79.8 %
NITRITE, URINE: NEGATIVE
PDW BLD-RTO: 17.2 % (ref 12.4–15.4)
PH UA: 5.5 (ref 5–8)
PLATELET # BLD: 163 K/UL (ref 135–450)
PMV BLD AUTO: 8.7 FL (ref 5–10.5)
POTASSIUM REFLEX MAGNESIUM: 4 MMOL/L (ref 3.5–5.1)
PROTEIN UA: NEGATIVE MG/DL
PROTHROMBIN TIME: 14 SEC (ref 9.9–12.7)
RBC # BLD: 4.92 M/UL (ref 4.2–5.9)
RBC UA: 1 /HPF (ref 0–4)
SEDIMENTATION RATE, ERYTHROCYTE: 3 MM/HR (ref 0–20)
SODIUM BLD-SCNC: 138 MMOL/L (ref 136–145)
SPECIFIC GRAVITY UA: 1.02 (ref 1–1.03)
TOTAL PROTEIN: 7.3 G/DL (ref 6.4–8.2)
URINE REFLEX TO CULTURE: YES
URINE TYPE: ABNORMAL
UROBILINOGEN, URINE: 0.2 E.U./DL
WBC # BLD: 10.8 K/UL (ref 4–11)
WBC UA: 31 /HPF (ref 0–5)

## 2022-05-24 PROCEDURE — 85610 PROTHROMBIN TIME: CPT

## 2022-05-24 PROCEDURE — 80053 COMPREHEN METABOLIC PANEL: CPT

## 2022-05-24 PROCEDURE — 99285 EMERGENCY DEPT VISIT HI MDM: CPT

## 2022-05-24 PROCEDURE — 94760 N-INVAS EAR/PLS OXIMETRY 1: CPT

## 2022-05-24 PROCEDURE — 86900 BLOOD TYPING SEROLOGIC ABO: CPT

## 2022-05-24 PROCEDURE — 93010 ELECTROCARDIOGRAM REPORT: CPT | Performed by: INTERNAL MEDICINE

## 2022-05-24 PROCEDURE — 85730 THROMBOPLASTIN TIME PARTIAL: CPT

## 2022-05-24 PROCEDURE — 1200000000 HC SEMI PRIVATE

## 2022-05-24 PROCEDURE — 85652 RBC SED RATE AUTOMATED: CPT

## 2022-05-24 PROCEDURE — 87086 URINE CULTURE/COLONY COUNT: CPT

## 2022-05-24 PROCEDURE — 86901 BLOOD TYPING SEROLOGIC RH(D): CPT

## 2022-05-24 PROCEDURE — 86140 C-REACTIVE PROTEIN: CPT

## 2022-05-24 PROCEDURE — 81001 URINALYSIS AUTO W/SCOPE: CPT

## 2022-05-24 PROCEDURE — 2580000003 HC RX 258: Performed by: PHYSICIAN ASSISTANT

## 2022-05-24 PROCEDURE — 86850 RBC ANTIBODY SCREEN: CPT

## 2022-05-24 PROCEDURE — 36415 COLL VENOUS BLD VENIPUNCTURE: CPT

## 2022-05-24 PROCEDURE — 93005 ELECTROCARDIOGRAM TRACING: CPT | Performed by: PHYSICIAN ASSISTANT

## 2022-05-24 PROCEDURE — 85025 COMPLETE CBC W/AUTO DIFF WBC: CPT

## 2022-05-24 PROCEDURE — 83690 ASSAY OF LIPASE: CPT

## 2022-05-24 PROCEDURE — 6370000000 HC RX 637 (ALT 250 FOR IP): Performed by: NEUROLOGICAL SURGERY

## 2022-05-24 RX ORDER — BIOTIN 1000 MCG
1000 TABLET,CHEWABLE ORAL DAILY
Status: DISCONTINUED | OUTPATIENT
Start: 2022-05-24 | End: 2022-05-24

## 2022-05-24 RX ORDER — NIACIN 500 MG/1
500 TABLET, EXTENDED RELEASE ORAL NIGHTLY
Status: DISCONTINUED | OUTPATIENT
Start: 2022-05-24 | End: 2022-05-26 | Stop reason: HOSPADM

## 2022-05-24 RX ORDER — AMIODARONE HYDROCHLORIDE 200 MG/1
200 TABLET ORAL DAILY
Status: DISCONTINUED | OUTPATIENT
Start: 2022-05-24 | End: 2022-05-26 | Stop reason: HOSPADM

## 2022-05-24 RX ORDER — DEXAMETHASONE 4 MG/1
4 TABLET ORAL 3 TIMES DAILY
Status: ON HOLD | COMMUNITY
End: 2022-05-26 | Stop reason: HOSPADM

## 2022-05-24 RX ORDER — PANTOPRAZOLE SODIUM 40 MG/1
40 TABLET, DELAYED RELEASE ORAL 2 TIMES DAILY
Status: DISCONTINUED | OUTPATIENT
Start: 2022-05-24 | End: 2022-05-26 | Stop reason: HOSPADM

## 2022-05-24 RX ORDER — ALOGLIPTIN 25 MG/1
25 TABLET, FILM COATED ORAL
Status: DISCONTINUED | OUTPATIENT
Start: 2022-05-25 | End: 2022-05-26 | Stop reason: HOSPADM

## 2022-05-24 RX ORDER — GLIPIZIDE 5 MG/1
10 TABLET ORAL
Status: DISCONTINUED | OUTPATIENT
Start: 2022-05-24 | End: 2022-05-26 | Stop reason: HOSPADM

## 2022-05-24 RX ORDER — LANOLIN ALCOHOL/MO/W.PET/CERES
9 CREAM (GRAM) TOPICAL NIGHTLY PRN
Status: DISCONTINUED | OUTPATIENT
Start: 2022-05-24 | End: 2022-05-26 | Stop reason: HOSPADM

## 2022-05-24 RX ORDER — SODIUM CHLORIDE 9 MG/ML
INJECTION, SOLUTION INTRAVENOUS PRN
Status: DISCONTINUED | OUTPATIENT
Start: 2022-05-24 | End: 2022-05-25 | Stop reason: HOSPADM

## 2022-05-24 RX ORDER — LOSARTAN POTASSIUM 25 MG/1
25 TABLET ORAL DAILY
Status: DISCONTINUED | OUTPATIENT
Start: 2022-05-24 | End: 2022-05-26 | Stop reason: HOSPADM

## 2022-05-24 RX ORDER — SODIUM CHLORIDE 0.9 % (FLUSH) 0.9 %
5-40 SYRINGE (ML) INJECTION PRN
Status: DISCONTINUED | OUTPATIENT
Start: 2022-05-24 | End: 2022-05-25 | Stop reason: HOSPADM

## 2022-05-24 RX ORDER — 0.9 % SODIUM CHLORIDE 0.9 %
500 INTRAVENOUS SOLUTION INTRAVENOUS ONCE
Status: DISCONTINUED | OUTPATIENT
Start: 2022-05-24 | End: 2022-05-24

## 2022-05-24 RX ORDER — ATORVASTATIN CALCIUM 40 MG/1
40 TABLET, FILM COATED ORAL NIGHTLY
Status: DISCONTINUED | OUTPATIENT
Start: 2022-05-24 | End: 2022-05-26 | Stop reason: HOSPADM

## 2022-05-24 RX ORDER — SODIUM CHLORIDE 0.9 % (FLUSH) 0.9 %
5-40 SYRINGE (ML) INJECTION EVERY 12 HOURS SCHEDULED
Status: DISCONTINUED | OUTPATIENT
Start: 2022-05-24 | End: 2022-05-25 | Stop reason: HOSPADM

## 2022-05-24 RX ORDER — 0.9 % SODIUM CHLORIDE 0.9 %
1000 INTRAVENOUS SOLUTION INTRAVENOUS ONCE
Status: COMPLETED | OUTPATIENT
Start: 2022-05-24 | End: 2022-05-24

## 2022-05-24 RX ADMIN — Medication 9 MG: at 23:04

## 2022-05-24 RX ADMIN — NIACIN 500 MG: 500 TABLET, EXTENDED RELEASE ORAL at 20:39

## 2022-05-24 RX ADMIN — AMIODARONE HYDROCHLORIDE 200 MG: 200 TABLET ORAL at 16:07

## 2022-05-24 RX ADMIN — LOSARTAN POTASSIUM 25 MG: 25 TABLET, FILM COATED ORAL at 16:07

## 2022-05-24 RX ADMIN — PANTOPRAZOLE SODIUM 40 MG: 40 TABLET, DELAYED RELEASE ORAL at 20:39

## 2022-05-24 RX ADMIN — METFORMIN HYDROCHLORIDE 1000 MG: 500 TABLET ORAL at 16:14

## 2022-05-24 RX ADMIN — SODIUM CHLORIDE 1000 ML: 9 INJECTION, SOLUTION INTRAVENOUS at 13:31

## 2022-05-24 RX ADMIN — ATORVASTATIN CALCIUM 40 MG: 40 TABLET, FILM COATED ORAL at 20:39

## 2022-05-24 RX ADMIN — GLIPIZIDE 10 MG: 5 TABLET ORAL at 16:07

## 2022-05-24 ASSESSMENT — PAIN SCALES - GENERAL
PAINLEVEL_OUTOF10: 0

## 2022-05-24 ASSESSMENT — PAIN - FUNCTIONAL ASSESSMENT: PAIN_FUNCTIONAL_ASSESSMENT: NONE - DENIES PAIN

## 2022-05-24 NOTE — ED PROVIDER NOTES
[address] [facility]        Pt Name: Mark Roy Dr.  MRN: [id number]  [place] 1950  Date of evaluation: 5/24/2022  Provider: Sachi Carlton PA-C  PCP: Heather Abbasi MD  Note Started: 1:03 PM EDT      JOHNATHAN. I have evaluated this patient. My supervising physician was available for consultation. Triage CHIEF COMPLAINT       Chief Complaint   Patient presents with    Numbness     bilateral legs and feet for the last week. HISTORY OF PRESENT ILLNESS   (Location/Symptom, Timing/Onset, Context/Setting, Quality, Duration, Modifying Factors, Severity)  Note limiting factors. Chief Complaint: Indicating that he has numbness and tingling in the lower extremities that is worsening over the last week, known disc disease in the lumbar region with surgery scheduled for tomorrow, and decreasing sensation in the anus to penis saddle region with no incontinence or retention of bowel or bladder. Mark Roy Dr. is a 70 y.o. male who presents indicates that he was told by his neurosurgeon to come on to the ER to be evaluated and to expect to be admitted for previously scheduled laminectomy L3-4 and 5 that is due to be tomorrow. Patient states that the symptoms really began last week and he had an outpatient MRI that was emergently ordered and read which then led to the plan for surgery tomorrow. Also he had last Thursday numbness and tingling in the feet and socks region that seems almost to be a hypersensitivity according to patient, feeling of weakness or foot drop in the right foot that he is noticing. Since then the numbness and tingling seems to be extending up to the areas just below the knees bilaterally and that he has been urges to go to the bathroom and passed stool, he goes to the restroom, however does not actually feel the stool passing.   He states that he has a decreased sensation in anus to penis area but is not having any incontinence or retention of bowel or bladder. No abdominal pain. No complaint of pain at all at this time but rather a concern for the neurologic changes. Nursing Notes were all reviewed and agreed with or any disagreements were addressed in the HPI. REVIEW OF SYSTEMS    (2-9 systems for level 4, 10 or more for level 5)     Review of Systems  Positives history as above with no fevers or chills cough or congestion no acute fall contusion or twisting injury or known overuse injury, no headache vision change neck or stiffness shortness of breath or chest pain or palpitations. No abdominal pain or difficulty eating or drinking. Patient indicates that he has ongoing bad back but no acute change in pain. Positive for the neuro and extremity changes as above no rash. PAST MEDICAL HISTORY     Past Medical History:   Diagnosis Date    Cancer [facility])     [address] nasal tip    Diabetes mellitus ([facility] [address] [facility]     Fracture cervical vertebra-closed ([facility] [address] 3/16/13       SURGICAL HISTORY     Past Surgical History:   Procedure Laterality Date    COLONOSCOPY N/A 1/22/2021    COLONOSCOPY WITH BIOPSY performed by Cornell Arellano MD at [facility]  10/02/2018    right nasal tip    TONSILLECTOMY         CURRENTMEDICATIONS       Previous Medications    ACETAMINOPHEN (TYLENOL) 325 MG TABLET    Take 650 mg by mouth every 6 hours as needed for Pain. AMIODARONE (CORDARONE) 200 MG TABLET    Take 200 mg by mouth daily    ASCORBIC ACID (VITAMIN C) 250 MG TABLET    Take 250 mg by mouth daily    ATORVASTATIN (LIPITOR) 40 MG TABLET    Take 1 tablet by mouth once daily    BIOTIN 1000 MCG CHEW    Take by mouth daily    CHOLECALCIFEROL (VITAMIN D-3) 5000 UNITS TABS    Take 1 tablet by mouth daily.     ELIQUIS 5 MG TABS TABLET        GLIPIZIDE (GLUCOTROL) 10 MG TABLET    TAKE 1 TABLET BY MOUTH TWICE DAILY BEFORE MEAL(S)    GLUCOSAMINE-CHONDROITIN PO    Take by mouth    JANUMET  MG PER TABLET    TAKE 1 TABLET BY MOUTH TWICE DAILY WITH MEALS    JARDIANCE 25 MG TABLET    Take 1 tablet by mouth once daily    LOSARTAN (COZAAR) 25 MG TABLET    Take 1 tablet by mouth once daily    MOMETASONE (ELOCON) 0.1 % CREAM    Apply to affected areas of the skin twice daily for up 2 weeks or until improved. For dermatitis. NIACIN 500 MG TABLET    Take 500 mg by mouth every evening    PANTOPRAZOLE (PROTONIX) 40 MG TABLET    Take 40 mg by mouth daily    SILDENAFIL (REVATIO) 20 MG TABLET    TAKE 4-5 TABLETs BY MOUTH ONCE DAILY AS NEEDED    TADALAFIL (CIALIS) 5 MG TABLET    TAKE 1 TABLET BY MOUTH ONCE DAILY AS NEEDED FOR ERECTILE DYSFUNCTION    VITAMIN A PO    Take by mouth    VITAMIN E 1000 UNITS CAPSULE    Take 1,000 Units by mouth daily.     ZINC GLUCONATE 50 MG TABLET    Take by mouth daily       ALLERGIES     No known allergies    FAMILYHISTORY       Family History   Problem Relation Age of Onset    Alzheimer's Disease Mother     Cancer Father     Diabetes Father         SOCIAL HISTORY       Social History     Socioeconomic History    Marital status:      Spouse name: None    Number of children: None    Years of education: None    Highest education level: None   Occupational History    Occupation: Chiropractor   Tobacco Use    Smoking status: Never Smoker    Smokeless tobacco: Never Used   Vaping Use    Vaping Use: Never used   Substance and Sexual Activity    Alcohol use: Yes     Comment: occasionally    Drug use: No    Sexual activity: Yes     Partners: Female   Other Topics Concern    None   Social History Narrative    Works as a Chiropractor in an [address]  Strain:     Difficulty of Paying Living Expenses: Not on file   Food Insecurity:    [address] in the Last Year: Not on file    [address] in the Last Year: Not on file   Transportation Needs:     Lack of Transportation (Medical): Not on file    Lack of Transportation (Non-Medical): Not on file   Physical Activity:     Days of Exercise per Week: Not on file    Minutes of Exercise per Session: Not on file   Stress:     Feeling of Stress : Not on file   Social Connections:     Frequency of Communication with Friends and Family: Not on file    Frequency of Social Gatherings with Friends and Family: Not on file    Attends Anglican Services: Not on file    Active Member of [address] [place] [address] [employer] or Organizations: Not on file    Attends Club or Organization Meetings: Not on file    Marital Status: Not on file   Intimate Partner Violence:     Fear of Current or Ex-Partner: Not on file    Emotionally Abused: Not on file    Physically Abused: Not on file    Sexually Abused: Not on file   Housing Stability:     Unable to Pay for Housing in the Last Year: Not on file    Number of [place] in the Last Year: Not on file    Unstable Housing in the Last Year: Not on file       SCREENINGS    Solomon Coma Scale  Eye Opening: Spontaneous  Best Verbal Response: Oriented  Best Motor Response: Obeys commands  Solomon Coma Scale Score: 15        PHYSICAL EXAM    (up to 7 for level 4, 8 or more for level 5)     ED Triage Vitals   BP Temp Temp Source Pulse Resp SpO2 Height Weight   05/24/22 1013 05/24/22 1013 05/24/22 1013 05/24/22 1013 05/24/22 1013 05/24/22 1013 05/24/22 1125 05/24/22 1125   (!) 173/89 92 °F (33.3 °C) Oral 69 16 97 % 5' 11\" (1.803 m) 205 lb 11 oz (93.3 kg)       Physical Exam  Vitals and nursing note reviewed. Constitutional:       Appearance: Normal appearance. He is not toxic-appearing or diaphoretic. HENT:      Head: Normocephalic and atraumatic. Right Ear: External ear normal.      Left Ear: External ear normal.      Nose: Nose normal.      Mouth/Throat:      Mouth: Mucous membranes are moist.      Pharynx: No posterior oropharyngeal erythema. Eyes:      General:         Right eye: No discharge.          Left eye: No AUTO DIFFERENTIAL - Abnormal; Notable for the following components:       Result Value    RDW 17.2 (*)     Neutrophils Absolute 8.6 (*)     All other components within normal limits   COMPREHENSIVE METABOLIC PANEL W/ REFLEX TO MG FOR LOW K - Abnormal; Notable for the following components:    CO2 16 (*)     Anion Gap 17 (*)     Glucose 148 (*)     BUN 37 (*)     GFR Non- 60 (*)     All other components within normal limits   LIPASE - Abnormal; Notable for the following components:    Lipase 82.0 (*)     All other components within normal limits   C-REACTIVE PROTEIN   SEDIMENTATION RATE       When ordered, only abnormal lab results are displayed. All other labs were within normal range or not returned as of this dictation. EKG: When ordered, EKG's are interpreted by the Emergency Department Physician in the absence of a cardiologist.  Please see their note for interpretation of EKG. RADIOLOGY:   Non-plain film images such as CT, Ultrasound and MRI are read by the radiologist. Plain radiographic images are visualized andpreliminarily interpreted by the  ED Provider with the below findings:        Interpretation perthe Radiologist below, if available at the time of this note:    No orders to display     No results found.       PROCEDURES   Unless otherwise noted below, none     Procedures    CRITICAL CARE TIME   N/A    CONSULTS:  IP CONSULT TO NEUROSURGERY      EMERGENCY DEPARTMENT COURSE and DIFFERENTIAL DIAGNOSIS/MDM:   Vitals:    Vitals:    05/24/22 1013 05/24/22 1110 05/24/22 1125 05/24/22 1155   BP: (!) 173/89 (!) 169/89 (!) 157/92 (!) 153/84   Pulse: 69 66 64 63   Resp: 16 17 9 12   Temp: 92 °F (33.3 °C)  98.7 °F (37.1 °C)    TempSrc: Oral  Oral    SpO2: 97% 100% 100% 99%   Weight:   205 lb 11 oz (93.3 kg)    Height:   5' 11\" (1.803 m)        Patient was given thefollowing medications:  Medications   0.9 % sodium chloride bolus (has no administration in time range)         Is this patient to be included in the SEP-1 Core Measure due to severe sepsis or septic shock? No   Exclusion criteria - the patient is NOT to be included for SEP-1 Core Measure due to: Infection is not suspected    This patient presents as above and evaluation and treatment is begun. He indicates that he came in because his neurosurgeon recommended that that was the course of care recommended. He does have the acute onset of saddle numbness and tingling and decreased sensation in that area over the last 5 to 6 days as well as the decreased sensation in the right and left lower extremities as above that was about sock-high last Thursday, now as high as the knees. Electronic medical record does have a note stating the patient was to be admitted today for neurosurgery tomorrow for decompressive laminectomy of L3-4 and 5. Patient indicates that is why he came to the ER. He did have MRI last week which showed new nerve impingement in the low spine area from disc issues. Labs today show moderate increase in BUN to 37 and some IV fluids are begun here. Consultation to neurosurgery placed at this time. In consultation with Dr. Iris Gan, he agrees that his neurosurgery group will admit the patient directly and that our nursing staff is to call for orders. He prefers the patient be admitted under the third floor. Patient is in fair condition at this time. FINAL IMPRESSION      1. Numbness and tingling    2. Paresthesias    3. Lumbar disc disease with radiculopathy          DISPOSITION/PLAN   DISPOSITION Decision To Admit 05/24/2022 01:01:20 PM      PATIENT REFERREDTO:  No follow-up provider specified.     DISCHARGE MEDICATIONS:  New Prescriptions    No medications on file       DISCONTINUED MEDICATIONS:  Discontinued Medications    No medications on file              (Please note that portions ofthis note were completed with a voice recognition program.  Efforts were made to edit the dictations but occasionally words are mis-transcribed.)    Zac Smith PA-C (electronically signed)             Zac Smith PA-C  05/24/22 [id number]

## 2022-05-24 NOTE — ED NOTES
[facility] Patient to be transported to Room 3122 via stretcher by transport tech. Patient transported with bedside cardiac monitor and with IV medications infusing. IV site clean, dry, and intact. MEWS score and pain assessed and documented. Updated patient and family on plan of care.      Ventura San RN  05/24/22 1974

## 2022-05-24 NOTE — PLAN OF CARE
Problem: Discharge Planning  Goal: Discharge to home or other facility with appropriate resources  Outcome: Progressing  Flowsheets (Taken 5/24/2022 1518)  Discharge to home or other facility with appropriate resources:   Identify barriers to discharge with patient and caregiver   Identify discharge learning needs (meds, wound care, etc)   Refer to discharge planning if patient needs post-hospital services based on physician order or complex needs related to functional status, cognitive ability or social support system   Arrange for needed discharge resources and transportation as appropriate   Arrange for interpreters to assist at discharge as needed  Note: Patient will discharge to home or other facility with appropriate resources. Will monitor and assess. Problem: Pain  Goal: Verbalizes/displays adequate comfort level or baseline comfort level  Outcome: Progressing  Note: Patient verbalizes/displays adequate comfort level or baseline comfort level. Will monitor and assess. Problem: Skin/Tissue Integrity  Goal: Absence of new skin breakdown  Description: 1. Monitor for areas of redness and/or skin breakdown  2. Assess vascular access sites hourly  3. Every 4-6 hours minimum:  Change oxygen saturation probe site  4. Every 4-6 hours:  If on nasal continuous positive airway pressure, respiratory therapy assess nares and determine need for appliance change or resting period. Outcome: Progressing  Note: Patient will remain free from new skin breakdown. Precautions in place. Will monitor and assess. Problem: Safety - Adult  Goal: Free from fall injury  Outcome: Progressing  Note: Fall risk assessment completed. Fall precautions in place. Call light within reach. Pt educated on calling for assistance before getting up. Walkway free of clutter. Will continue to monitor.       Problem: ABCDS Injury Assessment  Goal: Absence of physical injury  Outcome: Progressing  Note: Patient will remain free from physical injury. Safety precautions are in place. Will continue to monitor and assess.

## 2022-05-24 NOTE — ED TRIAGE NOTES
Arrived via private car for complaints of numbness in bilateral feet and legs that started last Tuesday evening. He also has numbness from his anus to the tip of the penis. He states the feeling is like the feet and legs are sleeping, and same feeling in the groin.

## 2022-05-24 NOTE — PROGRESS NOTES
Medication Reconciliation    List of medications patient is currently taking is complete. Source of information: 1. Conversation with patient at bedside                                      2. EPIC records      Allergies  No known allergies     Notes regarding home medications:   1. Patient did not take any of his home medications prior to arrival to the emergency department today. 2. Patient's Eliquis has been on hold since Friday for surgery. 3. Patient no longer takes Metoprolol.     Jordana Morris RPH, PharmD, BCPS  5/24/2022 1:32 PM

## 2022-05-24 NOTE — PROGRESS NOTES
Patient is sitting up in the chair and is alert and oriented x4. Patient denies any pain or discomfort at this time. Numbness and tingling to BLE. 1L IV fluid bolus still infusing from ED. Patient is a stand by assist and calls out appropriately. Urine sample sent. Consent form is signed and in patients chart. Patient aware of NPO at midnight for surgery tomorrow morning at 7:30. Wife told it was okay for her to come to the floor at 6 tomorrow morning to go down to pre-op with patient. Patient tolerating PO intake. Patient on RA. VSS. Medication given, head to toe assessment is complete, and 4 eyes assessment is done. All needs are met and safety precautions remain in place. Will continue to monitor and assess.   Electronically signed by Carlee Cuellar RN on 5/24/2022 at 5:33 PM

## 2022-05-24 NOTE — CONSULTS
Patient seen and examined in ED  Cauda equina syndrome without incontinence  Admit to hospital, laminectomy in AM  Answered patient questions, in agreement.

## 2022-05-24 NOTE — ED PROVIDER NOTES
Pt Name: Maura Young Dr.  MRN: [id number]  [place] 1950  Date of evaluation: 5/24/2022    EKG Interpretation    The purpose of this note is for preliminary EKG interpretation only. This patient was seen independently by the mid-level provider and was not seen by this provider. EKG visualized preliminarily interpreted by myself shows sinus rhythm with a rate of 61. 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 Axis is -59.         Romeo Snowden MD  05/24/22 [id number]

## 2022-05-25 ENCOUNTER — ANESTHESIA (OUTPATIENT)
Dept: OPERATING ROOM | Age: 72
DRG: 519 | End: 2022-05-25
Payer: MEDICARE

## 2022-05-25 ENCOUNTER — APPOINTMENT (OUTPATIENT)
Dept: GENERAL RADIOLOGY | Age: 72
DRG: 519 | End: 2022-05-25
Payer: MEDICARE

## 2022-05-25 ENCOUNTER — ANESTHESIA EVENT (OUTPATIENT)
Dept: OPERATING ROOM | Age: 72
DRG: 519 | End: 2022-05-25
Payer: MEDICARE

## 2022-05-25 LAB
GLUCOSE BLD-MCNC: 178 MG/DL (ref 70–99)
PERFORMED ON: ABNORMAL

## 2022-05-25 PROCEDURE — 2720000010 HC SURG SUPPLY STERILE: Performed by: NEUROLOGICAL SURGERY

## 2022-05-25 PROCEDURE — 3700000001 HC ADD 15 MINUTES (ANESTHESIA): Performed by: NEUROLOGICAL SURGERY

## 2022-05-25 PROCEDURE — 00NY0ZZ RELEASE LUMBAR SPINAL CORD, OPEN APPROACH: ICD-10-PCS | Performed by: NEUROLOGICAL SURGERY

## 2022-05-25 PROCEDURE — 7100000000 HC PACU RECOVERY - FIRST 15 MIN: Performed by: NEUROLOGICAL SURGERY

## 2022-05-25 PROCEDURE — 3700000000 HC ANESTHESIA ATTENDED CARE: Performed by: NEUROLOGICAL SURGERY

## 2022-05-25 PROCEDURE — 01NB0ZZ RELEASE LUMBAR NERVE, OPEN APPROACH: ICD-10-PCS | Performed by: NEUROLOGICAL SURGERY

## 2022-05-25 PROCEDURE — 6360000002 HC RX W HCPCS: Performed by: NEUROLOGICAL SURGERY

## 2022-05-25 PROCEDURE — 97530 THERAPEUTIC ACTIVITIES: CPT

## 2022-05-25 PROCEDURE — 97166 OT EVAL MOD COMPLEX 45 MIN: CPT

## 2022-05-25 PROCEDURE — 2580000003 HC RX 258: Performed by: NEUROLOGICAL SURGERY

## 2022-05-25 PROCEDURE — 2709999900 HC NON-CHARGEABLE SUPPLY: Performed by: NEUROLOGICAL SURGERY

## 2022-05-25 PROCEDURE — 3209999900 FLUORO FOR SURGICAL PROCEDURES

## 2022-05-25 PROCEDURE — 2500000003 HC RX 250 WO HCPCS: Performed by: NURSE ANESTHETIST, CERTIFIED REGISTERED

## 2022-05-25 PROCEDURE — 1200000000 HC SEMI PRIVATE

## 2022-05-25 PROCEDURE — A4217 STERILE WATER/SALINE, 500 ML: HCPCS | Performed by: NEUROLOGICAL SURGERY

## 2022-05-25 PROCEDURE — 6370000000 HC RX 637 (ALT 250 FOR IP): Performed by: NEUROLOGICAL SURGERY

## 2022-05-25 PROCEDURE — 72020 X-RAY EXAM OF SPINE 1 VIEW: CPT

## 2022-05-25 PROCEDURE — 97116 GAIT TRAINING THERAPY: CPT

## 2022-05-25 PROCEDURE — 3600000014 HC SURGERY LEVEL 4 ADDTL 15MIN: Performed by: NEUROLOGICAL SURGERY

## 2022-05-25 PROCEDURE — 6360000002 HC RX W HCPCS: Performed by: NURSE ANESTHETIST, CERTIFIED REGISTERED

## 2022-05-25 PROCEDURE — 7100000001 HC PACU RECOVERY - ADDTL 15 MIN: Performed by: NEUROLOGICAL SURGERY

## 2022-05-25 PROCEDURE — 97535 SELF CARE MNGMENT TRAINING: CPT

## 2022-05-25 PROCEDURE — 3600000004 HC SURGERY LEVEL 4 BASE: Performed by: NEUROLOGICAL SURGERY

## 2022-05-25 PROCEDURE — 97162 PT EVAL MOD COMPLEX 30 MIN: CPT

## 2022-05-25 PROCEDURE — 2500000003 HC RX 250 WO HCPCS

## 2022-05-25 PROCEDURE — 2580000003 HC RX 258: Performed by: NURSE ANESTHETIST, CERTIFIED REGISTERED

## 2022-05-25 PROCEDURE — 2500000003 HC RX 250 WO HCPCS: Performed by: NEUROLOGICAL SURGERY

## 2022-05-25 RX ORDER — EPHEDRINE SULFATE/0.9% NACL/PF 50 MG/5 ML
SYRINGE (ML) INTRAVENOUS PRN
Status: DISCONTINUED | OUTPATIENT
Start: 2022-05-25 | End: 2022-05-25 | Stop reason: SDUPTHER

## 2022-05-25 RX ORDER — SODIUM CHLORIDE 9 MG/ML
INJECTION, SOLUTION INTRAVENOUS CONTINUOUS PRN
Status: DISCONTINUED | OUTPATIENT
Start: 2022-05-25 | End: 2022-05-25 | Stop reason: SDUPTHER

## 2022-05-25 RX ORDER — ROCURONIUM BROMIDE 10 MG/ML
INJECTION, SOLUTION INTRAVENOUS PRN
Status: DISCONTINUED | OUTPATIENT
Start: 2022-05-25 | End: 2022-05-25 | Stop reason: SDUPTHER

## 2022-05-25 RX ORDER — SODIUM CHLORIDE 0.9 % (FLUSH) 0.9 %
5-40 SYRINGE (ML) INJECTION EVERY 12 HOURS SCHEDULED
Status: DISCONTINUED | OUTPATIENT
Start: 2022-05-25 | End: 2022-05-26 | Stop reason: HOSPADM

## 2022-05-25 RX ORDER — PHENYLEPHRINE HCL IN 0.9% NACL 1 MG/10 ML
SYRINGE (ML) INTRAVENOUS PRN
Status: DISCONTINUED | OUTPATIENT
Start: 2022-05-25 | End: 2022-05-25 | Stop reason: SDUPTHER

## 2022-05-25 RX ORDER — ONDANSETRON 2 MG/ML
INJECTION INTRAMUSCULAR; INTRAVENOUS PRN
Status: DISCONTINUED | OUTPATIENT
Start: 2022-05-25 | End: 2022-05-25 | Stop reason: SDUPTHER

## 2022-05-25 RX ORDER — SODIUM CHLORIDE 9 MG/ML
INJECTION, SOLUTION INTRAVENOUS CONTINUOUS
Status: DISCONTINUED | OUTPATIENT
Start: 2022-05-25 | End: 2022-05-26 | Stop reason: HOSPADM

## 2022-05-25 RX ORDER — GLYCOPYRROLATE 0.2 MG/ML
INJECTION INTRAMUSCULAR; INTRAVENOUS PRN
Status: DISCONTINUED | OUTPATIENT
Start: 2022-05-25 | End: 2022-05-25 | Stop reason: SDUPTHER

## 2022-05-25 RX ORDER — ACETAMINOPHEN 325 MG/1
650 TABLET ORAL EVERY 4 HOURS PRN
Status: DISCONTINUED | OUTPATIENT
Start: 2022-05-25 | End: 2022-05-26 | Stop reason: HOSPADM

## 2022-05-25 RX ORDER — OXYCODONE HYDROCHLORIDE 5 MG/1
5 TABLET ORAL EVERY 4 HOURS PRN
Status: DISCONTINUED | OUTPATIENT
Start: 2022-05-25 | End: 2022-05-26 | Stop reason: HOSPADM

## 2022-05-25 RX ORDER — MAGNESIUM HYDROXIDE/ALUMINUM HYDROXICE/SIMETHICONE 120; 1200; 1200 MG/30ML; MG/30ML; MG/30ML
15 SUSPENSION ORAL EVERY 6 HOURS PRN
Status: DISCONTINUED | OUTPATIENT
Start: 2022-05-25 | End: 2022-05-26 | Stop reason: HOSPADM

## 2022-05-25 RX ORDER — LIDOCAINE HYDROCHLORIDE 20 MG/ML
INJECTION, SOLUTION EPIDURAL; INFILTRATION; INTRACAUDAL; PERINEURAL PRN
Status: DISCONTINUED | OUTPATIENT
Start: 2022-05-25 | End: 2022-05-25 | Stop reason: SDUPTHER

## 2022-05-25 RX ORDER — ONDANSETRON 4 MG/1
4 TABLET, ORALLY DISINTEGRATING ORAL EVERY 8 HOURS PRN
Status: DISCONTINUED | OUTPATIENT
Start: 2022-05-25 | End: 2022-05-26 | Stop reason: HOSPADM

## 2022-05-25 RX ORDER — SODIUM CHLORIDE 0.9 % (FLUSH) 0.9 %
5-40 SYRINGE (ML) INJECTION PRN
Status: DISCONTINUED | OUTPATIENT
Start: 2022-05-25 | End: 2022-05-26 | Stop reason: HOSPADM

## 2022-05-25 RX ORDER — SODIUM CHLORIDE 9 MG/ML
INJECTION, SOLUTION INTRAVENOUS PRN
Status: DISCONTINUED | OUTPATIENT
Start: 2022-05-25 | End: 2022-05-25 | Stop reason: HOSPADM

## 2022-05-25 RX ORDER — POLYETHYLENE GLYCOL 3350 17 G/17G
17 POWDER, FOR SOLUTION ORAL DAILY
Status: DISCONTINUED | OUTPATIENT
Start: 2022-05-25 | End: 2022-05-26 | Stop reason: HOSPADM

## 2022-05-25 RX ORDER — DEXAMETHASONE SODIUM PHOSPHATE 4 MG/ML
INJECTION, SOLUTION INTRA-ARTICULAR; INTRALESIONAL; INTRAMUSCULAR; INTRAVENOUS; SOFT TISSUE PRN
Status: DISCONTINUED | OUTPATIENT
Start: 2022-05-25 | End: 2022-05-25 | Stop reason: SDUPTHER

## 2022-05-25 RX ORDER — METHOCARBAMOL 750 MG/1
750 TABLET, FILM COATED ORAL 4 TIMES DAILY
Status: DISCONTINUED | OUTPATIENT
Start: 2022-05-25 | End: 2022-05-26 | Stop reason: HOSPADM

## 2022-05-25 RX ORDER — SODIUM CHLORIDE 0.9 % (FLUSH) 0.9 %
5-40 SYRINGE (ML) INJECTION EVERY 12 HOURS SCHEDULED
Status: DISCONTINUED | OUTPATIENT
Start: 2022-05-25 | End: 2022-05-25 | Stop reason: HOSPADM

## 2022-05-25 RX ORDER — SODIUM CHLORIDE 0.9 % (FLUSH) 0.9 %
5-40 SYRINGE (ML) INJECTION PRN
Status: DISCONTINUED | OUTPATIENT
Start: 2022-05-25 | End: 2022-05-25 | Stop reason: HOSPADM

## 2022-05-25 RX ORDER — SUCCINYLCHOLINE/SOD CL,ISO/PF 200MG/10ML
SYRINGE (ML) INTRAVENOUS PRN
Status: DISCONTINUED | OUTPATIENT
Start: 2022-05-25 | End: 2022-05-25 | Stop reason: SDUPTHER

## 2022-05-25 RX ORDER — FENTANYL CITRATE 50 UG/ML
25 INJECTION, SOLUTION INTRAMUSCULAR; INTRAVENOUS EVERY 5 MIN PRN
Status: DISCONTINUED | OUTPATIENT
Start: 2022-05-25 | End: 2022-05-25 | Stop reason: HOSPADM

## 2022-05-25 RX ORDER — BUPIVACAINE HYDROCHLORIDE AND EPINEPHRINE 5; 5 MG/ML; UG/ML
INJECTION, SOLUTION EPIDURAL; INTRACAUDAL; PERINEURAL
Status: COMPLETED | OUTPATIENT
Start: 2022-05-25 | End: 2022-05-25

## 2022-05-25 RX ORDER — GINSENG 100 MG
CAPSULE ORAL
Status: COMPLETED | OUTPATIENT
Start: 2022-05-25 | End: 2022-05-25

## 2022-05-25 RX ORDER — ONDANSETRON 2 MG/ML
4 INJECTION INTRAMUSCULAR; INTRAVENOUS
Status: DISCONTINUED | OUTPATIENT
Start: 2022-05-25 | End: 2022-05-25 | Stop reason: HOSPADM

## 2022-05-25 RX ORDER — FENTANYL CITRATE 50 UG/ML
INJECTION, SOLUTION INTRAMUSCULAR; INTRAVENOUS PRN
Status: DISCONTINUED | OUTPATIENT
Start: 2022-05-25 | End: 2022-05-25 | Stop reason: SDUPTHER

## 2022-05-25 RX ORDER — FENTANYL CITRATE 50 UG/ML
50 INJECTION, SOLUTION INTRAMUSCULAR; INTRAVENOUS EVERY 5 MIN PRN
Status: DISCONTINUED | OUTPATIENT
Start: 2022-05-25 | End: 2022-05-25 | Stop reason: HOSPADM

## 2022-05-25 RX ORDER — ONDANSETRON 2 MG/ML
4 INJECTION INTRAMUSCULAR; INTRAVENOUS EVERY 6 HOURS PRN
Status: DISCONTINUED | OUTPATIENT
Start: 2022-05-25 | End: 2022-05-26 | Stop reason: HOSPADM

## 2022-05-25 RX ORDER — OXYCODONE HYDROCHLORIDE 5 MG/1
5 TABLET ORAL PRN
Status: DISCONTINUED | OUTPATIENT
Start: 2022-05-25 | End: 2022-05-25 | Stop reason: HOSPADM

## 2022-05-25 RX ORDER — KETOROLAC TROMETHAMINE 30 MG/ML
INJECTION, SOLUTION INTRAMUSCULAR; INTRAVENOUS PRN
Status: DISCONTINUED | OUTPATIENT
Start: 2022-05-25 | End: 2022-05-25 | Stop reason: SDUPTHER

## 2022-05-25 RX ORDER — OXYCODONE HYDROCHLORIDE 10 MG/1
10 TABLET ORAL EVERY 4 HOURS PRN
Status: DISCONTINUED | OUTPATIENT
Start: 2022-05-25 | End: 2022-05-26 | Stop reason: HOSPADM

## 2022-05-25 RX ORDER — PROPOFOL 10 MG/ML
INJECTION, EMULSION INTRAVENOUS PRN
Status: DISCONTINUED | OUTPATIENT
Start: 2022-05-25 | End: 2022-05-25 | Stop reason: SDUPTHER

## 2022-05-25 RX ORDER — SODIUM CHLORIDE 9 MG/ML
INJECTION, SOLUTION INTRAVENOUS PRN
Status: DISCONTINUED | OUTPATIENT
Start: 2022-05-25 | End: 2022-05-26 | Stop reason: HOSPADM

## 2022-05-25 RX ORDER — OXYCODONE HYDROCHLORIDE 10 MG/1
10 TABLET ORAL PRN
Status: DISCONTINUED | OUTPATIENT
Start: 2022-05-25 | End: 2022-05-25 | Stop reason: HOSPADM

## 2022-05-25 RX ADMIN — AMIODARONE HYDROCHLORIDE 200 MG: 200 TABLET ORAL at 14:09

## 2022-05-25 RX ADMIN — GLIPIZIDE 10 MG: 5 TABLET ORAL at 17:01

## 2022-05-25 RX ADMIN — Medication 9 MG: at 23:08

## 2022-05-25 RX ADMIN — LOSARTAN POTASSIUM 25 MG: 25 TABLET, FILM COATED ORAL at 14:09

## 2022-05-25 RX ADMIN — Medication 100 MCG: at 07:55

## 2022-05-25 RX ADMIN — PANTOPRAZOLE SODIUM 40 MG: 40 TABLET, DELAYED RELEASE ORAL at 21:12

## 2022-05-25 RX ADMIN — Medication 100 MCG: at 08:08

## 2022-05-25 RX ADMIN — Medication 10 MG: at 09:03

## 2022-05-25 RX ADMIN — PROPOFOL 175 MG: 10 INJECTION, EMULSION INTRAVENOUS at 07:32

## 2022-05-25 RX ADMIN — SUGAMMADEX 100 MG: 100 INJECTION, SOLUTION INTRAVENOUS at 10:43

## 2022-05-25 RX ADMIN — Medication 1500 MG: at 14:23

## 2022-05-25 RX ADMIN — ONDANSETRON 4 MG: 2 INJECTION INTRAMUSCULAR; INTRAVENOUS at 10:21

## 2022-05-25 RX ADMIN — HYDROMORPHONE HYDROCHLORIDE 0.25 MG: 1 INJECTION, SOLUTION INTRAMUSCULAR; INTRAVENOUS; SUBCUTANEOUS at 10:43

## 2022-05-25 RX ADMIN — KETOROLAC TROMETHAMINE 15 MG: 30 INJECTION, SOLUTION INTRAMUSCULAR at 10:43

## 2022-05-25 RX ADMIN — ROCURONIUM BROMIDE 40 MG: 10 SOLUTION INTRAVENOUS at 08:08

## 2022-05-25 RX ADMIN — Medication 10 MG: at 11:01

## 2022-05-25 RX ADMIN — Medication 10 MG: at 08:44

## 2022-05-25 RX ADMIN — Medication 100 MCG: at 09:45

## 2022-05-25 RX ADMIN — FENTANYL CITRATE 75 MCG: 50 INJECTION INTRAMUSCULAR; INTRAVENOUS at 07:32

## 2022-05-25 RX ADMIN — SODIUM CHLORIDE: 9 INJECTION, SOLUTION INTRAVENOUS at 07:28

## 2022-05-25 RX ADMIN — METHOCARBAMOL 750 MG: 750 TABLET ORAL at 14:09

## 2022-05-25 RX ADMIN — LIDOCAINE HYDROCHLORIDE 80 MG: 20 INJECTION, SOLUTION EPIDURAL; INFILTRATION; INTRACAUDAL; PERINEURAL at 07:32

## 2022-05-25 RX ADMIN — METHOCARBAMOL 750 MG: 750 TABLET ORAL at 21:11

## 2022-05-25 RX ADMIN — SODIUM CHLORIDE: 9 INJECTION, SOLUTION INTRAVENOUS at 07:50

## 2022-05-25 RX ADMIN — Medication 100 MCG: at 09:03

## 2022-05-25 RX ADMIN — HYDROMORPHONE HYDROCHLORIDE 0.25 MG: 1 INJECTION, SOLUTION INTRAMUSCULAR; INTRAVENOUS; SUBCUTANEOUS at 10:52

## 2022-05-25 RX ADMIN — METHOCARBAMOL 750 MG: 750 TABLET ORAL at 17:01

## 2022-05-25 RX ADMIN — NIACIN 500 MG: 500 TABLET, EXTENDED RELEASE ORAL at 21:12

## 2022-05-25 RX ADMIN — CEFAZOLIN 2 G: 10 INJECTION, POWDER, FOR SOLUTION INTRAVENOUS at 07:28

## 2022-05-25 RX ADMIN — OXYCODONE 5 MG: 5 TABLET ORAL at 23:09

## 2022-05-25 RX ADMIN — Medication 10 MG: at 09:45

## 2022-05-25 RX ADMIN — METFORMIN HYDROCHLORIDE 1000 MG: 500 TABLET ORAL at 17:01

## 2022-05-25 RX ADMIN — Medication 10 MG: at 10:43

## 2022-05-25 RX ADMIN — SODIUM CHLORIDE: 9 INJECTION, SOLUTION INTRAVENOUS at 14:10

## 2022-05-25 RX ADMIN — Medication 100 MCG: at 08:33

## 2022-05-25 RX ADMIN — ATORVASTATIN CALCIUM 40 MG: 40 TABLET, FILM COATED ORAL at 21:12

## 2022-05-25 RX ADMIN — ALOGLIPTIN 25 MG: 25 TABLET, FILM COATED ORAL at 14:18

## 2022-05-25 RX ADMIN — Medication 160 MG: at 07:32

## 2022-05-25 RX ADMIN — GLYCOPYRROLATE 0.2 MG: 0.2 INJECTION, SOLUTION INTRAMUSCULAR; INTRAVENOUS at 07:28

## 2022-05-25 RX ADMIN — DEXAMETHASONE SODIUM PHOSPHATE 8 MG: 4 INJECTION, SOLUTION INTRAMUSCULAR; INTRAVENOUS at 07:51

## 2022-05-25 RX ADMIN — Medication 100 MCG: at 08:24

## 2022-05-25 RX ADMIN — POLYETHYLENE GLYCOL 3350 17 G: 17 POWDER, FOR SOLUTION ORAL at 14:10

## 2022-05-25 RX ADMIN — FENTANYL CITRATE 25 MCG: 50 INJECTION INTRAMUSCULAR; INTRAVENOUS at 07:28

## 2022-05-25 ASSESSMENT — PAIN SCALES - GENERAL
PAINLEVEL_OUTOF10: 0
PAINLEVEL_OUTOF10: 0
PAINLEVEL_OUTOF10: 4
PAINLEVEL_OUTOF10: 1
PAINLEVEL_OUTOF10: 0
PAINLEVEL_OUTOF10: 2
PAINLEVEL_OUTOF10: 0
PAINLEVEL_OUTOF10: 1

## 2022-05-25 ASSESSMENT — PAIN DESCRIPTION - PAIN TYPE
TYPE: ACUTE PAIN

## 2022-05-25 ASSESSMENT — PAIN - FUNCTIONAL ASSESSMENT
PAIN_FUNCTIONAL_ASSESSMENT: ACTIVITIES ARE NOT PREVENTED
PAIN_FUNCTIONAL_ASSESSMENT: 0-10

## 2022-05-25 ASSESSMENT — ENCOUNTER SYMPTOMS: SHORTNESS OF BREATH: 0

## 2022-05-25 ASSESSMENT — PAIN DESCRIPTION - LOCATION
LOCATION: BACK

## 2022-05-25 ASSESSMENT — PAIN DESCRIPTION - ONSET
ONSET: ON-GOING

## 2022-05-25 ASSESSMENT — PAIN DESCRIPTION - DESCRIPTORS
DESCRIPTORS: ACHING

## 2022-05-25 ASSESSMENT — PAIN DESCRIPTION - ORIENTATION
ORIENTATION: LOWER

## 2022-05-25 ASSESSMENT — PAIN DESCRIPTION - FREQUENCY
FREQUENCY: INTERMITTENT

## 2022-05-25 ASSESSMENT — LIFESTYLE VARIABLES: SMOKING_STATUS: 0

## 2022-05-25 NOTE — H&P
Update History & Physical    The patient's History and Physical of May 25, 2022 was reviewed with the patient and I examined the patient. There was no change. The surgical site was confirmed by the patient and me. Plan: The risks, benefits, expected outcome, and alternative to the recommended procedure have been discussed with the patient. Patient understands and wants to proceed with the procedure.      Electronically signed by Elijah Colvin MD on 5/25/2022 at 7:20 AM

## 2022-05-25 NOTE — BRIEF OP NOTE
Brief Postoperative Note      Patient: Morelia De Los Santos Dr.  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

## 2022-05-25 NOTE — PROGRESS NOTES
PACU Transfer Note    Vitals:    05/25/22 1145   BP: 133/78   Pulse: 74   Resp: 10   Temp: 97.6 °F (36.4 °C)   SpO2: 96%       In: 1700 [I.V.:1700]  Out: 1400 [Urine:1000]    Pain assessment:  none  Pain Level: 0    Report given to Receiving unit Amado BATES.    5/25/2022 11:57 AM

## 2022-05-25 NOTE — PROGRESS NOTES
Physical Therapy  Facility/Department: [address] ORTHOPEDICS  Physical Therapy Initial Assessment    Name: Dr. Margie Hammond Ing: 1950  MRN: [id number]  Date of Service: 5/25/2022    Assessment / Discharge Recommendations:    -good start with initial efforts mobilizing from bed (via log roll technique) to ambulate with rolling walker in room   -anticipate discharge to home with family assist when medically ready  -will see tomorrow to help determine if will need rolling walker or other AD initially  -do not anticipate need of Home PT      Patient Diagnosis(es): The primary encounter diagnosis was Numbness and tingling. Diagnoses of Paresthesias and Lumbar disc disease with radiculopathy were also pertinent to this visit. Past Medical History:  has a past medical history of Cancer ([facility] Diabetes mellitus ([facility] [facility] and Fracture cervical vertebra-closed ([facility] [address] [facility] Past Surgical History:  has a past surgical history that includes Tonsillectomy; hernia repair; Mohs surgery (10/02/2018); Colonoscopy (N/A, 1/22/2021); and Lumbar spine surgery (N/A, 5/25/2022). Body Structures, Functions, Activity Limitations Requiring Skilled Therapeutic Intervention: Decreased functional mobility ; Decreased ADL status; Decreased sensation  Therapy Prognosis: Good  Decision Making: Medium Complexity  Requires PT Follow-Up: Yes  Activity Tolerance  Activity Tolerance: Patient tolerated treatment well     Plan   Plan  Current Treatment Recommendations: Functional mobility training,Transfer training,ADL/Self-care training,Gait training,Stair training,Positioning,Patient/Caregiver education & training  Plan Comment: qdx1-2 then 3-5 while on acute floor  Safety Devices  Type of Devices: Nurse notified,Left in chair,Patient at risk for falls,Call light within reach,Chair alarm in place Ron Beam)     Restrictions  Restrictions/Precautions  Restrictions/Precautions: Fall Risk  Position Activity Restriction  Spinal Precautions: No Bending,No Lifting,No Twisting  Other position/activity restrictions: Velez catheter and IV     Subjective   General  Chart Reviewed: Yes  Patient assessed for rehabilitation services?: Yes  Additional Pertinent Hx: here due to cauda equina necessitating urgent decompressive laminectomy    PMH includes lumbar DDD and cervical fracture  Response To Previous Treatment: Not applicable  Family / Caregiver Present: Yes (wife)  Follows Commands: Within Functional Limits  Subjective  Subjective: arrived to room along with OT to patient resting in bed - alert oriented (nursing had called as he would like to sit to EOB to eat lunch) just up from surgery and states has minimal ache in low back and LEs symptoms improving         Social/Functional History  Social/Functional History  Lives With: Spouse  Type of Home: House  Home Layout: One level  Home Access: Stairs to enter without rails  Entrance Stairs - Number of Steps: 1  Bathroom Shower/Tub: Walk-in shower  Bathroom Toilet: Standard (vanity near)  Bathroom Equipment: Built-in shower seat,Toilet raiser  Home Equipment: Cane (plans to borrow RW from [employer])  Has the patient had two or more falls in the past year or any fall with injury in the past year?: No  ADL Assistance: Independent  Homemaking Assistance: Independent  Ambulation Assistance: Independent (cane last ~1 weeks with mild unsteadiness (d/t significant numbness in BLE))  Transfer Assistance: Independent  Occupation: Part time employment  Type of Occupation: Chiropractor  Leisure & Hobbies: motorcycles. Planes.  Horses  Vision/Hearing  Hearing: Within functional limits    Cognition   Orientation  Overall Orientation Status: Within Functional Limits  Cognition  Overall Cognitive Status: WFL     Objective   Gross Assessment  Tone: Normal  Sensation:  (not assessed formally in LEs at this session)   Bed mobility  Sit to Supine: Contact guard assistance  Transfers  Sit to Stand: Contact guard assistance  Stand to sit: Contact guard assistance  Ambulation  Surface: level tile  Device: Rolling Walker  Assistance: Contact guard assistance  Quality of Gait: partial step through pattern with adequate base of support  Distance: in room for ~20 feet to reach the recliner  Comments: mild unsteadiness  Stairs/Curb  Stairs?: No  Balance  Comments: midline in sitting at the EOB and in static stance on the walker  -dynamic is fair on rolling walker (limited observation)    AM-PAC Score  AM-PAC Inpatient Mobility Raw Score : 17 (05/25/22 1330)  AM-PAC Inpatient T-Scale Score : 42.13 (05/25/22 1330)  Mobility Inpatient CMS 0-100% Score: 50.57 (05/25/22 1330)  Mobility Inpatient CMS G-Code Modifier : CK (05/25/22 1330)     Goals  Short Term Goals  Time Frame for Short term goals: 1-2 days  Short term goal 1: bed mobility at sba in log roll fashion  Short term goal 2: transfers at supervision  Short term goal 3: ambulation at supervision with AD as needed for safety and effectiveness for household distances     -steps as needed for home entry at [facility] one rail  Patient Goals   Patient goals : recovery of normal strength and sensation       Education  Patient Education  Education Given To: Patient; Family  Education Provided: Role of Therapy;Plan of Care;Precautions  Education Method: Demonstration;Verbal  Barriers to Learning: None  Education Outcome: Verbalized understanding;Continued education needed      Therapy Time   Individual Concurrent Group Co-treatment   Time In 1255         Time Out 1350         Minutes 2050 [place],

## 2022-05-25 NOTE — PROGRESS NOTES
Occupational Therapy  Facility/Department: [facility]  ORTHOPEDICS  Occupational Therapy Initial Assessment    Name: Luna Maguire Dr.  Maxey Boas: 1950  MRN: [id number]  Date of Service: 5/25/2022    Discharge Recommendations:  24 hour supervision or assist        Luna Maguire Dr. scored a 18/24 on the AM-Merged with [facility] ADL Inpatient form. At this time, no further OT is recommended upon discharge. Recommend patient returns to prior setting with prior services. Patient Diagnosis(es): The primary encounter diagnosis was Numbness and tingling. Diagnoses of Paresthesias and Lumbar disc disease with radiculopathy were also pertinent to this visit. Past Medical History:  has a past medical history of Cancer ([facility] [address] Diabetes mellitus ([facility] [address] and Fracture cervical vertebra-closed ([facility] [address] Past Surgical History:  has a past surgical history that includes Tonsillectomy; hernia repair; Mohs surgery (10/02/2018); Colonoscopy (N/A, 1/22/2021); and Lumbar spine surgery (N/A, 5/25/2022). Treatment Diagnosis: impaired ADL/fxl mobility    Assessment   Performance deficits / Impairments: Decreased functional mobility ; Decreased endurance;Decreased ADL status; Decreased high-level IADLs;Decreased sensation;Decreased balance  Assessment: 69 yo male admitted for cauda equina syndrome requiring s/p 5/24 Urgent decompressive lumbar laminectomy of L3, L4, L5. PMH: HNT, cervical spine fx, DM, DDD, Rosacea. 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 However, decreased BLE sensation requiring cane with unsteady fxl mobility. Today, pt functioning below baseline most limited by spinal precaution and decreased balance. Pt required CGA for bed mobility (log rolling), fxl tx and fxl mobility with RW household distances (mild unsteadiness). Pt completes feeding and grooming seated set up. BUE WFL for self care. Anticipate Mod A LB and set up seated UB ADL based on spinal precautions, balance, endurance, cognition and PLOF.  Cont acute OT to address above deficits. No ADL DME needs. Rec return home with wife for 24 hr SUP  Treatment Diagnosis: impaired ADL/fxl mobility  Prognosis: Good  Decision Making: Medium Complexity  REQUIRES OT FOLLOW-UP: Yes  Activity Tolerance  Activity Tolerance: Patient Tolerated treatment well        Plan   Plan  Times per Week: 2-3 sessions  Specific Instructions for Next Treatment: LB AE  Current Treatment Recommendations: Strengthening,ROM,Balance training,Functional mobility training,Endurance training,Pain management,Safety education & training,Positioning,Modalities,Self-Care / ADL,Home management training     Restrictions  Restrictions/Precautions  Restrictions/Precautions: Fall Risk  Position Activity Restriction  Spinal Precautions: No Bending,No Lifting,No Twisting  Other position/activity restrictions: Velez catheter and IV    Subjective   General  Chart Reviewed: Yes  Patient assessed for rehabilitation services?: Yes  Additional Pertinent Hx: 71 yo male admitted for cauda equina syndrome requiring s/p 5/24 Urgent decompressive lumbar laminectomy of L3, L4, L5.  PMH: HNT, cervical spine fx, DM, DDD, Rosacea,  Family / Caregiver Present: Yes (wife)  Referring Practitioner: Jimmy Reyes MD  Diagnosis: cauda equina syndrome  Subjective  Subjective: Pt resting in bed upon arrival and agreeable to OT/PT eval. Pt reporting aching in back at incision site, reports improvement in BLE and pericare numbness  General Comment  Comments: RN ok to see     Social/Functional History  Social/Functional History  Lives With: Spouse  Type of Home: House  Home Layout: One level  Home Access: Stairs to enter without rails  Entrance Stairs - Number of Steps: 1  Bathroom Shower/Tub: Walk-in shower  Bathroom Toilet: Standard (vanity near)  Bathroom Equipment: Built-in shower seat,Toilet raiser  Home Equipment: Cane (plans to borrow RW from [employer])  Has the patient had two or more falls in the past year or any fall with injury in the past year?: No  ADL Assistance: Independent  Homemaking Assistance: Independent  Ambulation Assistance: Independent (cane last ~1 weeks with mild unsteadiness (d/t significant numbness in BLE))  Transfer Assistance: Independent  Occupation: Part time employment  Type of Occupation: Chiropractor  Leisure & Hobbies: motorcycles. Planes. Horses       Objective   Vision Exceptions: Wears glasses at all times  Hearing: Within functional limits       Observation/Palpation  Observation: middle/lower back incision, dry  Safety Devices  Type of Devices: Nurse notified; Left in chair;Patient at risk for falls;Call light within reach; Chair alarm in place [facility])  Balance  Sitting:  (SBA at EOB and recliner)  Standing:  (PRN tx with BUE on RW CGA)  Gait  Overall Level of Assistance: Contact-guard assistance (EOB>around foot of bed>recliner with RW. mild unsteadiness, no LOB.)     AROM: Within functional limits  Strength: Within functional limits  Coordination: Within functional limits  Tone: Normal  Sensation:  (reports mild decreased sensation in periarea (improved compared to prior to surgery))  ADL  Feeding: Setup  Grooming: Setup (seated face washing)  Toileting:  (dylan)  Additional Comments: Anticipate Mod A LB and set up seated UB ADL based on spinal precautions, balance, endurance, cognition and PLOF     Activity Tolerance  Activity Tolerance: Patient tolerated treatment well  Bed mobility  Supine to Sit: Contact guard assistance (log rolling  Simultaneous filing. User may not have seen previous data.)  Transfers  Sit to stand: Contact guard assistance  Stand to sit: Contact guard assistance  Transfer Comments: RW. cues hand placement     Cognition  Overall Cognitive Status: [facility]                  Education Given To: Patient; Family  Education Provided: Role of Therapy;Plan of Care;Transfer Training;Precautions  Education Provided Comments: spinal precautions  Education Method: Verbal  Education Outcome: Verbalized understanding;Continued education needed                      AM-PAC Score        AM-PAC Inpatient Daily Activity Raw Score: 18 (05/25/22 1401)  AM-PAC Inpatient ADL T-Scale Score : 38.66 (05/25/22 1401)  ADL Inpatient CMS 0-100% Score: 46.65 (05/25/22 1401)  ADL Inpatient CMS G-Code Modifier : CK (05/25/22 1401)    Goals  Short Term Goals  Time Frame for Short term goals: prior to d/c  Short Term Goal 1: toileting SUP  Short Term Goal 2: LB dressing with AE SUP  Short Term Goal 3: UB dressing set up  Short Term Goal 4: tolerate 5 min fx standing task SUP  Short Term Goal 5: fxl tx and mobility with RW household distances SUP  Patient Goals   Patient goals : \"get back to my hobbies\"       Therapy Time   Individual Concurrent Group Co-treatment   Time In 1255         Time Out 1350         Minutes 55         Timed Code Treatment Minutes: 40 Minutes (15 eval. 10 ADL.  30 TA)       Myah Lauren, JOYCE, OTR/L

## 2022-05-25 NOTE — ANESTHESIA PRE PROCEDURE
Department of Anesthesiology  Preprocedure Note       Name:  Maura Young Dr.   Age:  70 y.o.  :  1950                                          MRN:  [id number]         Date:  2022      Surgeon: Dmitry Cifuentes):  Tonya Barajas MD    Procedure: Procedure(s):  DECOMPRESSIVE LUMBAR LAMINECTOMY L3, L4, L5    Medications prior to admission:   Prior to Admission medications    Medication Sig Start Date End Date Taking?  Authorizing Provider   dexamethasone (DECADRON) 4 MG tablet Take 4 mg by mouth 3 times daily    Historical Provider, MD   JARDIANCE 25 MG tablet Take 1 tablet by mouth once daily 22   Shi Howard MD   amiodarone (CORDARONE) 200 MG tablet Take 200 mg by mouth daily 22   Historical Provider, MD   zinc gluconate 50 MG tablet Take 50 mg by mouth daily     Historical Provider, MD   pantoprazole (PROTONIX) 40 MG tablet Take 40 mg by mouth in the morning and at bedtime  22   Historical Provider, MD   niacin 500 MG tablet Take 500 mg by mouth every evening    Historical Provider, MD   Biotin 1000 MCG CHEW Take 1,000 mcg by mouth daily     Historical Provider, MD   ascorbic acid (VITAMIN C) 250 MG tablet Take 250 mg by mouth daily    Historical Provider, MD   ELIQUIS 5 MG TABS tablet Take 5 mg by mouth 2 times daily  22   Historical Provider, MD   tadalafil (CIALIS) 5 MG tablet TAKE 1 TABLET BY MOUTH ONCE DAILY AS NEEDED FOR ERECTILE DYSFUNCTION 3/30/22   Issa Musa MD   atorvastatin (LIPITOR) 40 MG tablet Take 1 tablet by mouth once daily 3/16/22   Issa Musa MD   JANUMET  MG per tablet TAKE 1 TABLET BY MOUTH TWICE DAILY WITH MEALS  Patient taking differently: Take 1 tablet by mouth 2 times daily (with meals)  22   Issa Musa MD   losartan (COZAAR) 25 MG tablet Take 1 tablet by mouth once daily 22   Issa Musa MD   glipiZIDE (GLUCOTROL) 10 MG tablet TAKE 1 TABLET BY MOUTH TWICE DAILY BEFORE MEAL(S) 21   Issa Musa MD   mometasone (ELOCON) 0.1 % cream Apply to affected areas of the skin twice daily for up 2 weeks or until improved. For dermatitis. 9/29/21   Arjun Stack MD   acetaminophen (TYLENOL) 325 MG tablet Take 650 mg by mouth every 6 hours as needed for Pain. Historical Provider, MD   Cholecalciferol (VITAMIN D-3) 5000 UNITS TABS Take 5,000 Units by mouth daily     Historical Provider, MD   vitamin E 1000 UNITS capsule Take 1,000 Units by mouth daily. Historical Provider, MD       Current medications:    No current facility-administered medications for this visit. No current outpatient medications on file.      Facility-Administered Medications Ordered in Other Visits   Medication Dose Route Frequency Provider Last Rate Last Admin    ceFAZolin (ANCEF) 2000 mg in dextrose 5 % 100 mL IVPB  2,000 mg IntraVENous On Call to [address] [place] [address] [address] Woo Taylor MD        amiodarone (CORDARONE) tablet 200 mg  200 mg Oral Daily Renata Franco MD   200 mg at 05/24/22 1607    atorvastatin (LIPITOR) tablet 40 mg  40 mg Oral Nightly Renata Franco MD   40 mg at 05/24/22 2039    glipiZIDE (GLUCOTROL) tablet 10 mg  10 mg Oral BID AC Renata Franco MD   10 mg at 05/24/22 1607    empagliflozin (JARDIANCE) tablet 25 mg  25 mg Oral Daily Renata Franco MD        losartan (COZAAR) tablet 25 mg  25 mg Oral Daily Renata Franco MD   25 mg at 05/24/22 1607    niacin (NIASPAN) extended release tablet 500 mg  500 mg Oral Nightly Renata Franco MD   500 mg at 05/24/22 2039    pantoprazole (PROTONIX) tablet 40 mg  40 mg Oral BID Renata Franco MD   40 mg at 05/24/22 2039    sodium chloride flush 0.9 % injection 5-40 mL  5-40 mL IntraVENous 2 times per day Renata Franco MD        sodium chloride flush 0.9 % injection 5-40 mL  5-40 mL IntraVENous PRN Renata Franco MD        0.9 % sodium chloride infusion   IntraVENous PRN Renata Franco MD        metFORMIN (GLUCOPHAGE) tablet 1,000 mg  1,000 mg Oral BID WC Jackie Escamilla MD   1,000 mg at 05/24/22 1614    alogliptin (NESINA) tablet 25 mg  25 mg Oral Daily with breakfast Jackie Escamilla MD        melatonin tablet 9 mg  9 mg Oral Nightly PRN Jackie Escamilla MD   9 mg at 05/24/22 2304       Allergies: Allergies   Allergen Reactions    No Known Allergies        Problem List:    Patient Active Problem List   Diagnosis Code    Cervical spine fracture (HCC) S12. 9XXA    HTN (hypertension) I10    History of nephrolithiasis Z87.442    History of basal cell carcinoma Z85.828    History of prostatitis Z87.438    Type 2 diabetes mellitus with complication, without long-term current use of insulin (HCC) E11.8    Subclinical hypothyroidism E03.8    Other male erectile dysfunction N52.8    Rosacea L71.9    Basal cell carcinoma of left cheek C44.319    DDD (degenerative disc disease), cervical M50.30    History of colonoscopy with polypectomy - 2014, 202, Q5 yr Z98.890, Z86.010    Cauda equina syndrome not affecting bladder ([facility]) G83.4       Past Medical History:        Diagnosis Date    Cancer ([facility] [address]     [address] R nasal tip    Diabetes mellitus ([facility] [address]     Fracture cervical vertebra-closed ([facility] [address] 3/16/13       Past Surgical History:        Procedure Laterality Date    COLONOSCOPY N/A 1/22/2021    COLONOSCOPY WITH BIOPSY performed by Sharita Cox MD at [address] SURGERY  10/02/2018    right nasal tip    TONSILLECTOMY         Social History:    Social History     Tobacco Use    Smoking status: Never Smoker    Smokeless tobacco: Never Used   Substance Use Topics    Alcohol use: Yes     Comment: occasionally                                Counseling given: Not Answered      Vital Signs (Current): There were no vitals filed for this visit.                                            BP Readings from Last 3 Encounters:   05/25/22 136/88   05/18/22 122/78 11/19/21 130/68       NPO Status:                                                                                 BMI:   Wt Readings from Last 3 Encounters:   05/25/22 200 lb 9.9 oz (91 kg)   05/18/22 203 lb (92.1 kg)   11/19/21 202 lb (91.6 kg)     There is no height or weight on file to calculate BMI.    CBC:   Lab Results   Component Value Date    WBC 10.8 05/24/2022    RBC 4.92 05/24/2022    HGB 14.5 05/24/2022    HCT 43.9 05/24/2022    MCV 89.4 05/24/2022    RDW 17.2 05/24/2022     05/24/2022       CMP:   Lab Results   Component Value Date     05/24/2022    K 4.0 05/24/2022     05/24/2022    CO2 16 05/24/2022    BUN 37 05/24/2022    CREATININE 1.2 05/24/2022    GFRAA >60 05/24/2022    GFRAA 130 03/16/2013    AGRATIO 1.4 05/24/2022    LABGLOM 60 05/24/2022    GLUCOSE 148 05/24/2022    PROT 7.3 05/24/2022    CALCIUM 9.5 05/24/2022    BILITOT 0.4 05/24/2022    ALKPHOS 57 05/24/2022    AST 19 05/24/2022    ALT 21 05/24/2022       POC Tests: No results for input(s): POCGLU, POCNA, POCK, POCCL, POCBUN, POCHEMO, POCHCT in the last 72 hours.     Coags:   Lab Results   Component Value Date    PROTIME 14.0 05/24/2022    INR 1.23 05/24/2022    APTT 29.2 05/24/2022       HCG (If Applicable): No results found for: PREGTESTUR, PREGSERUM, HCG, HCGQUANT     ABGs: No results found for: PHART, PO2ART, EUN1PZW, UGP0NVA, BEART, U1XWEPTS     Type & Screen (If Applicable):  No results found for: LABABO, LABRH    Drug/Infectious Status (If Applicable):  No results found for: HIV, HEPCAB    COVID-19 Screening (If Applicable):   Lab Results   Component Value Date    COVID19 Not Detected 01/18/2021         Anesthesia Evaluation  Patient summary reviewed no history of anesthetic complications:   Airway: Mallampati: III  TM distance: >3 FB   Neck ROM: full  Mouth opening: > = 3 FB   Dental:      Comment: No loose teeth    Pulmonary: breath sounds clear to auscultation      (-) COPD, asthma, shortness of breath, recent URI, sleep apnea and not a current smoker                           Cardiovascular:    (+) hypertension:, hyperlipidemia    (-) valvular problems/murmurs, past MI, CAD, CABG/stent, dysrhythmias,  angina,  CHF and no pulmonary hypertension      Rhythm: regular  Rate: normal                    Neuro/Psych:   (+) neuromuscular disease (DDD):,    (-) seizures, TIA, CVA and headaches           GI/Hepatic/Renal:   (+) bowel prep,      (-) GERD, PUD, hepatitis, liver disease and no renal disease       Endo/Other:    (+) Diabetes (A1c 8.0 Sept 2021)Type II DM, , hypothyroidism, blood dyscrasia (eliquis)::., no malignancy/cancer. (-) no malignancy/cancer               Abdominal:             Vascular: Other Findings:             Anesthesia Plan      general     ASA 3       Induction: intravenous. MIPS: Postoperative opioids intended, Prophylactic antiemetics administered and Postoperative trial extubation. Anesthetic plan and risks discussed with patient. Use of blood products discussed with patient whom consented to blood products. Plan discussed with CRNA. This pre-anesthesia assessment may be used as a history and physical.    DOS STAFF ADDENDUM:    Pt seen and examined, chart reviewed (including anesthesia, drug and allergy history). No interval changes to history and physical examination. Anesthetic plan, risks, benefits, alternatives, and personnel involved discussed with patient. Patient verbalized an understanding and agrees to proceed.       Leona Javier MD  May 25, 2022  6:45 AM      Leona Javier MD   5/25/2022

## 2022-05-25 NOTE — ANESTHESIA POSTPROCEDURE EVALUATION
Department of Anesthesiology  Postprocedure Note    Patient: Judith Dangelo Dr.  MRN: [id number]  YOB: 1950  Date of evaluation: 5/25/2022  Time:  12:05 PM     Procedure Summary     Date: 05/25/22 Room / Location: [address] [place] [address] / [facility]    Anesthesia Start: 0261 Anesthesia Stop: 6066    Procedure: DECOMPRESSIVE LUMBAR LAMINECTOMY L3, L4, L5 (N/A Spine Lumbar) Diagnosis:       Cauda equina syndrome ([facility] [address]      (Cauda equina syndrome)    Surgeons: Eugene Farley MD Responsible Provider: Prudencio Briones MD    Anesthesia Type: general ASA Status: 3          Anesthesia Type: No value filed. Trudi Phase I: Trudi Score: 10    Trudi Phase II:      Last vitals: Reviewed and per EMR flowsheets.        Anesthesia Post Evaluation    Patient location during evaluation: PACU  Patient participation: complete - patient participated  Level of consciousness: awake and alert  Pain score: 0  Airway patency: patent  Nausea & Vomiting: no nausea and no vomiting  Complications: no  Cardiovascular status: blood pressure returned to baseline  Respiratory status: acceptable  Hydration status: euvolemic

## 2022-05-25 NOTE — PROGRESS NOTES
Patient admitted to PACU # 7 from OR at 1115 post DECOMPRESSIVE LUMBAR LAMINECTOMY L3, L4, L5  per Dr. Wale Brennan. Attached to PACU monitoring system and report received from anesthesia provider. Patient was reported to be hemodynamically stable during procedure. Patient drowsy on admission and denied pain.

## 2022-05-25 NOTE — PROGRESS NOTES
Patient reporting improved numbness in lower extremities. Patient stating \"it's better than it was. Like the radio is turned down, but the frequency is higher\".

## 2022-05-25 NOTE — PLAN OF CARE
Problem: Discharge Planning  Goal: Discharge to home or other facility with appropriate resources  5/25/2022 0010 by Faustino Mobley RN  Outcome: Progressing  Flowsheets  Taken 5/24/2022 2040 by Faustino Mobley RN  Discharge to home or other facility with appropriate resources: Identify barriers to discharge with patient and caregiver  Taken 5/24/2022 1640 by Eddie Wagner RN  Discharge to home or other facility with appropriate resources: Identify barriers to discharge with patient and caregiver  5/24/2022 1524 by Eddie Wagner RN  Outcome: Progressing  Flowsheets (Taken 5/24/2022 1518)  Discharge to home or other facility with appropriate resources:   Identify barriers to discharge with patient and caregiver   Identify discharge learning needs (meds, wound care, etc)   Refer to discharge planning if patient needs post-hospital services based on physician order or complex needs related to functional status, cognitive ability or social support system   Arrange for needed discharge resources and transportation as appropriate   Arrange for interpreters to assist at discharge as needed  Note: Patient will discharge to home or other facility with appropriate resources. Will monitor and assess. Problem: Pain  Goal: Verbalizes/displays adequate comfort level or baseline comfort level  5/25/2022 0010 by Faustino Mobley RN  Outcome: Progressing  Flowsheets (Taken 5/25/2022 0010)  Verbalizes/displays adequate comfort level or baseline comfort level: Encourage patient to monitor pain and request assistance  5/24/2022 1524 by Eddie Wagner RN  Outcome: Progressing  Note: Patient verbalizes/displays adequate comfort level or baseline comfort level. Will monitor and assess.

## 2022-05-25 NOTE — OP NOTE
[address] [facility]                                OPERATIVE REPORT    PATIENT NAME: Nikita Adams                    :        1950  MED REC NO:   [id number]                          ROOM:       3122  ACCOUNT NO:   [de-identified]                           ADMIT DATE: 2022  PROVIDER:     Verónica Olea MD    DATE OF PROCEDURE:  2022    PREOPERATIVE DIAGNOSIS:  Cauda equina syndrome. POSTOPERATIVE DIAGNOSIS:  Cauda equina syndrome. OPERATIONS PERFORMED:  1. Urgent decompressive lumbar laminectomy of L3, L4, L5.  2.  Multiple medial facetectomies and foraminotomies. 3.  Use of microscope. 4.  Physician-directed and interpreted intraoperative fluoroscopy. SURGEON:  Verónica Olea MD    ANESTHESIA:  General endotracheal.    COMPLICATIONS:  None apparent. ESTIMATED BLOOD LOSS:  240 mL. 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 He presented to the emergency room. He was ambulatory  and continent. MRI imaging showed severe canal stenosis at L3-L4 and  L4-L5 with obliteration of the CSF signal at L4-L5 and elected to  undergo decompression. OPERATIVE PROCEDURE:  After obtaining informed consent, he was taken to  the operating suite on 2022. General endotracheal intubation was  initiated. He was positioned prone onto a Stalin table with care taken  to pad pressure points. Antibiotics were administered. Level was  localized intraoperatively with fluoroscopy. A midline lumbar incision  was planned. He was prepped and draped in a typical surgical fashion. Skin was infiltrated with Marcaine and epinephrine. Scalpel was used to  incise the skin. Bovie was used to dissect subcutaneous tissues to  thoracolumbar fascia.   Thoracolumbar fascia was incised midline with  Bovie, and bilateral subperiosteal dissections were performed to expose  the lamina of L3 to L5 bilaterally. Self-retaining retractors were  placed. Levels were confirmed intraoperatively with instrument and  fluoroscopy. 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 Svetlana Norris was then used to resect the spinous processes of L3,  L4, and L5. A drill was then used to drill down the bone of L3, L4, and  L5 lamina. There was shingling of the lamina of S1 into L5, L5 into L4,  L4 into L3. Drill was used to drill down the lamina, L3, L4, and L5,  until some ligamentum flavum was visualized at the inferior aspects of  the lamina. The microscope was delivered. Under the microscope, a laminectomy of L5 was performed, bilateral,  complete with drill and Kerrisons #3 and 4. Marni Sides was used to develop a  plane between ligamentum flavum and bone and piecemeal resection of  lamina was performed. Fat was encountered midline. Nerve hook was used  to elevate the ligamentum flavum. Piecemeal resection of ligamentum  flavum at L5-S1 was performed. The dura was exposed. Marni Sides was used to  develop a plane between dura and adjacent tissue and #3 Kerrisons were  used to complete the laminectomy of L5. This was carried over to the  area of L4. It was severely stenosed; #2 and #3 Kerrisons were used to  do a piecemeal resection, laminectomy of L4. Robust hypertrophic  ligamentum flavum was encountered and Marni Sides was used to develop a plane  between the dura and the ligamentum flavum. This was elevated up with  the Marni Sides and piecemeal resection of ligamentum flavum was performed to  expose dura that still remained very stenotic due to overgrowth of  hypertrophic ligament in the lateral recesses.     Dissection and decompression was carried in a similar fashion up to L3  using #2 and #3 Kerrisons and Ferdinand and the dorsal aspect of the thecal  sac was decompressed from L3 to L5. Following this, multiple medial  facetectomies and foraminotomies were performed with drill and  Kerrisons. Lateral recesses were decompressed with Kerrisons. Multiple  foraminotomies were performed with #2 and #3 Kerrisons, and after  completion of this, the canal was decompressed from L3 to L5 and the  nerve roots were decompressed at these levels. There was no evidence of  CSF leak. FloSeal and electrocautery were used for hemostasis. Some  FloSeal was placed over the exposed dura after the microscope was taken  out. The retractors were removed. Bipolar electrocautery was used to  control muscle oozing and the incision was closed in multiple layers. Skin was closed. Dressing was applied. The patient was extubated and transferred to Recovery in stable  condition. There were no apparent complications. All counts were  correct. A time-out procedure was performed prior to incision. I was  present for the entire procedure.         Philippe Willams MD    D: 05/25/2022 11:21:06       T: 05/25/2022 11:23:32     [email or web address]  Job#: [id number]     Doc#: [id number]    CC:

## 2022-05-26 VITALS
WEIGHT: 201.72 LBS | DIASTOLIC BLOOD PRESSURE: 88 MMHG | OXYGEN SATURATION: 98 % | SYSTOLIC BLOOD PRESSURE: 126 MMHG | TEMPERATURE: 97.3 F | HEIGHT: 71 IN | BODY MASS INDEX: 28.24 KG/M2 | HEART RATE: 68 BPM | RESPIRATION RATE: 16 BRPM

## 2022-05-26 LAB
CREAT SERPL-MCNC: 1 MG/DL (ref 0.8–1.3)
GFR AFRICAN AMERICAN: >60
GFR NON-AFRICAN AMERICAN: >60
URINE CULTURE, ROUTINE: NORMAL
VANCOMYCIN TROUGH: 12 UG/ML (ref 10–20)

## 2022-05-26 PROCEDURE — 97530 THERAPEUTIC ACTIVITIES: CPT

## 2022-05-26 PROCEDURE — 97116 GAIT TRAINING THERAPY: CPT

## 2022-05-26 PROCEDURE — 6370000000 HC RX 637 (ALT 250 FOR IP): Performed by: NEUROLOGICAL SURGERY

## 2022-05-26 PROCEDURE — 2580000003 HC RX 258: Performed by: NEUROLOGICAL SURGERY

## 2022-05-26 PROCEDURE — 80202 ASSAY OF VANCOMYCIN: CPT

## 2022-05-26 PROCEDURE — 36415 COLL VENOUS BLD VENIPUNCTURE: CPT

## 2022-05-26 PROCEDURE — 82565 ASSAY OF CREATININE: CPT

## 2022-05-26 PROCEDURE — 6360000002 HC RX W HCPCS: Performed by: NEUROLOGICAL SURGERY

## 2022-05-26 PROCEDURE — 97535 SELF CARE MNGMENT TRAINING: CPT

## 2022-05-26 RX ORDER — OXYCODONE HYDROCHLORIDE 5 MG/1
5-10 TABLET ORAL
Qty: 50 TABLET | Refills: 0 | Status: SHIPPED | OUTPATIENT
Start: 2022-05-26 | End: 2022-06-02

## 2022-05-26 RX ORDER — METHOCARBAMOL 750 MG/1
750 TABLET, FILM COATED ORAL 4 TIMES DAILY
Qty: 120 TABLET | Refills: 0 | Status: SHIPPED | OUTPATIENT
Start: 2022-05-26 | End: 2022-06-25

## 2022-05-26 RX ADMIN — METFORMIN HYDROCHLORIDE 1000 MG: 500 TABLET ORAL at 08:27

## 2022-05-26 RX ADMIN — Medication 10 ML: at 08:30

## 2022-05-26 RX ADMIN — POLYETHYLENE GLYCOL 3350 17 G: 17 POWDER, FOR SOLUTION ORAL at 08:30

## 2022-05-26 RX ADMIN — AMIODARONE HYDROCHLORIDE 200 MG: 200 TABLET ORAL at 08:27

## 2022-05-26 RX ADMIN — PANTOPRAZOLE SODIUM 40 MG: 40 TABLET, DELAYED RELEASE ORAL at 08:29

## 2022-05-26 RX ADMIN — OXYCODONE 5 MG: 5 TABLET ORAL at 08:27

## 2022-05-26 RX ADMIN — Medication 1500 MG: at 01:23

## 2022-05-26 RX ADMIN — LOSARTAN POTASSIUM 25 MG: 25 TABLET, FILM COATED ORAL at 08:27

## 2022-05-26 RX ADMIN — METHOCARBAMOL 750 MG: 750 TABLET ORAL at 12:14

## 2022-05-26 RX ADMIN — GLIPIZIDE 10 MG: 5 TABLET ORAL at 09:54

## 2022-05-26 RX ADMIN — METHOCARBAMOL 750 MG: 750 TABLET ORAL at 08:29

## 2022-05-26 RX ADMIN — ALOGLIPTIN 25 MG: 25 TABLET, FILM COATED ORAL at 08:27

## 2022-05-26 ASSESSMENT — PAIN DESCRIPTION - DESCRIPTORS
DESCRIPTORS: ACHING
DESCRIPTORS: ACHING

## 2022-05-26 ASSESSMENT — PAIN DESCRIPTION - ORIENTATION
ORIENTATION: LOWER
ORIENTATION: LOWER

## 2022-05-26 ASSESSMENT — PAIN SCALES - GENERAL
PAINLEVEL_OUTOF10: 4
PAINLEVEL_OUTOF10: 2

## 2022-05-26 ASSESSMENT — PAIN DESCRIPTION - LOCATION
LOCATION: BACK
LOCATION: BACK

## 2022-05-26 ASSESSMENT — PAIN DESCRIPTION - PAIN TYPE
TYPE: SURGICAL PAIN
TYPE: SURGICAL PAIN

## 2022-05-26 ASSESSMENT — PAIN DESCRIPTION - ONSET
ONSET: ON-GOING
ONSET: ON-GOING

## 2022-05-26 ASSESSMENT — PAIN DESCRIPTION - FREQUENCY
FREQUENCY: CONTINUOUS
FREQUENCY: CONTINUOUS

## 2022-05-26 ASSESSMENT — PAIN - FUNCTIONAL ASSESSMENT
PAIN_FUNCTIONAL_ASSESSMENT: PREVENTS OR INTERFERES SOME ACTIVE ACTIVITIES AND ADLS
PAIN_FUNCTIONAL_ASSESSMENT: PREVENTS OR INTERFERES SOME ACTIVE ACTIVITIES AND ADLS

## 2022-05-26 NOTE — DISCHARGE INSTR - ACTIVITY
Follow the discharge instructions from [facility] and the instruction from PT and OT.

## 2022-05-26 NOTE — PLAN OF CARE
Problem: Discharge Planning  Goal: Discharge to home or other facility with appropriate resources  Outcome: Progressing  Flowsheets (Taken 5/25/2022 2115)  Discharge to home or other facility with appropriate resources: Identify barriers to discharge with patient and caregiver     Problem: Pain  Goal: Verbalizes/displays adequate comfort level or baseline comfort level  Outcome: Progressing  Flowsheets (Taken 5/25/2022 2155)  Verbalizes/displays adequate comfort level or baseline comfort level: Encourage patient to monitor pain and request assistance

## 2022-05-26 NOTE — PROGRESS NOTES
Physical Therapy  Facility/Department: [address] ORTHOPEDICS  Physical Therapy Initial Assessment    Name: Dr. Angelia Dunn Harris: 1950  MRN: [id number]  Date of Service: 5/26/2022    Assessment / Discharge Recommendations:  -progressing well and ready for discharge to home today  -recommended he use the rolling walker initially to help assure safety with ambulation in home  -states he has rolling walker at home and his wife will retrieve from basement for home to use  -he voices no concerns with discharge to home   -reminded of no bending lifting or twisting until cleared by Neurosurgery    Patient Diagnosis(es): The primary encounter diagnosis was Numbness and tingling. Diagnoses of Paresthesias, Lumbar disc disease with radiculopathy, and Cauda equina syndrome not affecting bladder ([facility] [address] were also pertinent to this visit. Past Medical History:  has a past medical history of Cancer ([facility] [address] [facility] Diabetes mellitus ([facility] [address] and Fracture cervical vertebra-closed ([facility] [address] Past Surgical History:  has a past surgical history that includes Tonsillectomy; hernia repair; Mohs surgery (10/02/2018); Colonoscopy (N/A, 1/22/2021); and Lumbar spine surgery (N/A, 5/25/2022). Activity Tolerance  Activity Tolerance: Patient tolerated treatment well     Plan   Plan  Current Treatment Recommendations: Functional mobility training,Transfer training,ADL/Self-care training,Gait training,Stair training,Positioning,Patient/Caregiver education & training  Plan Comment: qdx1-2 then 3-5 while on acute floor  Safety Devices  Type of Devices: Call light within reach,Chair alarm in place,Left in chair     Restrictions  Restrictions/Precautions  Restrictions/Precautions: Fall Risk  Position Activity Restriction  Spinal Precautions: No Bending,No Lifting,No Twisting  Other position/activity restrictions: Velez catheter and IV     Subjective   General  Chart Reviewed:  Yes  Additional Pertinent Hx: here due to cauda equina necessitating urgent decompressive laminectomy    PMH includes lumbar DDD and cervical fracture  Response To Previous Treatment: Patient with no complaints from previous session. Family / Caregiver Present: No  Follows Commands: Within Functional Limits  Subjective  Subjective: patient seen in tandem of Neurosurgery nurse Bryan Guna) while beginning to ambulate from room using rolling walker - agreeable to PT to observe gait and stability while using rolling walker  - overall he states he is pleased with initial improvement with sensation and motor power         Social/Functional History  Social/Functional History  Lives With: Spouse  Type of Home: House  Home Layout: One level  Home Access: Stairs to enter without rails  Entrance Stairs - Number of Steps: 1  Bathroom Shower/Tub: Walk-in shower  Bathroom Toilet: Standard (vanity near)  Bathroom Equipment: Built-in shower seat,Toilet raiser  Home Equipment: Cane (plans to borrow RW from [employer])  Has the patient had two or more falls in the past year or any fall with injury in the past year?: No  ADL Assistance: Independent  Homemaking Assistance: Independent  Ambulation Assistance: Independent (cane last ~1 weeks with mild unsteadiness (d/t significant numbness in BLE))  Transfer Assistance: Independent  Occupation: Part time employment  Type of Occupation: Chiropractor  Leisure & Hobbies: motorcycles. Planes.  Horses  Objective   Bed mobility  Bed Mobility Comments: not observed at this session  Transfers  Stand to sit: Supervision  Ambulation  Surface: level tile  Device: Rolling Walker  Assistance: Modified Independent;Supervision  Quality of Gait: step through pattern with adequate base of support and symmetric step length  Distance: in hallway for >200 feet  Comments: overall stable with walker   - ambulated a short distance without walker and step length reduced and pace slowed considerably  Stairs/Curb  Stairs?: No  Balance  Comments: steady with stand to sit and while ambulating    AM-PAC Score  AM-PAC Inpatient Mobility Raw Score : 17 (05/25/22 1330)  AM-PAC Inpatient T-Scale Score : 42.13 (05/25/22 1330)  Mobility Inpatient CMS 0-100% Score: 50.57 (05/25/22 1330)  Mobility Inpatient CMS G-Code Modifier : CK (05/25/22 1330)    Goals  Short Term Goals  Time Frame for Short term goals: 1-2 days  Short term goal 1: bed mobility at sba in log roll fashion  Short term goal 2: transfers at supervision  Short term goal 3: ambulation at supervision with AD as needed for safety and effectiveness for household distances     -steps as needed for home entry at [facility]  Patient Goals   Patient goals : recovery of normal strength and sensation       Education  Patient Education  Education Given To: Patient  Education Provided: Precautions  Education Method: Verbal  Barriers to Learning: None  Education Outcome: Verbalized understanding      Therapy Time   Individual Concurrent Group Co-treatment   Time In 1040         Time Out 1105         Minutes 25                 [address] Aileen Martell, PT

## 2022-05-26 NOTE — DISCHARGE INSTR - COC
Continuity of Care Form    Patient Name: Dr. Trisha Castro Dark:  1950  MRN:  [id number]    Admit date:  5/24/2022  Discharge date:  ***    Code Status Order: Full Code   Advance Directives:   [place] Directive Type of Healthcare Directive Copy in [address] Agent's Name Healthcare Agent's Phone Number    05/25/22 6689 Yes, patient has an advance directive for healthcare treatment Durable power of  for health care;Living will No, copy requested from family -- -- --    05/25/22 0620 Yes, patient has an advance directive for healthcare treatment Durable power of  for health care;Living will No, copy requested from family -- -- --            Admitting Physician:  Meryl Odell MD  PCP: Barbra Langley MD    Discharging Nurse: [facility] Unit/Room#: U2G-2966/3122-01  Discharging Unit Phone Number: ***    Emergency Contact:   Extended Emergency Contact Information  Primary Emergency Contact: Bleser,Denise  Address: [address] [place] [address]  Home Phone: [phone]  Mobile Phone: [phone]  Relation: Spouse   needed?  No    Past Surgical History:  Past Surgical History:   Procedure Laterality Date    COLONOSCOPY N/A 1/22/2021    COLONOSCOPY WITH BIOPSY performed by Daisy Romero MD at [address] [place] [address] N/A 5/25/2022    DECOMPRESSIVE LUMBAR LAMINECTOMY L3, L4, L5 performed by Meryl Odell MD at [place] [address]  10/02/2018    right nasal tip    TONSILLECTOMY         Immunization History:   Immunization History   Administered Date(s) Administered    COVID-19, Omar Banister, Primary or Immunocompromised, PF, 100mcg/0.5mL 01/15/2021, 02/12/2021    Pneumococcal Conjugate 13-valent (Gyxowxg53) 01/12/2017    Pneumococcal Polysaccharide (Penpinurh51) 03/08/2019    Tdap (Boostrix, Adacel) 11/25/2020 Zoster Live (Zostavax) 09/11/2014    Zoster Recombinant (Shingrix) 11/25/2020       Active Problems:  Patient Active Problem List   Diagnosis Code    Cervical spine fracture (HCC) S12. 9XXA    HTN (hypertension) I10    History of nephrolithiasis Z87.442    History of basal cell carcinoma Z85.828    History of prostatitis Z87.438    Type 2 diabetes mellitus with complication, without long-term current use of insulin (HCC) E11.8    Subclinical hypothyroidism E03.8    Other male erectile dysfunction N52.8    Rosacea L71.9    Basal cell carcinoma of left cheek C44.319    DDD (degenerative disc disease), cervical M50.30    History of colonoscopy with polypectomy - 2014, 202, Q5 yr Z98.890, Z86.010    Cauda equina syndrome not affecting bladder (HCC) G83.4       Isolation/Infection:   Isolation            No Isolation          Patient Infection Status       None to display            Nurse Assessment:  Last Vital Signs: /88   Pulse 68   Temp 97.3 °F (36.3 °C) (Oral)   Resp 16   Ht 5' 11\" (1.803 m)   Wt 201 lb 11.5 oz (91.5 kg)   SpO2 98%   BMI 28.13 kg/m²     Last documented pain score (0-10 scale): Pain Level: 0  Last Weight:   Wt Readings from Last 1 Encounters:   05/26/22 201 lb 11.5 oz (91.5 kg)     Mental Status:  {IP PT MENTAL STATUS:20030}    IV Access:  { CYNDEE IV ACCESS:643272978}    Nursing Mobility/ADLs:  Walking   {P DME YZON:574839813}  Transfer  {P DME YAQL:622270154}  Bathing  {P DME XGSK:053152373}  Dressing  {P DME LLHK:080395097}  Toileting  {P DME QXOJ:625304508}  Feeding  {P DME XVOM:832427939}  Med Admin  {P DME YIZO:444006017}  Med Delivery   { CYNDEE MED Delivery:716029214}    Wound Care Documentation and Therapy:  Incision 05/25/22 Back Lower;Medial (Active)   Dressing Status Clean;Dry; Intact 05/25/22 2115   Incision Cleansed Cleansed with saline 05/25/22 1034   Dressing/Treatment Dry dressing 05/25/22 2115   Closure Other (Comment) 05/25/22 2115   Margins Other (Comment) Address:  Phone:  Fax:    Dialysis Facility (if applicable)   Name:  Address:  Dialysis Schedule:  Phone:  Fax:    / signature: {Esignature:145515773}    PHYSICIAN SECTION    Prognosis: {Prognosis:0987358278}    Condition at Discharge: Luke Blanco Patient Condition:222294002}    Rehab Potential (if transferring to Rehab): {Prognosis:3119936255}    Recommended Labs or Other Treatments After Discharge: ***    Physician Certification: I certify the above information and transfer of Luna Maguire Dr.  is necessary for the continuing treatment of the diagnosis listed and that he requires {Admit to Appropriate Level of Care:46375} for {GREATER/LESS:476131013} 30 days.      Update Admission H&P: {CHP DME Changes in SOSVT:689767377}    PHYSICIAN SIGNATURE:  Electronically signed by Imelda Bisohp MD on 5/26/22 at 7:48 AM EDT

## 2022-05-26 NOTE — PROGRESS NOTES
Data- discharge order received, pt verbalized agreement to discharge, disposition to previous residence, no needs for HHC/DME. Action- discharge instructions prepared and given to Mr Peggie Oppenheim, pt verbalized understanding. Medication information packet given r/t NEW and/or CHANGED prescriptions emphasizing name/purpose/side effects, pt verbalized understanding. Discharge instruction summary: Diet- regular, Activity- patient to follow the directions for activity given to him by [facility] in his yellow folerd, PT & OT. Primary Care Physician as follows: Marj Rust [phone] f/u appointment already made with [facility] in 2 and 4 weeks, prescription medications filled by patient. Inpatient surgical procedure precautions reviewed: lumbar lamectomy    Response- Pt belongings gathered, IV removed. Disposition is home (no HHC/DME needs), transported with his wife, taken to lobby via w/c w/ [facility] Sony[facility], no complications.

## 2022-05-26 NOTE — DISCHARGE SUMMARY
Discharge Summary    Date of Admission: 5/24/2022 10:05 AM  Date of Discharge:  5/26/22  Admission Diagnosis: Cauda equina syndrome  Patient Active Problem List   Diagnosis    Cervical spine fracture (HCC)    HTN (hypertension)    History of nephrolithiasis    History of basal cell carcinoma    History of prostatitis    Type 2 diabetes mellitus with complication, without long-term current use of insulin ([facility] [address] [facility]    Subclinical hypothyroidism    Other male erectile dysfunction    Rosacea    Basal cell carcinoma of left cheek    DDD (degenerative disc disease), cervical    History of colonoscopy with polypectomy - 2014, 202, Q5 yr    Cauda equina syndrome not affecting bladder ([facility] [address]     Discharge Diagnosis: Same   Condition on Discharge: excellent  Attending for Admission: Raz Lew MD  Procedures:   DECOMPRESSIVE LUMBAR LAMINECTOMY L3, L4, L5 with multiple facetectomies and foraminotomies    Reason for Admission: 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 He had weakness in the hip flexors (had to pick the right leg up to get it over his motorcycle), soles of the feet hypersensitive and reported his gait was \"off\" for a long time. He underwent the procedure listed above on 5/25/22. There were no complications. Hospital Course: After surgery, his pre-operative numbness in the saddle area was nearly 100% resolved, and the lower extremity numbness was improved down to the ankle. No painful sensations to touch in the soles of the feet. He complained of post op surgical site pain. The pain was well-controlled on oral medications, occasional Oxycodone and scheduled Robaxin. Hip flexor weakness Rt 4-/5, Lt 4/5, otherwise strength is 5/5  Sensation diminished to touch in the lower legs and feet, but said to be improved. No brace. The dressing was clean, dry and intact. There was no erythema or edema around the surgical site. Sutures intact. Prior to discharge He was eating well, urinating and ambulating with a steady gait with rolling walker in the bearden with staff and completed the acute goals of PT/OT. More stable with the walker. Using incentive spirometer with TV 2500 ml. No cough. Ice therapy is helpful. Discharge Vitals/Labs: /88   Pulse 68   Temp 97.3 °F (36.3 °C) (Oral)   Resp 16   Ht 5' 11\" (1.803 m)   Wt 201 lb 11.5 oz (91.5 kg)   SpO2 98%   BMI 28.13 kg/m²   Glucose 178. Discharge Medications:      Medication List      START taking these medications    methocarbamol 750 MG tablet  Commonly known as: ROBAXIN  Take 1 tablet by mouth 4 times daily     oxyCODONE 5 MG immediate release tablet  Commonly known as: ROXICODONE  Take 1-2 tablets by mouth every 4-6 hours as needed for Pain for up to 7 days. CHANGE how you take these medications    Janumet  MG per tablet  Generic drug: sitaGLIPtan-metFORMIN  TAKE 1 TABLET BY MOUTH TWICE DAILY WITH MEALS  What changed: See the new instructions. CONTINUE taking these medications    acetaminophen 325 mg tablet  Commonly known as: TYLENOL     amiodarone 200 MG tablet  Commonly known as: CORDARONE     ascorbic acid 250 MG tablet  Commonly known as: VITAMIN C     atorvastatin 40 MG tablet  Commonly known as: LIPITOR  Take 1 tablet by mouth once daily     Biotin 1000 MCG Chew     glipiZIDE 10 MG tablet  Commonly known as: GLUCOTROL  TAKE 1 TABLET BY MOUTH TWICE DAILY BEFORE MEAL(S)     Jardiance 25 MG tablet  Generic drug: empagliflozin  Take 1 tablet by mouth once daily     losartan 25 MG tablet  Commonly known as: COZAAR  Take 1 tablet by mouth once daily     mometasone 0.1 % cream  Commonly known as: ELOCON  Apply to affected areas of the skin twice daily for up 2 weeks or until improved. For dermatitis.      niacin 500 MG tablet     pantoprazole 40 MG tablet  Commonly known as: PROTONIX     tadalafil 5 MG tablet  Commonly known as: CIALIS  TAKE 1 TABLET BY MOUTH ONCE DAILY AS NEEDED FOR ERECTILE DYSFUNCTION     vitamin D-3 125 MCG (5000 UT) Tabs  Commonly known as: cholecalciferol     vitamin E 1000 units capsule     zinc gluconate 50 MG tablet        STOP taking these medications    dexamethasone tablet  Commonly known as: DECADRON     Eliquis 5 MG Tabs tablet  Generic drug: apixaban           Where to Get Your Medications      You can get these medications from any pharmacy    Bring a paper prescription for each of these medications  · methocarbamol 750 MG tablet  · oxyCODONE 5 MG immediate release tablet       Discharge Destination: The patient was discharged to Home. Follow-up: The patient is to follow-up with Dr. Arlette Joe office in 2 weeks for suture removal and again in 4 weeks without xrays. Discharge Instructions: Verbal and written discharge instructions as well as dressing change instructions were given to the patient at the time of consent and reviewed prior to discharge. No NSAIDS or anticoagulation for 1 week post-operatively. No driving or riding in a motor vehicle. No lifting or bending. All questions answered at the bedside. Patient was seen by Dr. Emmie Villalobos prior to discharge.       Electronically signed by Reagan Chueng RN on 5/26/22 at 10:54 AM EDT

## 2022-05-26 NOTE — DISCHARGE INSTR - DIET

## 2022-05-26 NOTE — PROGRESS NOTES
Occupational Therapy  Facility/Department: [address] ORTHOPEDICS  Occupational Daily Treatment Note      Name: Dr. Elias Torrezkerry Roberto: 1950  MRN: [id number]  Date of Service: 5/26/2022    Discharge Recommendations:  24 hour supervision or assist          Patient Diagnosis(es): The primary encounter diagnosis was Numbness and tingling. Diagnoses of Paresthesias, Lumbar disc disease with radiculopathy, and Cauda equina syndrome not affecting bladder ([address] were also pertinent to this visit. Past Medical History:  has a past medical history of Cancer ([facility] [address] Diabetes mellitus ([facility] [address] and Fracture cervical vertebra-closed ([facility] [address] Past Surgical History:  has a past surgical history that includes Tonsillectomy; hernia repair; Mohs surgery (10/02/2018); Colonoscopy (N/A, 1/22/2021); and Lumbar spine surgery (N/A, 5/25/2022). Treatment Diagnosis: impaired ADL/fxl mobility      Assessment   Performance deficits / Impairments: Decreased functional mobility ; Decreased endurance;Decreased ADL status; Decreased high-level IADLs;Decreased sensation;Decreased balance  Assessment: Dicussed with OTR am pac score is 18. Anticipate that jessica will be able to return home with wife for 24/7 supervision/assist. Patient able to complete sit<>stand from recliner chair to commode to recliner chair with mild cues for hand placement. Functional mobility with RW with SBA, slow steady gait with no overt LOB noted. Stood with SBA to complete grooming tasks. Patient given long sponge, reacher and long shoe horn to increasd [place] with ADL tasks. Cont with POC.   REQUIRES OT FOLLOW-UP: Yes  Activity Tolerance  Activity Tolerance: Patient Tolerated treatment well        Plan   Plan  Times per Week: 2-3 sessions  Specific Instructions for Next Treatment: LB AE  Current Treatment Recommendations: Strengthening,ROM,Balance training,Functional mobility training,Endurance training,Pain management,Safety education & training,Positioning,Modalities,Self-Care / ADL,Home management training     Restrictions  Restrictions/Precautions  Restrictions/Precautions: Fall Risk  Position Activity Restriction  Spinal Precautions: No Bending,No Lifting,No Twisting  Other position/activity restrictions: Velez catheter and IV    Subjective   General  Chart Reviewed: Yes  Patient assessed for rehabilitation services?: Yes  Additional Pertinent Hx: 71 yo male admitted for cauda equina syndrome requiring s/p 5/24 Urgent decompressive lumbar laminectomy of L3, L4, L5. PMH: HNT, cervical spine fx, DM, DDD, Rosacea,  Response to previous treatment: Patient with no complaints from previous session  Family / Caregiver Present: No  Referring Practitioner: Tonya Barajas MD  Diagnosis: cauda equina syndrome  Subjective  Subjective: Patient seated in recliner chair upon arrival to room. Patient agreeable to therapy. Patient reports mild pain with sit<>stand  General Comment  Comments: RN ok to see     Social/Functional History  Social/Functional History  Lives With: Spouse  Type of Home: House  Home Layout: One level  Home Access: Stairs to enter without rails  Entrance Stairs - Number of Steps: 1  Bathroom Shower/Tub: Walk-in shower  Bathroom Toilet: Standard (vanity near)  Bathroom Equipment: Built-in shower seat,Toilet raiser  Home Equipment: Cane (plans to borrow RW from [employer])  Has the patient had two or more falls in the past year or any fall with injury in the past year?: No  ADL Assistance: Independent  Homemaking Assistance: Independent  Ambulation Assistance: Independent (cane last ~1 weeks with mild unsteadiness (d/t significant numbness in BLE))  Transfer Assistance: Independent  Occupation: Part time employment  Type of Occupation: Chiropractor  Leisure & Hobbies: motorcycles. Planes. Horses       Objective         Safety Devices  Type of Devices: Nurse notified; Left in chair;Patient at risk for falls;Call light within reach; Chair alarm in place  Balance  Sitting: Intact  Standing: With support (SBA standing in front of sink for grooming tasks)  Toilet Transfers  Toilet - Technique: Ambulating  Equipment Used: Grab bars  Toilet Transfer: Stand by assistance     ADL  Equipment Provided: Reacher;Long-handled shoe horn;Long-handled sponge  Grooming: Setup;Stand by assistance  Grooming Skilled Clinical Factors: Standing in front of sink to wash face, hands, brush teeth and hair  Toileting: Stand by assistance  Toileting Skilled Clinical Factors: able to manage gown  Additional Comments: Patient given long sponge, long shoe horn and reacher to increase [place] with ADL tasks        Bed mobility  Supine to Sit: Unable to assess  Sit to Supine: Unable to assess  Bed Mobility Comments: up in chair at start and end of session  Transfers  Sit to stand: Stand by assistance  Stand to sit: Stand by assistance  Transfer Comments: SBA for sit<>stand from recliner chair to RW commode to recliner chair with mild cues for hand placement. Functional mobility with RW with SBA, slow steady gait with no overt LOB noted     Cognition  Overall Cognitive Status: WFL                  Education Given To: Patient  Education Provided: Role of Therapy;Plan of Care;Transfer Training;Precautions; ADL Adaptive Strategies; Equipment  Education Provided Comments: spinal precautions       AM-PAC Score        AM-[facility] Inpatient Daily Activity Raw Score: 18 (05/26/22 0950)  AM-PAC Inpatient ADL T-Scale Score : 38.66 (05/26/22 0950)  ADL Inpatient CMS 0-100% Score: 46.65 (05/26/22 0950)  ADL Inpatient CMS G-Code Modifier : CK (05/26/22 0950)    Goals  Short Term Goals  Time Frame for Short term goals: prior to d/c: all goals ongoing  Short Term Goal 1: toileting SUP  Short Term Goal 2: LB dressing with AE SUP  Short Term Goal 3: UB dressing set up  Short Term Goal 4: tolerate 5 min fx standing task SUP  Short Term Goal 5: fxl tx and mobility with RW household distances SUP  Patient Goals   Patient goals : \"get back to my hobbies\"       Therapy Time   Individual Concurrent Group Co-treatment   Time In 0810         Time Out 0910         Minutes 60               Electronically signed by oJse E Yang, [id number] on 5/26/2022 at 10:01 AM      If discharged prior to next OT session please see last daily note for discharge status

## 2022-05-26 NOTE — PROGRESS NOTES
Pt resting in bed. A&Ox4. Pt c/o lower back pain once overnight, oxycodone given per orders. No other complaints at this time. Fall precautions in place, call light and bedside table within reach.

## 2022-05-27 ENCOUNTER — CARE COORDINATION (OUTPATIENT)
Dept: CASE MANAGEMENT | Age: 72
End: 2022-05-27

## 2022-05-27 DIAGNOSIS — G83.4 CAUDA EQUINA SYNDROME NOT AFFECTING BLADDER (HCC): Primary | ICD-10-CM

## 2022-05-27 PROCEDURE — 1111F DSCHRG MED/CURRENT MED MERGE: CPT | Performed by: FAMILY MEDICINE

## 2022-05-27 NOTE — CARE COORDINATION
[facility] Transitions Initial Follow Up Call    Call within 2 business days of discharge: Yes    Patient: Corinne Brito Dr. Patient : 1950   MRN: [id number]  Reason for Admission: Cauda equina syndrome not affecting bladder Discharge Date: 22 RARS: Readmission Risk Score: 12.5 ( )      Last Discharge [facility]       Complaint Diagnosis Description Type Department Provider    22 Numbness Numbness and tingling . .. ED to Hosp-Admission (Discharged) (ADMIT) Rod Dave MD           Spoke with: Corinne Brito Dr. who reports that he was doing great yesterday but this morning it felt like he got kicked in the back by a Nasim. Patient reports that since he has taking he pain medication and his pain level is now decreasing. Patient states that incision site per nurse yesterday looks good. He states that wife will check and change dressing later today. Patient  this am. Patient denies cp, sob, cough, dizziness, headache, n/v, diarrhea, abdominal pains, fever, or chills. Patient report that appetite and fluid intake is good and denies any problems with bowel or bladder. Patient states that he has not had a BM as of yet but he is passing a lot gas and do not feel bloated. Patient states that his the numbness in his groin and feet is getting better but going slower in the feet. He states that he is using a walker with ambulation. Patient is taking all medications as ordered. Writer and patient did a complete medication review and 1111f was completed. Writer and patient reviewed nutrition, the importance of deep breathing exercises. Patient received a call from Dr Wendie Lobo and had to end our call. Patient instructed to continue to monitor s/s, reporting any that may present to MD immediately for early intervention. Reminded of COVID 19 precautions. Agreeable to f/u calls.       Facility: [facility]     Non-face-to-face services provided:  Obtained and reviewed discharge summary and/or continuity of care documents  Education of patient/family/caregiver/guardian to support self-management-s/s of wound infection and Covid and when to contact the doctor. Assessment and support for treatment adherence and medication management-.     Transitions of Care Initial Call    Was this an external facility discharge? No Discharge Facility: [facility] to be reviewed by the provider   Additional needs identified to be addressed with provider: No  none             Method of communication with provider : none    Advance Care Planning:   Does patient have an Advance Directive: reviewed and current. Advance Care Planning   Healthcare Decision Maker:    Primary Decision Maker: Campos Cleaves - [phone]       LPN Care Coordinator contacted the patient by telephone to perform post hospital discharge assessment. Verified name and  with patient as identifiers. Provided introduction to self, and explanation of the LPN CC role. LPN CC reviewed discharge instructions, medical action plan and red flags with patient who verbalized understanding. Patient given an opportunity to ask questions and does not have any further questions or concerns at this time. Were discharge instructions available to patient? Yes. Reviewed appropriate site of care based on symptoms and resources available to patient including: PCP  Specialist  When to call 911. The patient agrees to contact the PCP office for questions related to their healthcare. Medication reconciliation was performed with patient, who verbalizes understanding of administration of home medications. Advised obtaining a 90-day supply of all daily and as-needed medications. Was patient discharged with a pulse oximeter? no    LPN CC provided contact information. Plan for follow-up call in 5-7 days based on severity of symptoms and risk factors.   Plan for next call: symptom management-pain, incision health  follow up [address] 24 Hour Call    Do you have a copy of your discharge instructions?: Yes  Do you have all of your prescriptions and are they filled?: Yes  Have you been contacted by a [address]?: No  Have you scheduled your follow up appointment?: Yes  How are you going to get to your appointment?: Car - family or friend to transport  Do you feel like you have everything you need to keep you well at home?: Yes  Care Transitions Interventions  No Identified Needs         Follow Up  Future Appointments   Date Time Provider Alejandro Rodríguez   10/5/2022  7:45 AM MD uHi Marin LPN

## 2022-05-27 NOTE — ADT AUTH CERT
Utilization Reviews         Neurology [address] Day 2 (5/25/2022) by Mathew Guidry RN       Review Status Review Entered   Completed 5/27/2022 13:18      Criteria Review      Care Day: 2 Care Date: 5/25/2022 Level of Care: Inpatient Floor    Guideline Day 2    Clinical Status    (X) * No ICU or intermediate care needs    5/27/2022 1:18 PM EDT by Andrea Mata      05/25/22 1115 97.6 (36.4) 16 84 153/84 -- Supine -- 2 97 Nasal cannula   05/25/22 1918 97.6 (36.4) 16 124 122/76 -- Left side -- -- 97 None (Room air)    Interventions    (X) Inpatient interventions continue    5/27/2022 1:18 PM EDT by Andrea Mata      ANCEF IV  VANCO IV  IVF AT 76    * Milestone   Additional Notes   DATE: 5/25      Pertinent Updates:   DECOMPRESSIVE LUMBAR LAMINECTOMY L3, L4, L5  TODAY   IVF, IV abx      Vitals:   05/25/22 1115 97.6 (36.4) 16 84 153/84 - Supine - 2 97 Nasal cannula    05/25/22 1918 97.6 (36.4) 16 124 122/76 - Left side - - 97 None (Room air)       MD Consults/Assessments & Plans:   NSGY OP NOTE:   DATE OF PROCEDURE:  05/25/2022       PREOPERATIVE DIAGNOSIS:  Cauda equina syndrome.       POSTOPERATIVE DIAGNOSIS:  Cauda equina syndrome.       OPERATIONS PERFORMED:   1.  Urgent decompressive lumbar laminectomy of L3, L4, L5.   2.  Multiple medial facetectomies and foraminotomies. 3.  Use of microscope. 4.  Physician-directed and interpreted intraoperative fluoroscopy.       Medications:      0.9 % sodium chloride infusion   Rate: 75 mL/hr Freq: CONTINUOUS Route: IV   Last Dose: Stopped (05/26/22 1146)   Start: 05/25/22 1245 End: 05/26/22 1444      vancomycin (VANCOCIN) 1500 mg in dextrose 5 % 250 mL IVPB   Dose: 1,500 mg   Freq: EVERY 12 HOURS Route: IV   Last Dose: Stopped (05/26/22 0253)   Start: 05/25/22 1400 End: 05/26/22 1444      ceFAZolin (ANCEF) 2000 mg in dextrose 5 % 100 mL IVPB   Dose: 2,000 mg   Freq: ON CALL TO O.R. Route: IV   Start: 05/25/22 0645 End: 05/25/22 6320        PA RECOMMENDATION FOR IP by Radha Dahl PAULO Schreiber       Review Status Review Entered   In Primary 5/25/2022 13:36      Criteria Review   We recommend that the following pt's current hospitalization under INPATIENT status is APPROPRIATE . Name: Dr. Angelika Malone: 1950   CSN: [id number]   INSURANCE: [facility] [employer]        Clinical summary Urgent decompressive lumbar laminectomy of L3, L4, L5.  2. Multiple medial facetectomies and foraminotomies. 3. Use of microscope.   4. Physician-directed and interpreted intraoperative fluoroscopy.      Vitals noted  Labs and Imaging   MCG criteria applies yes  Comments       This chart was reviewed at 12:01 PM 5/25/2022    [address] [facility] hospital   CELL : [phone]

## 2022-05-31 RX ORDER — SITAGLIPTIN AND METFORMIN HYDROCHLORIDE 1000; 50 MG/1; MG/1
TABLET, FILM COATED ORAL
Qty: 180 TABLET | Refills: 3 | Status: SHIPPED | OUTPATIENT
Start: 2022-05-31

## 2022-06-03 ENCOUNTER — CARE COORDINATION (OUTPATIENT)
Dept: CASE MANAGEMENT | Age: 72
End: 2022-06-03

## 2022-06-03 NOTE — CARE COORDINATION
[facility] Transitions Follow Up Call    6/3/2022    Patient: Colonel Jennifer Dr.  Patient : 1950   MRN: [id number]  Reason for Admission: Cauda equina syndrome   Discharge Date: 22 RARS: Readmission Risk Score: 12.5 ( )         Spoke with: Colonel Jennifer Dr.      Care Transitions Follow Up Call    Needs to be reviewed by the provider   Additional needs identified to be addressed with provider: No  none             Method of communication with provider : none      Care Transition Nurse contacted the patient by telephone to follow up after admission. Verified name and  with patient as identifiers. Addressed changes since last contact: healing  Discussed follow-up appointments. If no appointment was previously scheduled, appointment scheduling offered: No.   Is follow up appointment scheduled within 7 days of discharge? No.      CTN reviewed discharge instructions, medical action plan and red flags with patient and discussed any barriers to care and/or understanding of plan of care after discharge. Discussed appropriate site of care based on symptoms and resources available to patient including: PCP  Specialist. The patient agrees to contact the PCP office for questions related to their healthcare. Patients top risk factors for readmission: medical condition-. Interventions to address risk factors: Education of patient/family/caregiver/guardian to support self-management-healing      [facility] follow up appointment(s): surgeon     CTN provided contact information for future needs. Plan for follow-up call in 7-10 days based on severity of symptoms and risk factors. Plan for next call: symptom management-.  self management-. .            Care Transitions Subsequent and Final Call    Subsequent and Final Calls  Do you have any ongoing symptoms?: No  Have your medications changed?: No  Do you have any questions related to your medications?: No  Do you currently have any active services?: No  Do you have any needs or concerns that I can assist you with?: No  Identified Barriers: None  Care Transitions Interventions  Other Interventions: Follow Up  Future Appointments   Date Time Provider Alejandro Paizi   10/5/2022  7:45 AM MD Alvrao Copeland       Pt states he is feeling really well today. Denies any fever, chills, or s/s of infection. His dressing changes have no drainage to note. His stitches will be removed next Tuesday with surgeon nurse and he will follow up with surgeon 6/27. No incontinence issues and his sensation in groin to ankles have come back. He describes the numbness around the sock area. Regular bowel movements. No issues eating or drinking. Pt has been able to walk up to . 25 miles with a cane. No questions or concerns at this time. Will continue to follow.      FAN Akers, RN   Care Transition Nurse  Mobile: [phone]

## 2022-06-09 ENCOUNTER — CARE COORDINATION (OUTPATIENT)
Dept: CASE MANAGEMENT | Age: 72
End: 2022-06-09

## 2022-06-09 NOTE — CARE COORDINATION
[facility] Transitions Follow Up Call    2022    Patient: Darren Aguiar Dr.  Patient : 1950   MRN: [id number]  Reason for Admission: cauda equina syndrome  Discharge Date: 22 RARS: Readmission Risk Score: 12.5 ( )         Spoke with: NA    Attempted to reach patient via phone for transition call. VM left stating purpose of call along with my contact information requesting a return call. David Barnhart LPN [address] [place] [address]  Care Transitions  [phone]    Care Transitions Subsequent and Final Call    Subsequent and Final Calls  Care Transitions Interventions  Other Interventions:            Follow Up  Future Appointments   Date Time Provider Department Center   10/5/2022  7:45 AM MD Saeed Batres Patient R K[facility] Perla [address], LPN

## 2022-06-17 ENCOUNTER — CARE COORDINATION (OUTPATIENT)
Dept: CASE MANAGEMENT | Age: 72
End: 2022-06-17

## 2022-06-17 NOTE — CARE COORDINATION
[facility] Transitions Follow Up Call    2022    Patient: Alyssa Gonzalez Dr.  Patient : 1950   MRN: [id number]  Reason for Admission: cauda equina syndrome  Discharge Date: 22 RARS: Readmission Risk Score: 12.5 ( )    Attempted to reach pt for follow up call. Left message requesting call back. Care Transitions Subsequent and Final Call    Subsequent and Final Calls  Care Transitions Interventions  Other Interventions:            Follow Up  Future Appointments   Date Time Provider Alejandro Rodríguez   10/5/2022  7:45 AM MD Mina Fontana

## 2022-06-20 RX ORDER — ATORVASTATIN CALCIUM 40 MG/1
TABLET, FILM COATED ORAL
Qty: 90 TABLET | Refills: 0 | Status: SHIPPED | OUTPATIENT
Start: 2022-06-20 | End: 2022-09-08

## 2022-06-26 DIAGNOSIS — N52.8 OTHER MALE ERECTILE DYSFUNCTION: ICD-10-CM

## 2022-06-26 DIAGNOSIS — N40.1 BPH ASSOCIATED WITH NOCTURIA: ICD-10-CM

## 2022-06-26 DIAGNOSIS — R35.1 BPH ASSOCIATED WITH NOCTURIA: ICD-10-CM

## 2022-06-27 RX ORDER — TADALAFIL 5 MG/1
TABLET ORAL
Qty: 90 TABLET | Refills: 0 | Status: SHIPPED | OUTPATIENT
Start: 2022-06-27 | End: 2022-09-08

## 2022-07-19 RX ORDER — LOSARTAN POTASSIUM 25 MG/1
TABLET ORAL
Qty: 90 TABLET | Refills: 0 | Status: SHIPPED | OUTPATIENT
Start: 2022-07-19 | End: 2022-10-24

## 2022-08-02 DIAGNOSIS — E11.8 TYPE 2 DIABETES MELLITUS WITH COMPLICATION, WITHOUT LONG-TERM CURRENT USE OF INSULIN (HCC): ICD-10-CM

## 2022-08-02 RX ORDER — EMPAGLIFLOZIN 25 MG/1
TABLET, FILM COATED ORAL
Qty: 90 TABLET | Refills: 3 | Status: SHIPPED | OUTPATIENT
Start: 2022-08-02

## 2022-09-08 DIAGNOSIS — N40.1 BPH ASSOCIATED WITH NOCTURIA: ICD-10-CM

## 2022-09-08 DIAGNOSIS — R35.1 BPH ASSOCIATED WITH NOCTURIA: ICD-10-CM

## 2022-09-08 DIAGNOSIS — N52.8 OTHER MALE ERECTILE DYSFUNCTION: ICD-10-CM

## 2022-09-08 RX ORDER — TADALAFIL 5 MG/1
TABLET ORAL
Qty: 90 TABLET | Refills: 0 | Status: SHIPPED | OUTPATIENT
Start: 2022-09-08

## 2022-09-08 RX ORDER — ATORVASTATIN CALCIUM 40 MG/1
TABLET, FILM COATED ORAL
Qty: 90 TABLET | Refills: 0 | Status: SHIPPED | OUTPATIENT
Start: 2022-09-08

## 2022-09-26 ENCOUNTER — TELEPHONE (OUTPATIENT)
Dept: DERMATOLOGY | Age: 72
End: 2022-09-26

## 2022-09-26 RX ORDER — CIPROFLOXACIN 500 MG/1
TABLET, FILM COATED ORAL
Qty: 20 TABLET | Refills: 0 | Status: SHIPPED | OUTPATIENT
Start: 2022-09-26

## 2022-09-26 NOTE — TELEPHONE ENCOUNTER
Pt called in need to cancel his appt on Oct 5 wanted to see if possible he can schedule appt on Oct 19,26,27 the earliest the better wanted to see if he can get in at 7:45 am   Please advise   Thanks   C/b [phone]

## 2022-10-24 RX ORDER — LOSARTAN POTASSIUM 25 MG/1
TABLET ORAL
Qty: 90 TABLET | Refills: 1 | Status: SHIPPED | OUTPATIENT
Start: 2022-10-24

## 2022-10-26 ENCOUNTER — OFFICE VISIT (OUTPATIENT)
Dept: DERMATOLOGY | Age: 72
End: 2022-10-26
Payer: MEDICARE

## 2022-10-26 DIAGNOSIS — L81.4 SOLAR LENTIGINOSIS: ICD-10-CM

## 2022-10-26 DIAGNOSIS — L71.9 ROSACEA: ICD-10-CM

## 2022-10-26 DIAGNOSIS — Z85.828 HISTORY OF BASAL CELL CARCINOMA: ICD-10-CM

## 2022-10-26 DIAGNOSIS — L57.0 ACTINIC KERATOSIS: Primary | ICD-10-CM

## 2022-10-26 PROCEDURE — 17003 DESTRUCT PREMALG LES 2-14: CPT | Performed by: DERMATOLOGY

## 2022-10-26 PROCEDURE — 1036F TOBACCO NON-USER: CPT | Performed by: DERMATOLOGY

## 2022-10-26 PROCEDURE — 1123F ACP DISCUSS/DSCN MKR DOCD: CPT | Performed by: DERMATOLOGY

## 2022-10-26 PROCEDURE — 3017F COLORECTAL CA SCREEN DOC REV: CPT | Performed by: DERMATOLOGY

## 2022-10-26 PROCEDURE — G8417 CALC BMI ABV UP PARAM F/U: HCPCS | Performed by: DERMATOLOGY

## 2022-10-26 PROCEDURE — 99213 OFFICE O/P EST LOW 20 MIN: CPT | Performed by: DERMATOLOGY

## 2022-10-26 PROCEDURE — 17000 DESTRUCT PREMALG LESION: CPT | Performed by: DERMATOLOGY

## 2022-10-26 PROCEDURE — G8484 FLU IMMUNIZE NO ADMIN: HCPCS | Performed by: DERMATOLOGY

## 2022-10-26 PROCEDURE — G8427 DOCREV CUR MEDS BY ELIG CLIN: HCPCS | Performed by: DERMATOLOGY

## 2022-10-26 RX ORDER — FLUOROURACIL 50 MG/ML
SOLUTION TOPICAL
Qty: 10 ML | Refills: 0 | Status: SHIPPED | OUTPATIENT
Start: 2022-10-26

## 2022-10-26 RX ORDER — METRONIDAZOLE 7.5 MG/G
GEL TOPICAL
Qty: 45 G | Refills: 2 | Status: SHIPPED | OUTPATIENT
Start: 2022-10-26

## 2022-10-26 RX ORDER — METOPROLOL SUCCINATE 25 MG/1
TABLET, EXTENDED RELEASE ORAL
COMMUNITY
Start: 2022-09-01

## 2022-10-26 RX ORDER — FLUOROURACIL 50 MG/ML
SOLUTION TOPICAL
Qty: 10 ML | Refills: 0 | Status: SHIPPED | OUTPATIENT
Start: 2022-10-26 | End: 2022-10-26 | Stop reason: SDUPTHER

## 2022-10-26 NOTE — PATIENT INSTRUCTIONS
Fluorouracil (Efudex / Kristan Guadalupe)    Your physician has prescribed a topical medication that is used to treat pre-cancerous skin lesions and certain types of skin cancers. We are providing you with the following information so that you understand how the medication should be used and potential side effects you may experience. Clean and dry the areas of the skin that will be treated. Apply a small amount of the medication to your fingertip. Dab the medication onto the designated areas and rub it in. Discontinue the medication once the skin starts to scab. Typically this takes between 2 and 4 weeks after starting the medication. Avoid applying the medication in or around the eyes or eyelids. If the medication gets into your eyes, nose or mouth, rinse immediately. Wash your hands immediately after application. Side effects include: burning, redness, irritation, dryness, pain, swelling and changes in skin color. Vaseline and/or cool compresses may be applied to affected areas for comfort. Please note that you may be more sensitive to the sun. Use sunscreen and wear protective clothing when outdoors. Avoid prolonged sun exposure. Once you have discontinued the medication, apply vaseline several times daily until the skin has healed.

## 2022-10-26 NOTE — PROGRESS NOTES
[address] [facility]  Chadd Bateman MD  [phone]      [address]  1950    70 y.o. male     Date of Visit: 10/26/2022    Chief Complaint: skin lesions    History of Present Illness:    1. He reports several persistent scaly lesions on the frontal scalp. 2.  He has stable pigmented lesions on the upper trunk and shoulders. 3.  He also complains of a new onset papulopustular eruption on the nose. 4.  He has a history of multiple BCCs:    Small nodular and superficial BCC of the left forearm-treated with electrodesiccation and curettage on 4/29/2022. Nodular basal cell carcinoma of the left postauricular region-treated with Mohs by Dr. Rafaela Fox on 1/21/2020. Nodular BCC on the left central cheek-treated with Mohs by Dr. Rafaela Fox on 8/6/2019. Nodular BCC on the R nasal tip-treated with Mohs by Dr. Rafaela Fox on 10/2/2018. Nodular BCC of the left nasal ala-treated with Mohs by Dr. Michaela Onofre in April 2014. Superficial BCC of the right shoulder-treated with curettage in April 2014. BCC of the right mid cheek-treated with Mohs by Dr. Michaela Onofre in Sept 2012. Pigmented nodular BCC the right mandible-treated with Mohs in Dec 2011. Superficial BCC of the right cheek-treated with Mohs by Dr. Michaela Onofre in December 2009. On Eliquis. 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 Has a home in [place]. Retiring. Review of Systems:  Gen: Feels well, good sense of health. Past Medical History, Family History, Surgical History, Medications and Allergies reviewed.     Past Medical History:   Diagnosis Date    Cancer ([address]     [address] R nasal tip    Diabetes mellitus ([address]     Fracture cervical vertebra-closed ([address] 3/16/13     Past Surgical History:   Procedure Laterality Date    COLONOSCOPY N/A 1/22/2021    COLONOSCOPY WITH BIOPSY performed by Mili Mendez MD at [address] N/A 5/25/2022    DECOMPRESSIVE LUMBAR LAMINECTOMY L3, L4, L5 performed by Michael Pedroza MD at [place] [address]  10/02/2018    right nasal tip    TONSILLECTOMY         Allergies   Allergen Reactions    No Known Allergies      Outpatient Medications Marked as Taking for the 10/26/22 encounter (Office Visit) with Memory Crigler, MD   Medication Sig Dispense Refill    apixaban (ELIQUIS) 5 MG TABS tablet Take by mouth 2 times daily      metroNIDAZOLE (METROGEL) 0.75 % gel Apply to the nose 2 times daily for rosacea. 45 g 2    fluorouracil (EFUDEX) 5 % SOLN external solution Apply to the scaly lesions on the scalp 2 times daily for 14 days. 10 mL 0    losartan (COZAAR) 25 MG tablet Take 1 tablet by mouth once daily 90 tablet 1    ciprofloxacin (CIPRO) 500 MG tablet Take 1 tablet by mouth twice daily for 10 days 20 tablet 0    atorvastatin (LIPITOR) 40 MG tablet Take 1 tablet by mouth once daily 90 tablet 0    JARDIANCE 25 MG tablet Take 1 tablet by mouth once daily 90 tablet 3    JANUMET  MG per tablet TAKE 1 TABLET BY MOUTH TWICE DAILY WITH MEALS 180 tablet 3    zinc gluconate 50 MG tablet Take 50 mg by mouth daily       niacin 500 MG tablet Take 500 mg by mouth every evening      ascorbic acid (VITAMIN C) 250 MG tablet Take 250 mg by mouth daily      glipiZIDE (GLUCOTROL) 10 MG tablet TAKE 1 TABLET BY MOUTH TWICE DAILY BEFORE MEAL(S) 180 tablet 3    mometasone (ELOCON) 0.1 % cream Apply to affected areas of the skin twice daily for up 2 weeks or until improved. For dermatitis. (Patient taking differently: Apply to affected areas of the skin twice daily for up 2 weeks or until improved. For dermatitis.) 45 g 2    acetaminophen (TYLENOL) 325 MG tablet Take 650 mg by mouth every 6 hours as needed for Pain. Cholecalciferol (VITAMIN D-3) 5000 UNITS TABS Take 5,000 Units by mouth daily       vitamin E 1000 UNITS capsule Take 1,000 Units by mouth daily.              Physical Examination       The following were examined and determined to be normal: Psych/Neuro, Conjunctivae/eyelids, Gums/teeth/lips, Neck, Breast/axilla/chest, Abdomen, Back, RUE, LUE, RLE, LLE, and Nails/digits. The following were examined and determined to be abnormal: Scalp/hair and Head/face. Well appearing. 1.  Frontal scalp - 6, left forehead - 2: ill defined keratotic pink macules. 2.  Shoulders and upper trunk with multiple round smooth light brown macules and patches. 3.  Nose with mild erythema and several papules and pustules. 4.  Clear. Assessment and Plan     1. Actinic keratosis - several lesions on the scalp    Cryotherapy was discussed and patient agreed to proceed. Consent was obtained. 8 lesions were treated cryotherapy: Frontal scalp - 6, left forehead - 2. 2 cycles of liquid nitrogen applied to each lesion for 5 seconds using a Cry-Ac cryo spray gun. Patient was educated regarding the potential risks of blister formation and discomfort. Wound care was discussed. The patient tolerated the procedure well and there were no immediate complications. Efudex 5% solution twice daily for 14 days if lesions persistent after 2 weeks. We discussed the likely side effects of treatment including redness, crusting, itching and burning. 2. Solar lentiginosis     Monitor for change. Sun protective behaviors encouraged including use of at least SPF 30 or more sunscreen. 3. Rosacea - mild papulopustular    Metronidazole 0.75% gel twice daily. 4. History of basal cell carcinomas - clear    Sun protective behaviors, including use of at least SPF 30 sunscreen, and self skin examinations were encouraged. Call for any new or concerning lesions. Return in about 6 months (around 4/26/2023).     --Dottie Ulloa MD

## 2022-11-28 RX ORDER — ATORVASTATIN CALCIUM 40 MG/1
TABLET, FILM COATED ORAL
Qty: 90 TABLET | Refills: 1 | Status: SHIPPED | OUTPATIENT
Start: 2022-11-28

## 2022-11-30 ENCOUNTER — OFFICE VISIT (OUTPATIENT)
Dept: FAMILY MEDICINE CLINIC | Age: 72
End: 2022-11-30
Payer: MEDICARE

## 2022-11-30 VITALS
BODY MASS INDEX: 28 KG/M2 | SYSTOLIC BLOOD PRESSURE: 122 MMHG | HEIGHT: 71 IN | WEIGHT: 200 LBS | OXYGEN SATURATION: 99 % | RESPIRATION RATE: 16 BRPM | HEART RATE: 63 BPM | DIASTOLIC BLOOD PRESSURE: 80 MMHG

## 2022-11-30 DIAGNOSIS — L71.9 ROSACEA: ICD-10-CM

## 2022-11-30 DIAGNOSIS — G83.4 CAUDA EQUINA SYNDROME NOT AFFECTING BLADDER (HCC): ICD-10-CM

## 2022-11-30 DIAGNOSIS — I10 PRIMARY HYPERTENSION: ICD-10-CM

## 2022-11-30 DIAGNOSIS — E11.8 TYPE 2 DIABETES MELLITUS WITH COMPLICATION, WITHOUT LONG-TERM CURRENT USE OF INSULIN (HCC): Primary | ICD-10-CM

## 2022-11-30 LAB — HBA1C MFR BLD: 6.9 %

## 2022-11-30 PROCEDURE — 3078F DIAST BP <80 MM HG: CPT | Performed by: FAMILY MEDICINE

## 2022-11-30 PROCEDURE — 3017F COLORECTAL CA SCREEN DOC REV: CPT | Performed by: FAMILY MEDICINE

## 2022-11-30 PROCEDURE — 1123F ACP DISCUSS/DSCN MKR DOCD: CPT | Performed by: FAMILY MEDICINE

## 2022-11-30 PROCEDURE — 3074F SYST BP LT 130 MM HG: CPT | Performed by: FAMILY MEDICINE

## 2022-11-30 PROCEDURE — G8427 DOCREV CUR MEDS BY ELIG CLIN: HCPCS | Performed by: FAMILY MEDICINE

## 2022-11-30 PROCEDURE — 83036 HEMOGLOBIN GLYCOSYLATED A1C: CPT | Performed by: FAMILY MEDICINE

## 2022-11-30 PROCEDURE — G8484 FLU IMMUNIZE NO ADMIN: HCPCS | Performed by: FAMILY MEDICINE

## 2022-11-30 PROCEDURE — 1036F TOBACCO NON-USER: CPT | Performed by: FAMILY MEDICINE

## 2022-11-30 PROCEDURE — 3044F HG A1C LEVEL LT 7.0%: CPT | Performed by: FAMILY MEDICINE

## 2022-11-30 PROCEDURE — 2022F DILAT RTA XM EVC RTNOPTHY: CPT | Performed by: FAMILY MEDICINE

## 2022-11-30 PROCEDURE — 99214 OFFICE O/P EST MOD 30 MIN: CPT | Performed by: FAMILY MEDICINE

## 2022-11-30 PROCEDURE — G8417 CALC BMI ABV UP PARAM F/U: HCPCS | Performed by: FAMILY MEDICINE

## 2022-11-30 RX ORDER — MULTIVITAMIN WITH IRON
TABLET ORAL
COMMUNITY

## 2022-11-30 RX ORDER — PHENOL 1.4 %
AEROSOL, SPRAY (ML) MUCOUS MEMBRANE
COMMUNITY

## 2022-11-30 RX ORDER — ASCORBIC ACID 500 MG
500 TABLET ORAL DAILY
COMMUNITY

## 2022-11-30 RX ORDER — VITAMIN B COMPLEX
1 CAPSULE ORAL DAILY
COMMUNITY

## 2022-11-30 RX ORDER — IVERMECTIN 10 MG/G
CREAM TOPICAL
Qty: 45 G | Refills: 3 | Status: SHIPPED | OUTPATIENT
Start: 2022-11-30

## 2022-11-30 SDOH — ECONOMIC STABILITY: FOOD INSECURITY: WITHIN THE PAST 12 MONTHS, YOU WORRIED THAT YOUR FOOD WOULD RUN OUT BEFORE YOU GOT MONEY TO BUY MORE.: NEVER TRUE

## 2022-11-30 SDOH — ECONOMIC STABILITY: FOOD INSECURITY: WITHIN THE PAST 12 MONTHS, THE FOOD YOU BOUGHT JUST DIDN'T LAST AND YOU DIDN'T HAVE MONEY TO GET MORE.: NEVER TRUE

## 2022-11-30 ASSESSMENT — PATIENT HEALTH QUESTIONNAIRE - PHQ9
SUM OF ALL RESPONSES TO PHQ QUESTIONS 1-9: 0
1. LITTLE INTEREST OR PLEASURE IN DOING THINGS: 0
SUM OF ALL RESPONSES TO PHQ9 QUESTIONS 1 & 2: 0
SUM OF ALL RESPONSES TO PHQ QUESTIONS 1-9: 0
2. FEELING DOWN, DEPRESSED OR HOPELESS: 0
SUM OF ALL RESPONSES TO PHQ QUESTIONS 1-9: 0
SUM OF ALL RESPONSES TO PHQ QUESTIONS 1-9: 0

## 2022-11-30 ASSESSMENT — SOCIAL DETERMINANTS OF HEALTH (SDOH): HOW HARD IS IT FOR YOU TO PAY FOR THE VERY BASICS LIKE FOOD, HOUSING, MEDICAL CARE, AND HEATING?: NOT HARD AT ALL

## 2022-11-30 NOTE — PROGRESS NOTES
11/30/2022    This is a 67 y.o. male   Chief Complaint   Patient presents with    Diabetes     Pt states he is doing well      HPI    Diabetes  Hemoglobin A1C   Date Value Ref Range Status   11/30/2022 6.9 % Final   He reports doing well on his current medications of Janumet, glipizide, Jardiance. He is on an ARB and a statin    BP Readings from Last 3 Encounters:   11/30/22 122/80   05/26/22 126/88   05/18/22 122/78   Takes his blood pressure medication regularly. On losartan and metoprolol. Is interested in something else to help with rosacea    Discussed recovery from surgery for cauda equina syndrome    Review of Systems     Current Outpatient Medications   Medication Sig Dispense Refill    atorvastatin (LIPITOR) 40 MG tablet Take 1 tablet by mouth once daily 90 tablet 1    metoprolol succinate (TOPROL XL) 25 MG extended release tablet TAKE 1 TABLET BY MOUTH ONCE DAILY      apixaban (ELIQUIS) 5 MG TABS tablet Take by mouth 2 times daily      metroNIDAZOLE (METROGEL) 0.75 % gel Apply to the nose 2 times daily for rosacea. 45 g 2    fluorouracil (EFUDEX) 5 % SOLN external solution Apply to the scaly lesions on the scalp 2 times daily for 14 days.  10 mL 0    losartan (COZAAR) 25 MG tablet Take 1 tablet by mouth once daily 90 tablet 1    ciprofloxacin (CIPRO) 500 MG tablet Take 1 tablet by mouth twice daily for 10 days 20 tablet 0    tadalafil (CIALIS) 5 MG tablet TAKE 1 TABLET BY MOUTH ONCE DAILY AS NEEDED FOR ERECTILE DYSFUNCTION 90 tablet 0    JARDIANCE 25 MG tablet Take 1 tablet by mouth once daily 90 tablet 3    JANUMET  MG per tablet TAKE 1 TABLET BY MOUTH TWICE DAILY WITH MEALS 180 tablet 3    zinc gluconate 50 MG tablet Take 50 mg by mouth daily       niacin 500 MG tablet Take 500 mg by mouth every evening      ascorbic acid (VITAMIN C) 250 MG tablet Take 250 mg by mouth daily      glipiZIDE (GLUCOTROL) 10 MG tablet TAKE 1 TABLET BY MOUTH TWICE DAILY BEFORE MEAL(S) 180 tablet 3    mometasone (ELOCON) 0.1 % cream Apply to affected areas of the skin twice daily for up 2 weeks or until improved. For dermatitis. (Patient taking differently: Apply to affected areas of the skin twice daily for up 2 weeks or until improved. For dermatitis.) 45 g 2    acetaminophen (TYLENOL) 325 MG tablet Take 650 mg by mouth every 6 hours as needed for Pain. Cholecalciferol (VITAMIN D-3) 5000 UNITS TABS Take 5,000 Units by mouth daily       vitamin E 1000 UNITS capsule Take 1,000 Units by mouth daily. Ivermectin 1 % CREA Apply topically daily as directed 45 g 3     No current facility-administered medications for this visit. /80 (Site: Left Upper Arm, Position: Sitting, Cuff Size: Large Adult)   Pulse 63   Resp 16   Ht 5' 11\" (1.803 m)   Wt 200 lb (90.7 kg)   SpO2 99%   BMI 27.89 kg/m²     Physical Exam    Wt Readings from Last 3 Encounters:   11/30/22 200 lb (90.7 kg)   05/26/22 201 lb 11.5 oz (91.5 kg)   05/18/22 203 lb (92.1 kg)     BP Readings from Last 3 Encounters:   11/30/22 122/80   05/26/22 126/88   05/18/22 122/78     Assessment/Plan:  1. Type 2 diabetes mellitus with complication, without long-term current use of insulin ([facility])  A1c of 6.9  Continue current regimen  Tolerating meds well    2. Primary hypertension  Well controlled on current regimen. No side effects from medications. Advise low salt diet and routine exercise    3. Rosacea  Didn't respond well to metronidazole gel. Trial topical ivermectin  - Ivermectin 1 % CREA; Apply topically daily as directed  Dispense: 45 g; Refill: 3    4. Cauda equina syndrome not affecting bladder (HCC)  Overall recovering well.    We discussed his recovery

## 2022-12-16 RX ORDER — CIPROFLOXACIN 500 MG/1
TABLET, FILM COATED ORAL
Qty: 20 TABLET | Refills: 0 | Status: SHIPPED | OUTPATIENT
Start: 2022-12-16

## 2022-12-22 ENCOUNTER — TELEPHONE (OUTPATIENT)
Dept: FAMILY MEDICINE CLINIC | Age: 72
End: 2022-12-22

## 2022-12-22 DIAGNOSIS — E11.8 TYPE 2 DIABETES MELLITUS WITH COMPLICATION, WITHOUT LONG-TERM CURRENT USE OF INSULIN (HCC): ICD-10-CM

## 2022-12-22 RX ORDER — GLIPIZIDE 10 MG/1
TABLET ORAL
Qty: 180 TABLET | Refills: 0 | Status: SHIPPED | OUTPATIENT
Start: 2022-12-22

## 2022-12-23 DIAGNOSIS — N52.8 OTHER MALE ERECTILE DYSFUNCTION: ICD-10-CM

## 2022-12-23 DIAGNOSIS — R35.1 BPH ASSOCIATED WITH NOCTURIA: ICD-10-CM

## 2022-12-23 DIAGNOSIS — N40.1 BPH ASSOCIATED WITH NOCTURIA: ICD-10-CM

## 2022-12-23 RX ORDER — TADALAFIL 5 MG/1
TABLET ORAL
Qty: 90 TABLET | Refills: 0 | Status: SHIPPED | OUTPATIENT
Start: 2022-12-23

## 2022-12-26 DIAGNOSIS — E11.8 TYPE 2 DIABETES MELLITUS WITH COMPLICATION, WITHOUT LONG-TERM CURRENT USE OF INSULIN (HCC): ICD-10-CM

## 2022-12-27 RX ORDER — GLIPIZIDE 10 MG/1
TABLET ORAL
Qty: 180 TABLET | Refills: 0 | Status: SHIPPED | OUTPATIENT
Start: 2022-12-27

## 2023-03-20 DIAGNOSIS — N40.1 BPH ASSOCIATED WITH NOCTURIA: ICD-10-CM

## 2023-03-20 DIAGNOSIS — R35.1 BPH ASSOCIATED WITH NOCTURIA: ICD-10-CM

## 2023-03-20 DIAGNOSIS — N52.8 OTHER MALE ERECTILE DYSFUNCTION: ICD-10-CM

## 2023-03-20 RX ORDER — TADALAFIL 5 MG/1
TABLET ORAL
Qty: 90 TABLET | Refills: 0 | Status: SHIPPED | OUTPATIENT
Start: 2023-03-20

## 2023-03-26 DIAGNOSIS — E11.8 TYPE 2 DIABETES MELLITUS WITH COMPLICATION, WITHOUT LONG-TERM CURRENT USE OF INSULIN (HCC): ICD-10-CM

## 2023-03-27 RX ORDER — GLIPIZIDE 10 MG/1
TABLET ORAL
Qty: 180 TABLET | Refills: 1 | Status: SHIPPED | OUTPATIENT
Start: 2023-03-27

## 2023-04-26 ENCOUNTER — OFFICE VISIT (OUTPATIENT)
Dept: DERMATOLOGY | Age: 73
End: 2023-04-26
Payer: MEDICARE

## 2023-04-26 DIAGNOSIS — L57.0 ACTINIC KERATOSIS: ICD-10-CM

## 2023-04-26 DIAGNOSIS — L71.9 ROSACEA: Primary | ICD-10-CM

## 2023-04-26 PROCEDURE — 17003 DESTRUCT PREMALG LES 2-14: CPT | Performed by: DERMATOLOGY

## 2023-04-26 PROCEDURE — 17000 DESTRUCT PREMALG LESION: CPT | Performed by: DERMATOLOGY

## 2023-04-26 PROCEDURE — G8417 CALC BMI ABV UP PARAM F/U: HCPCS | Performed by: DERMATOLOGY

## 2023-04-26 PROCEDURE — 1123F ACP DISCUSS/DSCN MKR DOCD: CPT | Performed by: DERMATOLOGY

## 2023-04-26 PROCEDURE — 99213 OFFICE O/P EST LOW 20 MIN: CPT | Performed by: DERMATOLOGY

## 2023-04-26 PROCEDURE — G8427 DOCREV CUR MEDS BY ELIG CLIN: HCPCS | Performed by: DERMATOLOGY

## 2023-04-26 PROCEDURE — 3017F COLORECTAL CA SCREEN DOC REV: CPT | Performed by: DERMATOLOGY

## 2023-04-26 PROCEDURE — 1036F TOBACCO NON-USER: CPT | Performed by: DERMATOLOGY

## 2023-04-26 RX ORDER — SILDENAFIL 100 MG/1
100 TABLET, FILM COATED ORAL PRN
COMMUNITY

## 2023-04-26 NOTE — PROGRESS NOTES
REPAIR      LUMBAR SPINE SURGERY N/A 5/25/2022    DECOMPRESSIVE LUMBAR LAMINECTOMY L3, L4, L5 performed by Waldemar Sanchez MD at [place] [address]  10/02/2018    right nasal tip    TONSILLECTOMY         Allergies   Allergen Reactions    No Known Allergies      Outpatient Medications Marked as Taking for the 4/26/23 encounter (Office Visit) with Foy Romberg, MD   Medication Sig Dispense Refill    sildenafil (VIAGRA) 100 MG tablet Take 1 tablet by mouth as needed for Erectile Dysfunction      metroNIDAZOLE (METROCREAM) 0.75 % cream Apply to the nose 2 times daily for rosacea. 45 g 3    glipiZIDE (GLUCOTROL) 10 MG tablet TAKE 1 TABLET BY MOUTH TWICE DAILY BEFORE MEAL(S) 180 tablet 1    tadalafil (CIALIS) 5 MG tablet TAKE 1 TABLET BY MOUTH ONCE DAILY AS NEEDED FOR ERECTILE DYSFUNCTION 90 tablet 0    ciprofloxacin (CIPRO) 500 MG tablet Take 1 tablet by mouth twice daily for 10 days 20 tablet 0    Vitamin A 2400 MCG (8000 UT) CAPS Take by mouth      vitamin C (ASCORBIC ACID) 500 MG tablet Take 1 tablet by mouth daily      Melatonin 10 MG TABS Take by mouth      b complex vitamins capsule Take 1 capsule by mouth daily      Apoaequorin (PREVAGEN) 10 MG CAPS Take by mouth      atorvastatin (LIPITOR) 40 MG tablet Take 1 tablet by mouth once daily 90 tablet 1    metoprolol succinate (TOPROL XL) 25 MG extended release tablet TAKE 1 TABLET BY MOUTH ONCE DAILY      apixaban (ELIQUIS) 5 MG TABS tablet Take by mouth 2 times daily      [DISCONTINUED] metroNIDAZOLE (METROGEL) 0.75 % gel Apply to the nose 2 times daily for rosacea.  45 g 2    losartan (COZAAR) 25 MG tablet Take 1 tablet by mouth once daily 90 tablet 1    JARDIANCE 25 MG tablet Take 1 tablet by mouth once daily 90 tablet 3    JANUMET  MG per tablet TAKE 1 TABLET BY MOUTH TWICE DAILY WITH MEALS 180 tablet 3    zinc gluconate 50 MG tablet Take 1 tablet by mouth daily      niacin 500 MG tablet Take 1 tablet by mouth every evening      ascorbic acid

## 2023-05-02 RX ORDER — LOSARTAN POTASSIUM 25 MG/1
TABLET ORAL
Qty: 90 TABLET | Refills: 1 | Status: SHIPPED | OUTPATIENT
Start: 2023-05-02

## 2023-05-31 ENCOUNTER — OFFICE VISIT (OUTPATIENT)
Dept: FAMILY MEDICINE CLINIC | Age: 73
End: 2023-05-31

## 2023-05-31 VITALS
HEART RATE: 76 BPM | WEIGHT: 200 LBS | SYSTOLIC BLOOD PRESSURE: 126 MMHG | HEIGHT: 71 IN | BODY MASS INDEX: 28 KG/M2 | RESPIRATION RATE: 16 BRPM | OXYGEN SATURATION: 98 % | DIASTOLIC BLOOD PRESSURE: 80 MMHG

## 2023-05-31 DIAGNOSIS — I10 PRIMARY HYPERTENSION: ICD-10-CM

## 2023-05-31 DIAGNOSIS — G83.4 CAUDA EQUINA SYNDROME NOT AFFECTING BLADDER (HCC): ICD-10-CM

## 2023-05-31 DIAGNOSIS — E11.8 TYPE 2 DIABETES MELLITUS WITH COMPLICATION, WITHOUT LONG-TERM CURRENT USE OF INSULIN (HCC): Primary | ICD-10-CM

## 2023-05-31 DIAGNOSIS — I48.0 PAROXYSMAL ATRIAL FIBRILLATION (HCC): ICD-10-CM

## 2023-05-31 DIAGNOSIS — E11.8 TYPE 2 DIABETES MELLITUS WITH COMPLICATION, WITHOUT LONG-TERM CURRENT USE OF INSULIN (HCC): ICD-10-CM

## 2023-05-31 LAB
ALBUMIN SERPL-MCNC: 4.9 G/DL (ref 3.4–5)
ALBUMIN/GLOB SERPL: 1.9 {RATIO} (ref 1.1–2.2)
ALP SERPL-CCNC: 55 U/L (ref 40–129)
ALT SERPL-CCNC: 25 U/L (ref 10–40)
ANION GAP SERPL CALCULATED.3IONS-SCNC: 16 MMOL/L (ref 3–16)
AST SERPL-CCNC: 26 U/L (ref 15–37)
BASOPHILS # BLD: 0 K/UL (ref 0–0.2)
BASOPHILS NFR BLD: 0.4 %
BILIRUB SERPL-MCNC: 0.5 MG/DL (ref 0–1)
BUN SERPL-MCNC: 29 MG/DL (ref 7–20)
CALCIUM SERPL-MCNC: 10.1 MG/DL (ref 8.3–10.6)
CHLORIDE SERPL-SCNC: 105 MMOL/L (ref 99–110)
CHOLEST SERPL-MCNC: 148 MG/DL (ref 0–199)
CO2 SERPL-SCNC: 22 MMOL/L (ref 21–32)
CREAT SERPL-MCNC: 1.2 MG/DL (ref 0.8–1.3)
CREAT UR-MCNC: 86.7 MG/DL (ref 39–259)
DEPRECATED RDW RBC AUTO: 15.9 % (ref 12.4–15.4)
EOSINOPHIL # BLD: 0.3 K/UL (ref 0–0.6)
EOSINOPHIL NFR BLD: 4.6 %
GFR SERPLBLD CREATININE-BSD FMLA CKD-EPI: >60 ML/MIN/{1.73_M2}
GLUCOSE SERPL-MCNC: 170 MG/DL (ref 70–99)
HBA1C MFR BLD: 7.2 %
HCT VFR BLD AUTO: 42.6 % (ref 40.5–52.5)
HDLC SERPL-MCNC: 42 MG/DL (ref 40–60)
HGB BLD-MCNC: 14.4 G/DL (ref 13.5–17.5)
LDLC SERPL CALC-MCNC: 50 MG/DL
LYMPHOCYTES # BLD: 1 K/UL (ref 1–5.1)
LYMPHOCYTES NFR BLD: 14.2 %
MCH RBC QN AUTO: 30.5 PG (ref 26–34)
MCHC RBC AUTO-ENTMCNC: 33.9 G/DL (ref 31–36)
MCV RBC AUTO: 89.8 FL (ref 80–100)
MICROALBUMIN UR DL<=1MG/L-MCNC: 3.1 MG/DL
MICROALBUMIN/CREAT UR: 35.8 MG/G (ref 0–30)
MONOCYTES # BLD: 0.8 K/UL (ref 0–1.3)
MONOCYTES NFR BLD: 10.8 %
NEUTROPHILS # BLD: 5 K/UL (ref 1.7–7.7)
NEUTROPHILS NFR BLD: 70 %
PLATELET # BLD AUTO: 155 K/UL (ref 135–450)
PMV BLD AUTO: 8.9 FL (ref 5–10.5)
POTASSIUM SERPL-SCNC: 4.2 MMOL/L (ref 3.5–5.1)
PROT SERPL-MCNC: 7.5 G/DL (ref 6.4–8.2)
RBC # BLD AUTO: 4.74 M/UL (ref 4.2–5.9)
SODIUM SERPL-SCNC: 143 MMOL/L (ref 136–145)
TRIGL SERPL-MCNC: 279 MG/DL (ref 0–150)
VIT B12 SERPL-MCNC: 459 PG/ML (ref 211–911)
VLDLC SERPL CALC-MCNC: 56 MG/DL
WBC # BLD AUTO: 7.1 K/UL (ref 4–11)

## 2023-05-31 SDOH — ECONOMIC STABILITY: FOOD INSECURITY: WITHIN THE PAST 12 MONTHS, THE FOOD YOU BOUGHT JUST DIDN'T LAST AND YOU DIDN'T HAVE MONEY TO GET MORE.: NEVER TRUE

## 2023-05-31 SDOH — ECONOMIC STABILITY: FOOD INSECURITY: WITHIN THE PAST 12 MONTHS, YOU WORRIED THAT YOUR FOOD WOULD RUN OUT BEFORE YOU GOT MONEY TO BUY MORE.: NEVER TRUE

## 2023-05-31 SDOH — ECONOMIC STABILITY: INCOME INSECURITY: HOW HARD IS IT FOR YOU TO PAY FOR THE VERY BASICS LIKE FOOD, HOUSING, MEDICAL CARE, AND HEATING?: NOT HARD AT ALL

## 2023-05-31 SDOH — ECONOMIC STABILITY: HOUSING INSECURITY
IN THE LAST 12 MONTHS, WAS THERE A TIME WHEN YOU DID NOT HAVE A STEADY PLACE TO SLEEP OR SLEPT IN A SHELTER (INCLUDING NOW)?: NO

## 2023-05-31 NOTE — PROGRESS NOTES
5/31/2023    This is a 67 y.o. male   Chief Complaint   Patient presents with    Diabetes     HPI    Diabetes  Hemoglobin A1C   Date Value Ref Range Status   05/31/2023 7.2 % Final   He remains on Jardiance, glipizide, Janumet  Prior A1c was below 7    HTN  No issues with BP meds  BP Readings from Last 3 Encounters:   05/31/23 126/80   11/30/22 122/80   05/26/22 126/88       Continuing to struggle with some neuropathy and leg strength issues after surgery for cauda equina, but overall recovering nicely. Review of Systems     Current Outpatient Medications   Medication Sig Dispense Refill    losartan (COZAAR) 25 MG tablet Take 1 tablet by mouth once daily 90 tablet 1    sildenafil (VIAGRA) 100 MG tablet Take 1 tablet by mouth as needed for Erectile Dysfunction      metroNIDAZOLE (METROCREAM) 0.75 % cream Apply to the nose 2 times daily for rosacea.  45 g 3    glipiZIDE (GLUCOTROL) 10 MG tablet TAKE 1 TABLET BY MOUTH TWICE DAILY BEFORE MEAL(S) 180 tablet 1    tadalafil (CIALIS) 5 MG tablet TAKE 1 TABLET BY MOUTH ONCE DAILY AS NEEDED FOR ERECTILE DYSFUNCTION 90 tablet 0    ciprofloxacin (CIPRO) 500 MG tablet Take 1 tablet by mouth twice daily for 10 days 20 tablet 0    Vitamin A 2400 MCG (8000 UT) CAPS Take by mouth      vitamin C (ASCORBIC ACID) 500 MG tablet Take 1 tablet by mouth daily      Melatonin 10 MG TABS Take by mouth      b complex vitamins capsule Take 1 capsule by mouth daily      Apoaequorin (PREVAGEN) 10 MG CAPS Take by mouth      atorvastatin (LIPITOR) 40 MG tablet Take 1 tablet by mouth once daily 90 tablet 1    metoprolol succinate (TOPROL XL) 25 MG extended release tablet TAKE 1 TABLET BY MOUTH ONCE DAILY      apixaban (ELIQUIS) 5 MG TABS tablet Take by mouth 2 times daily      JARDIANCE 25 MG tablet Take 1 tablet by mouth once daily 90 tablet 3    JANUMET  MG per tablet TAKE 1 TABLET BY MOUTH TWICE DAILY WITH MEALS 180 tablet 3    zinc gluconate 50 MG tablet Take 1 tablet by mouth daily

## 2023-06-19 RX ORDER — SITAGLIPTIN AND METFORMIN HYDROCHLORIDE 1000; 50 MG/1; MG/1
TABLET, FILM COATED ORAL
Qty: 180 TABLET | Refills: 1 | Status: SHIPPED | OUTPATIENT
Start: 2023-06-19

## 2023-06-19 RX ORDER — ATORVASTATIN CALCIUM 40 MG/1
TABLET, FILM COATED ORAL
Qty: 90 TABLET | Refills: 1 | Status: SHIPPED | OUTPATIENT
Start: 2023-06-19

## 2023-07-01 DIAGNOSIS — N52.8 OTHER MALE ERECTILE DYSFUNCTION: ICD-10-CM

## 2023-07-01 DIAGNOSIS — N40.1 BPH ASSOCIATED WITH NOCTURIA: ICD-10-CM

## 2023-07-01 DIAGNOSIS — R35.1 BPH ASSOCIATED WITH NOCTURIA: ICD-10-CM

## 2023-07-03 RX ORDER — TADALAFIL 5 MG/1
TABLET ORAL
Qty: 90 TABLET | Refills: 0 | Status: SHIPPED | OUTPATIENT
Start: 2023-07-03

## 2023-07-17 ENCOUNTER — TELEPHONE (OUTPATIENT)
Dept: DERMATOLOGY | Age: 73
End: 2023-07-17

## 2023-07-17 NOTE — TELEPHONE ENCOUNTER
Pt left a voicemail at 10:20 am. He says he was told by Dr. Yonathan Ann to call back if the Metronidazole cream was not working. He says the cream is not helping his Rosacea.  He says Dr. Yonathan Ann was going to try to get him a more affordable version of Ivermectin cream.    Pt phone is [phone]

## 2023-07-19 NOTE — TELEPHONE ENCOUNTER
Sent Rx for [facility] rosacea triple cream with azelaic acid 15%, ivermectin 1% and metronidazole 1%.

## 2023-07-19 NOTE — TELEPHONE ENCOUNTER
Will send Rx to [facility]. I'll need his email for them to contact him to set up delivery. Advancement-Rotation Flap Text: The defect edges were debeveled with a #15 scalpel blade. Given the location of the defect, shape of the defect and the proximity to free margins an advancement-rotation flap was deemed most appropriate. Using a sterile surgical marker, an appropriate flap was drawn incorporating the defect and placing the expected incisions within the relaxed skin tension lines where possible. The area thus outlined was incised deep to adipose tissue with a #15 scalpel blade. The skin margins were undermined to an appropriate distance in all directions utilizing iris scissors. Following this, the designed flap was carried over into the primary defect and sutured into place.

## 2023-08-04 DIAGNOSIS — E11.8 TYPE 2 DIABETES MELLITUS WITH COMPLICATION, WITHOUT LONG-TERM CURRENT USE OF INSULIN (HCC): ICD-10-CM

## 2023-08-04 RX ORDER — EMPAGLIFLOZIN 25 MG/1
TABLET, FILM COATED ORAL
Qty: 90 TABLET | Refills: 0 | Status: SHIPPED | OUTPATIENT
Start: 2023-08-04

## 2023-09-05 RX ORDER — GABAPENTIN 300 MG/1
300 CAPSULE ORAL 2 TIMES DAILY PRN
Qty: 60 CAPSULE | Refills: 2 | Status: SHIPPED | OUTPATIENT
Start: 2023-09-05 | End: 2023-12-04

## 2023-09-06 RX ORDER — CIPROFLOXACIN 500 MG/1
TABLET, FILM COATED ORAL
Qty: 20 TABLET | Refills: 0 | Status: SHIPPED | OUTPATIENT
Start: 2023-09-06

## 2023-10-03 DIAGNOSIS — N40.1 BPH ASSOCIATED WITH NOCTURIA: ICD-10-CM

## 2023-10-03 DIAGNOSIS — R35.1 BPH ASSOCIATED WITH NOCTURIA: ICD-10-CM

## 2023-10-03 DIAGNOSIS — N52.8 OTHER MALE ERECTILE DYSFUNCTION: ICD-10-CM

## 2023-10-03 RX ORDER — TADALAFIL 5 MG/1
TABLET ORAL
Qty: 90 TABLET | Refills: 0 | Status: SHIPPED | OUTPATIENT
Start: 2023-10-03

## 2023-10-19 RX ORDER — LOSARTAN POTASSIUM 25 MG/1
TABLET ORAL
Qty: 90 TABLET | Refills: 0 | Status: SHIPPED | OUTPATIENT
Start: 2023-10-19

## 2023-10-29 DIAGNOSIS — E11.8 TYPE 2 DIABETES MELLITUS WITH COMPLICATION, WITHOUT LONG-TERM CURRENT USE OF INSULIN (HCC): ICD-10-CM

## 2023-10-30 RX ORDER — EMPAGLIFLOZIN 25 MG/1
TABLET, FILM COATED ORAL
Qty: 90 TABLET | Refills: 0 | Status: SHIPPED | OUTPATIENT
Start: 2023-10-30

## 2023-11-08 ENCOUNTER — OFFICE VISIT (OUTPATIENT)
Dept: DERMATOLOGY | Age: 73
End: 2023-11-08
Payer: MEDICARE

## 2023-11-08 DIAGNOSIS — D48.5 NEOPLASM OF UNCERTAIN BEHAVIOR OF SKIN: ICD-10-CM

## 2023-11-08 DIAGNOSIS — L71.9 ROSACEA: ICD-10-CM

## 2023-11-08 DIAGNOSIS — L57.0 ACTINIC KERATOSIS: Primary | ICD-10-CM

## 2023-11-08 PROCEDURE — 11102 TANGNTL BX SKIN SINGLE LES: CPT | Performed by: DERMATOLOGY

## 2023-11-08 PROCEDURE — G8427 DOCREV CUR MEDS BY ELIG CLIN: HCPCS | Performed by: DERMATOLOGY

## 2023-11-08 PROCEDURE — 1123F ACP DISCUSS/DSCN MKR DOCD: CPT | Performed by: DERMATOLOGY

## 2023-11-08 PROCEDURE — 1036F TOBACCO NON-USER: CPT | Performed by: DERMATOLOGY

## 2023-11-08 PROCEDURE — 17000 DESTRUCT PREMALG LESION: CPT | Performed by: DERMATOLOGY

## 2023-11-08 PROCEDURE — 99213 OFFICE O/P EST LOW 20 MIN: CPT | Performed by: DERMATOLOGY

## 2023-11-08 PROCEDURE — G8417 CALC BMI ABV UP PARAM F/U: HCPCS | Performed by: DERMATOLOGY

## 2023-11-08 PROCEDURE — 17003 DESTRUCT PREMALG LES 2-14: CPT | Performed by: DERMATOLOGY

## 2023-11-08 PROCEDURE — 3017F COLORECTAL CA SCREEN DOC REV: CPT | Performed by: DERMATOLOGY

## 2023-11-08 PROCEDURE — G8484 FLU IMMUNIZE NO ADMIN: HCPCS | Performed by: DERMATOLOGY

## 2023-11-08 NOTE — PATIENT INSTRUCTIONS
Biopsy Wound Care Instructions    Keep the bandage in place for 24 hours. Cleanse the wound with mild soapy water daily  Gently dry the area. Apply Vaseline or petroleum jelly to the wound using a cotton tipped applicator. Cover with a clean bandage. Repeat this process until the biopsy site is healed. If you had stitches placed, continue treating the site until the stitches are removed. Remember to make an appointment to return to have your stitches removed by our staff. You may shower and bathe as usual.       ** Biopsy results generally take around 7 business days to come back. If you have not heard from us by then, please call the office at [phone]. *Please note that biopsy results are released to both the patient and physician at the same time in [address] [place] [address]. Please allow time for your physician to review the results. One of our staff members will reach out to you with the results and plan.

## 2023-11-08 NOTE — PROGRESS NOTES
with petrolatum and covered with a bandage. Wound care instructions were reviewed. 1 Specimen (s) sent to pathology. The specimen bottles were appropriately labeled. The patient tolerated the procedure well and there were no immediate complications. Return in about 6 months (around 5/8/2024).     --Didi Richards MD

## 2023-11-13 LAB — DERMATOLOGY PATHOLOGY REPORT: ABNORMAL

## 2023-11-27 RX ORDER — CIPROFLOXACIN 500 MG/1
TABLET, FILM COATED ORAL
Qty: 20 TABLET | Refills: 0 | Status: SHIPPED | OUTPATIENT
Start: 2023-11-27

## 2023-11-27 RX ORDER — GABAPENTIN 300 MG/1
CAPSULE ORAL
Qty: 60 CAPSULE | Refills: 2 | Status: SHIPPED | OUTPATIENT
Start: 2023-11-27 | End: 2024-02-25

## 2023-11-27 SDOH — HEALTH STABILITY: PHYSICAL HEALTH: ON AVERAGE, HOW MANY MINUTES DO YOU ENGAGE IN EXERCISE AT THIS LEVEL?: 60 MIN

## 2023-11-27 SDOH — HEALTH STABILITY: PHYSICAL HEALTH: ON AVERAGE, HOW MANY DAYS PER WEEK DO YOU ENGAGE IN MODERATE TO STRENUOUS EXERCISE (LIKE A BRISK WALK)?: 5 DAYS

## 2023-11-27 ASSESSMENT — LIFESTYLE VARIABLES
HOW MANY STANDARD DRINKS CONTAINING ALCOHOL DO YOU HAVE ON A TYPICAL DAY: 1 OR 2
HOW OFTEN DO YOU HAVE A DRINK CONTAINING ALCOHOL: 2-3 TIMES A WEEK
HOW OFTEN DO YOU HAVE A DRINK CONTAINING ALCOHOL: 4
HOW MANY STANDARD DRINKS CONTAINING ALCOHOL DO YOU HAVE ON A TYPICAL DAY: 1
HOW OFTEN DO YOU HAVE SIX OR MORE DRINKS ON ONE OCCASION: 1

## 2023-11-27 ASSESSMENT — PATIENT HEALTH QUESTIONNAIRE - PHQ9
SUM OF ALL RESPONSES TO PHQ9 QUESTIONS 1 & 2: 0
SUM OF ALL RESPONSES TO PHQ QUESTIONS 1-9: 0
1. LITTLE INTEREST OR PLEASURE IN DOING THINGS: 0
2. FEELING DOWN, DEPRESSED OR HOPELESS: 0

## 2023-11-29 ENCOUNTER — OFFICE VISIT (OUTPATIENT)
Dept: FAMILY MEDICINE CLINIC | Age: 73
End: 2023-11-29
Payer: MEDICARE

## 2023-11-29 VITALS
SYSTOLIC BLOOD PRESSURE: 136 MMHG | DIASTOLIC BLOOD PRESSURE: 80 MMHG | BODY MASS INDEX: 28 KG/M2 | WEIGHT: 200 LBS | OXYGEN SATURATION: 98 % | RESPIRATION RATE: 16 BRPM | HEART RATE: 63 BPM | HEIGHT: 71 IN

## 2023-11-29 DIAGNOSIS — Z00.00 MEDICARE ANNUAL WELLNESS VISIT, SUBSEQUENT: Primary | ICD-10-CM

## 2023-11-29 DIAGNOSIS — N40.1 BPH ASSOCIATED WITH NOCTURIA: ICD-10-CM

## 2023-11-29 DIAGNOSIS — E11.8 TYPE 2 DIABETES MELLITUS WITH COMPLICATION, WITHOUT LONG-TERM CURRENT USE OF INSULIN (HCC): ICD-10-CM

## 2023-11-29 DIAGNOSIS — R35.1 BPH ASSOCIATED WITH NOCTURIA: ICD-10-CM

## 2023-11-29 LAB
HBA1C MFR BLD: 7.7 %
PSA SERPL DL<=0.01 NG/ML-MCNC: 2.49 NG/ML (ref 0–4)

## 2023-11-29 PROCEDURE — 3079F DIAST BP 80-89 MM HG: CPT | Performed by: FAMILY MEDICINE

## 2023-11-29 PROCEDURE — 83036 HEMOGLOBIN GLYCOSYLATED A1C: CPT | Performed by: FAMILY MEDICINE

## 2023-11-29 PROCEDURE — 3017F COLORECTAL CA SCREEN DOC REV: CPT | Performed by: FAMILY MEDICINE

## 2023-11-29 PROCEDURE — G8484 FLU IMMUNIZE NO ADMIN: HCPCS | Performed by: FAMILY MEDICINE

## 2023-11-29 PROCEDURE — G0439 PPPS, SUBSEQ VISIT: HCPCS | Performed by: FAMILY MEDICINE

## 2023-11-29 PROCEDURE — 3075F SYST BP GE 130 - 139MM HG: CPT | Performed by: FAMILY MEDICINE

## 2023-11-29 PROCEDURE — 1123F ACP DISCUSS/DSCN MKR DOCD: CPT | Performed by: FAMILY MEDICINE

## 2023-11-29 PROCEDURE — 3051F HG A1C>EQUAL 7.0%<8.0%: CPT | Performed by: FAMILY MEDICINE

## 2023-12-01 LAB — GAD65 AB SER IA-ACNC: <5 IU/ML (ref 0–5)

## 2023-12-01 RX ORDER — ATORVASTATIN CALCIUM 40 MG/1
TABLET, FILM COATED ORAL
Qty: 90 TABLET | Refills: 0 | Status: SHIPPED | OUTPATIENT
Start: 2023-12-01

## 2023-12-02 LAB
INSULIN HUMAN AB SER-ACNC: <0.4 U/ML (ref 0–0.4)
PANC ISLET CELL AB TITR SER: NORMAL {TITER}

## 2023-12-08 ENCOUNTER — PROCEDURE VISIT (OUTPATIENT)
Dept: DERMATOLOGY | Age: 73
End: 2023-12-08

## 2023-12-08 DIAGNOSIS — C44.712 BASAL CELL CARCINOMA (BCC) OF PRETIBIAL REGION OF RIGHT LOWER EXTREMITY: Primary | ICD-10-CM

## 2023-12-08 NOTE — PROGRESS NOTES
[facility]  Wilfredo Silver MD  [phone]      [address]  1950    68 y.o. male     Date of Visit: 12/8/2023    Chief Complaint: [address]    History of Present Illness:    Here today for treatment of a [address] on the right mid shin. Comment: RESULTS  DIAGNOSIS  DIAGNOSIS:    RIGHT MID SHIN-    Basal cell carcinoma, superficial  RESULTS Chidi Byrne MD  Electronic Signature: 13 NOV 2023 03:49 PM       Review of Systems:  Gen: Feels well, good sense of health. Past Medical History, Family History, Surgical History, Medications and Allergies reviewed.     Past Medical History:   Diagnosis Date    Cancer ([address])     [address] R nasal tip    Diabetes mellitus ([address])     Fracture cervical vertebra-closed ([address]) 3/16/13     Past Surgical History:   Procedure Laterality Date    COLONOSCOPY N/A 1/22/2021    COLONOSCOPY WITH BIOPSY performed by Yg Lorenzo MD at [address] N/A 5/25/2022    DECOMPRESSIVE LUMBAR LAMINECTOMY L3, L4, L5 performed by Miguel Mcgee MD at [facility]  10/02/2018    right nasal tip    TONSILLECTOMY         Allergies   Allergen Reactions    No Known Allergies      Outpatient Medications Marked as Taking for the 12/8/23 encounter (Procedure visit) with Arvind Lee MD   Medication Sig Dispense Refill    atorvastatin (LIPITOR) 40 MG tablet Take 1 tablet by mouth once daily 90 tablet 0    gabapentin (NEURONTIN) 300 MG capsule TAKE 1 CAPSULE BY MOUTH TWICE DAILY AS NEEDED FOR  NERVE  PAIN 60 capsule 2    ciprofloxacin (CIPRO) 500 MG tablet Take 1 tablet by mouth twice daily for 10 days 20 tablet 0    Misc Natural Products (NEURIVA PO) Take by mouth      JARDIANCE 25 MG tablet Take 1 tablet by mouth once daily 90 tablet 0    losartan (COZAAR) 25 MG tablet Take 1 tablet by mouth once daily 90 tablet 0    tadalafil (CIALIS) 5 MG tablet TAKE 1 TABLET BY MOUTH ONCE DAILY AS NEEDED FOR

## 2023-12-22 RX ORDER — SITAGLIPTIN AND METFORMIN HYDROCHLORIDE 1000; 50 MG/1; MG/1
TABLET, FILM COATED ORAL
Qty: 180 TABLET | Refills: 0 | OUTPATIENT
Start: 2023-12-22

## 2024-01-11 ENCOUNTER — TELEPHONE (OUTPATIENT)
Dept: FAMILY MEDICINE CLINIC | Age: 74
End: 2024-01-11

## 2024-01-11 RX ORDER — SILDENAFIL CITRATE 20 MG/1
TABLET ORAL
Qty: 30 TABLET | Refills: 0 | Status: SHIPPED | OUTPATIENT
Start: 2024-01-11

## 2024-01-11 NOTE — TELEPHONE ENCOUNTER
Pt is needing a refill of his sildenafil 20mg  Pharm is [facility] on [address]  Wife said that this script  in     Please advise

## 2024-01-12 DIAGNOSIS — N52.8 OTHER MALE ERECTILE DYSFUNCTION: ICD-10-CM

## 2024-01-12 DIAGNOSIS — R35.1 BPH ASSOCIATED WITH NOCTURIA: ICD-10-CM

## 2024-01-12 DIAGNOSIS — N40.1 BPH ASSOCIATED WITH NOCTURIA: ICD-10-CM

## 2024-01-12 RX ORDER — TADALAFIL 5 MG/1
TABLET ORAL
Qty: 90 TABLET | Refills: 0 | Status: SHIPPED | OUTPATIENT
Start: 2024-01-12

## 2024-01-16 RX ORDER — LOSARTAN POTASSIUM 25 MG/1
TABLET ORAL
Qty: 90 TABLET | Refills: 0 | Status: SHIPPED | OUTPATIENT
Start: 2024-01-16

## 2024-01-26 DIAGNOSIS — E11.8 TYPE 2 DIABETES MELLITUS WITH COMPLICATION, WITHOUT LONG-TERM CURRENT USE OF INSULIN (HCC): ICD-10-CM

## 2024-01-26 RX ORDER — EMPAGLIFLOZIN 25 MG/1
TABLET, FILM COATED ORAL
Qty: 90 TABLET | Refills: 1 | Status: SHIPPED | OUTPATIENT
Start: 2024-01-26

## 2024-02-27 RX ORDER — ATORVASTATIN CALCIUM 40 MG/1
TABLET, FILM COATED ORAL
Qty: 90 TABLET | Refills: 1 | Status: SHIPPED | OUTPATIENT
Start: 2024-02-27

## 2024-03-01 RX ORDER — GABAPENTIN 300 MG/1
CAPSULE ORAL
Qty: 60 CAPSULE | Refills: 2 | Status: SHIPPED | OUTPATIENT
Start: 2024-03-01 | End: 2024-05-30

## 2024-03-11 RX ORDER — CIPROFLOXACIN 500 MG/1
TABLET, FILM COATED ORAL
Qty: 20 TABLET | Refills: 0 | OUTPATIENT
Start: 2024-03-11

## 2024-03-14 DIAGNOSIS — E11.8 TYPE 2 DIABETES MELLITUS WITH COMPLICATION, WITHOUT LONG-TERM CURRENT USE OF INSULIN (HCC): ICD-10-CM

## 2024-03-14 RX ORDER — GLIPIZIDE 10 MG/1
TABLET ORAL
Qty: 180 TABLET | Refills: 0 | Status: SHIPPED | OUTPATIENT
Start: 2024-03-14

## 2024-03-14 RX ORDER — SITAGLIPTIN AND METFORMIN HYDROCHLORIDE 1000; 50 MG/1; MG/1
TABLET, FILM COATED ORAL
Qty: 180 TABLET | Refills: 0 | Status: SHIPPED | OUTPATIENT
Start: 2024-03-14

## 2024-03-18 ENCOUNTER — PATIENT MESSAGE (OUTPATIENT)
Dept: FAMILY MEDICINE CLINIC | Age: 74
End: 2024-03-18

## 2024-03-18 RX ORDER — CIPROFLOXACIN 500 MG/1
TABLET, FILM COATED ORAL
Qty: 20 TABLET | Refills: 0 | Status: SHIPPED | OUTPATIENT
Start: 2024-03-18

## 2024-04-10 RX ORDER — LOSARTAN POTASSIUM 25 MG/1
TABLET ORAL
Qty: 90 TABLET | Refills: 0 | Status: SHIPPED | OUTPATIENT
Start: 2024-04-10

## 2024-04-19 DIAGNOSIS — N52.8 OTHER MALE ERECTILE DYSFUNCTION: ICD-10-CM

## 2024-04-19 DIAGNOSIS — N40.1 BPH ASSOCIATED WITH NOCTURIA: ICD-10-CM

## 2024-04-19 DIAGNOSIS — R35.1 BPH ASSOCIATED WITH NOCTURIA: ICD-10-CM

## 2024-04-19 RX ORDER — TADALAFIL 5 MG/1
TABLET ORAL
Qty: 90 TABLET | Refills: 0 | Status: SHIPPED | OUTPATIENT
Start: 2024-04-19

## 2024-05-27 ASSESSMENT — PATIENT HEALTH QUESTIONNAIRE - PHQ9
SUM OF ALL RESPONSES TO PHQ QUESTIONS 1-9: 0
1. LITTLE INTEREST OR PLEASURE IN DOING THINGS: NOT AT ALL
2. FEELING DOWN, DEPRESSED OR HOPELESS: NOT AT ALL
1. LITTLE INTEREST OR PLEASURE IN DOING THINGS: NOT AT ALL
SUM OF ALL RESPONSES TO PHQ QUESTIONS 1-9: 0
SUM OF ALL RESPONSES TO PHQ9 QUESTIONS 1 & 2: 0
SUM OF ALL RESPONSES TO PHQ9 QUESTIONS 1 & 2: 0
2. FEELING DOWN, DEPRESSED OR HOPELESS: NOT AT ALL

## 2024-05-29 ENCOUNTER — OFFICE VISIT (OUTPATIENT)
Dept: FAMILY MEDICINE CLINIC | Age: 74
End: 2024-05-29
Payer: MEDICARE

## 2024-05-29 VITALS
RESPIRATION RATE: 16 BRPM | BODY MASS INDEX: 27.72 KG/M2 | DIASTOLIC BLOOD PRESSURE: 60 MMHG | SYSTOLIC BLOOD PRESSURE: 114 MMHG | HEART RATE: 55 BPM | OXYGEN SATURATION: 98 % | HEIGHT: 71 IN | WEIGHT: 198 LBS

## 2024-05-29 DIAGNOSIS — I10 PRIMARY HYPERTENSION: ICD-10-CM

## 2024-05-29 DIAGNOSIS — R19.7 DIARRHEA, UNSPECIFIED TYPE: ICD-10-CM

## 2024-05-29 DIAGNOSIS — E11.8 TYPE 2 DIABETES MELLITUS WITH COMPLICATION, WITHOUT LONG-TERM CURRENT USE OF INSULIN (HCC): Primary | ICD-10-CM

## 2024-05-29 DIAGNOSIS — E11.8 TYPE 2 DIABETES MELLITUS WITH COMPLICATION, WITHOUT LONG-TERM CURRENT USE OF INSULIN (HCC): ICD-10-CM

## 2024-05-29 DIAGNOSIS — I48.0 PAROXYSMAL ATRIAL FIBRILLATION (HCC): ICD-10-CM

## 2024-05-29 LAB
ALBUMIN SERPL-MCNC: 4.7 G/DL (ref 3.4–5)
ALBUMIN/GLOB SERPL: 1.9 {RATIO} (ref 1.1–2.2)
ALP SERPL-CCNC: 65 U/L (ref 40–129)
ALT SERPL-CCNC: 24 U/L (ref 10–40)
ANION GAP SERPL CALCULATED.3IONS-SCNC: 11 MMOL/L (ref 3–16)
AST SERPL-CCNC: 24 U/L (ref 15–37)
BILIRUB SERPL-MCNC: 0.5 MG/DL (ref 0–1)
BUN SERPL-MCNC: 24 MG/DL (ref 7–20)
CALCIUM SERPL-MCNC: 9.5 MG/DL (ref 8.3–10.6)
CHLORIDE SERPL-SCNC: 103 MMOL/L (ref 99–110)
CHOLEST SERPL-MCNC: 106 MG/DL (ref 0–199)
CO2 SERPL-SCNC: 25 MMOL/L (ref 21–32)
CREAT SERPL-MCNC: 1 MG/DL (ref 0.8–1.3)
CREAT UR-MCNC: 68.4 MG/DL (ref 39–259)
GFR SERPLBLD CREATININE-BSD FMLA CKD-EPI: 79 ML/MIN/{1.73_M2}
GLUCOSE SERPL-MCNC: 113 MG/DL (ref 70–99)
HBA1C MFR BLD: 6.8 %
HDLC SERPL-MCNC: 39 MG/DL (ref 40–60)
LDLC SERPL CALC-MCNC: 29 MG/DL
MICROALBUMIN UR DL<=1MG/L-MCNC: 1.5 MG/DL
MICROALBUMIN/CREAT UR: 21.9 MG/G (ref 0–30)
POTASSIUM SERPL-SCNC: 3.9 MMOL/L (ref 3.5–5.1)
PROT SERPL-MCNC: 7.2 G/DL (ref 6.4–8.2)
SODIUM SERPL-SCNC: 139 MMOL/L (ref 136–145)
TRIGL SERPL-MCNC: 192 MG/DL (ref 0–150)
VIT B12 SERPL-MCNC: 416 PG/ML (ref 211–911)
VLDLC SERPL CALC-MCNC: 38 MG/DL

## 2024-05-29 PROCEDURE — 2022F DILAT RTA XM EVC RTNOPTHY: CPT | Performed by: FAMILY MEDICINE

## 2024-05-29 PROCEDURE — 1036F TOBACCO NON-USER: CPT | Performed by: FAMILY MEDICINE

## 2024-05-29 PROCEDURE — 1123F ACP DISCUSS/DSCN MKR DOCD: CPT | Performed by: FAMILY MEDICINE

## 2024-05-29 PROCEDURE — 83036 HEMOGLOBIN GLYCOSYLATED A1C: CPT | Performed by: FAMILY MEDICINE

## 2024-05-29 PROCEDURE — 99214 OFFICE O/P EST MOD 30 MIN: CPT | Performed by: FAMILY MEDICINE

## 2024-05-29 PROCEDURE — 3044F HG A1C LEVEL LT 7.0%: CPT | Performed by: FAMILY MEDICINE

## 2024-05-29 PROCEDURE — 3074F SYST BP LT 130 MM HG: CPT | Performed by: FAMILY MEDICINE

## 2024-05-29 PROCEDURE — 3017F COLORECTAL CA SCREEN DOC REV: CPT | Performed by: FAMILY MEDICINE

## 2024-05-29 PROCEDURE — G8417 CALC BMI ABV UP PARAM F/U: HCPCS | Performed by: FAMILY MEDICINE

## 2024-05-29 PROCEDURE — G8427 DOCREV CUR MEDS BY ELIG CLIN: HCPCS | Performed by: FAMILY MEDICINE

## 2024-05-29 PROCEDURE — 3078F DIAST BP <80 MM HG: CPT | Performed by: FAMILY MEDICINE

## 2024-05-29 RX ORDER — CIPROFLOXACIN 500 MG/1
TABLET, FILM COATED ORAL
Qty: 20 TABLET | Refills: 2 | Status: SHIPPED | OUTPATIENT
Start: 2024-05-29

## 2024-05-29 RX ORDER — CHOLESTYRAMINE 4 G/9G
1 POWDER, FOR SUSPENSION ORAL 2 TIMES DAILY
Qty: 180 PACKET | Refills: 1 | Status: SHIPPED | OUTPATIENT
Start: 2024-05-29

## 2024-05-29 NOTE — PROGRESS NOTES
mouth 2 times daily      zinc gluconate 50 MG tablet Take 1 tablet by mouth daily      niacin 500 MG tablet Take 1 tablet by mouth every evening      ascorbic acid (VITAMIN C) 250 MG tablet Take 1 tablet by mouth daily      acetaminophen (TYLENOL) 325 MG tablet Take 2 tablets by mouth every 6 hours as needed for Pain      Cholecalciferol (VITAMIN D-3) 5000 UNITS TABS Take 1 tablet by mouth daily      vitamin E 1000 UNITS capsule Take 1 capsule by mouth daily       No current facility-administered medications for this visit.     /60   Pulse 55   Resp 16   Ht 1.803 m (5' 11\")   Wt 89.8 kg (198 lb)   SpO2 98%   BMI 27.62 kg/m²     Physical Exam    Wt Readings from Last 3 Encounters:   05/29/24 89.8 kg (198 lb)   11/29/23 90.7 kg (200 lb)   05/31/23 90.7 kg (200 lb)     BP Readings from Last 3 Encounters:   05/29/24 114/60   11/29/23 136/80   05/31/23 126/80     Assessment/Plan:  1. Type 2 diabetes mellitus with complication, without long-term current use of insulin (HCC)  A1c improved to 6.8, continue current regimen  - POCT glycosylated hemoglobin (Hb A1C)  - Comprehensive Metabolic Panel; Future  - Lipid Panel; Future  - Vitamin B12; Future  - Microalbumin / Creatinine Urine Ratio; Future    2. Primary hypertension  Well controlled on current regimen. No side effects from medications. Advise low salt diet and routine exercise    3. Paroxysmal atrial fibrillation (HCC)  Continue metoprolol and Eliquis    4. Diarrhea, unspecified type  Start cholestyramine  - cholestyramine (QUESTRAN) 4 g packet; Take 1 packet by mouth 2 times daily  Dispense: 180 packet; Refill: 1

## 2024-06-05 ENCOUNTER — OFFICE VISIT (OUTPATIENT)
Dept: DERMATOLOGY | Age: 74
End: 2024-06-05
Payer: MEDICARE

## 2024-06-05 DIAGNOSIS — L81.4 SOLAR LENTIGINOSIS: ICD-10-CM

## 2024-06-05 DIAGNOSIS — L57.0 ACTINIC KERATOSIS: Primary | ICD-10-CM

## 2024-06-05 DIAGNOSIS — Z85.828 HISTORY OF BASAL CELL CARCINOMA: ICD-10-CM

## 2024-06-05 PROCEDURE — 17000 DESTRUCT PREMALG LESION: CPT | Performed by: DERMATOLOGY

## 2024-06-05 PROCEDURE — 3017F COLORECTAL CA SCREEN DOC REV: CPT | Performed by: DERMATOLOGY

## 2024-06-05 PROCEDURE — G8427 DOCREV CUR MEDS BY ELIG CLIN: HCPCS | Performed by: DERMATOLOGY

## 2024-06-05 PROCEDURE — 1123F ACP DISCUSS/DSCN MKR DOCD: CPT | Performed by: DERMATOLOGY

## 2024-06-05 PROCEDURE — 99213 OFFICE O/P EST LOW 20 MIN: CPT | Performed by: DERMATOLOGY

## 2024-06-05 PROCEDURE — 1036F TOBACCO NON-USER: CPT | Performed by: DERMATOLOGY

## 2024-06-05 PROCEDURE — 17003 DESTRUCT PREMALG LES 2-14: CPT | Performed by: DERMATOLOGY

## 2024-06-05 PROCEDURE — G8417 CALC BMI ABV UP PARAM F/U: HCPCS | Performed by: DERMATOLOGY

## 2024-06-05 NOTE — PROGRESS NOTES
BIOPSY performed by Joshua Keller MD at [facility] END[facility]    HERNIA REPAIR      LUMBAR SPINE SURGERY N/A 5/25/2022    DECOMPRESSIVE LUMBAR LAMINECTOMY L3, L4, L5 performed by Chaitanya Belcher MD at [facility] OR    MOHS SURGERY  10/02/2018    right nasal tip    TONSILLECTOMY         Allergies   Allergen Reactions    No Known Allergies      Outpatient Medications Marked as Taking for the 6/5/24 encounter (Office Visit) with Mike Lang MD   Medication Sig Dispense Refill    cholestyramine (QUESTRAN) 4 g packet Take 1 packet by mouth 2 times daily 180 packet 1    ciprofloxacin (CIPRO) 500 MG tablet Take 1 tablet by mouth twice daily for 10 days 20 tablet 2    tadalafil (CIALIS) 5 MG tablet TAKE 1 TABLET BY MOUTH ONCE DAILY AS NEEDED FOR ERECTILE DYSFUNCTION 90 tablet 0    losartan (COZAAR) 25 MG tablet Take 1 tablet by mouth once daily 90 tablet 0    JANUMET  MG per tablet TAKE 1 TABLET BY MOUTH TWICE DAILY WITH MEALS 180 tablet 0    glipiZIDE (GLUCOTROL) 10 MG tablet TAKE 1 TABLET BY MOUTH TWICE DAILY BEFORE MEAL(S) 180 tablet 0    atorvastatin (LIPITOR) 40 MG tablet Take 1 tablet by mouth once daily 90 tablet 1    empagliflozin (JARDIANCE) 25 MG tablet Take 1 tablet by mouth once daily 90 tablet 1    sildenafil (REVATIO) 20 MG tablet TAKE 4 TO 5 TABLETS BY MOUTH ONCE DAILY AS NEEDED 30 tablet 0    Misc Natural Products (NEURIVA PO) Take by mouth      sildenafil (VIAGRA) 100 MG tablet Take 1 tablet by mouth as needed for Erectile Dysfunction      Vitamin A 2400 MCG (8000 UT) CAPS Take by mouth      Melatonin 10 MG TABS Take by mouth      b complex vitamins capsule Take 1 capsule by mouth daily      metoprolol succinate (TOPROL XL) 25 MG extended release tablet TAKE 1 TABLET BY MOUTH ONCE DAILY      apixaban (ELIQUIS) 5 MG TABS tablet Take by mouth 2 times daily      zinc gluconate 50 MG tablet Take 1 tablet by mouth daily      niacin 500 MG tablet Take 1 tablet by mouth every evening      ascorbic acid

## 2024-06-10 DIAGNOSIS — E11.8 TYPE 2 DIABETES MELLITUS WITH COMPLICATION, WITHOUT LONG-TERM CURRENT USE OF INSULIN (HCC): ICD-10-CM

## 2024-06-10 RX ORDER — GLIPIZIDE 10 MG/1
TABLET ORAL
Qty: 180 TABLET | Refills: 0 | Status: SHIPPED | OUTPATIENT
Start: 2024-06-10

## 2024-06-10 RX ORDER — GABAPENTIN 300 MG/1
CAPSULE ORAL
Qty: 180 CAPSULE | Refills: 0 | Status: SHIPPED | OUTPATIENT
Start: 2024-06-10 | End: 2024-09-08

## 2024-06-10 RX ORDER — SITAGLIPTIN AND METFORMIN HYDROCHLORIDE 1000; 50 MG/1; MG/1
TABLET, FILM COATED ORAL
Qty: 180 TABLET | Refills: 0 | Status: SHIPPED | OUTPATIENT
Start: 2024-06-10

## 2024-06-11 DIAGNOSIS — N40.0 BPH WITHOUT URINARY OBSTRUCTION: Primary | ICD-10-CM

## 2024-06-13 DIAGNOSIS — N40.0 BPH WITHOUT URINARY OBSTRUCTION: ICD-10-CM

## 2024-06-13 LAB — PSA SERPL DL<=0.01 NG/ML-MCNC: 2 NG/ML (ref 0–4)

## 2024-07-05 RX ORDER — LOSARTAN POTASSIUM 25 MG/1
TABLET ORAL
Qty: 90 TABLET | Refills: 0 | Status: SHIPPED | OUTPATIENT
Start: 2024-07-05

## 2024-07-19 DIAGNOSIS — E11.8 TYPE 2 DIABETES MELLITUS WITH COMPLICATION, WITHOUT LONG-TERM CURRENT USE OF INSULIN (HCC): ICD-10-CM

## 2024-07-19 DIAGNOSIS — R35.1 BPH ASSOCIATED WITH NOCTURIA: ICD-10-CM

## 2024-07-19 DIAGNOSIS — N40.1 BPH ASSOCIATED WITH NOCTURIA: ICD-10-CM

## 2024-07-19 DIAGNOSIS — N52.8 OTHER MALE ERECTILE DYSFUNCTION: ICD-10-CM

## 2024-07-19 RX ORDER — EMPAGLIFLOZIN 25 MG/1
TABLET, FILM COATED ORAL
Qty: 90 TABLET | Refills: 0 | Status: SHIPPED | OUTPATIENT
Start: 2024-07-19

## 2024-07-22 RX ORDER — TADALAFIL 5 MG/1
TABLET ORAL
Qty: 90 TABLET | Refills: 0 | Status: SHIPPED | OUTPATIENT
Start: 2024-07-22

## 2024-08-21 RX ORDER — ATORVASTATIN CALCIUM 40 MG/1
TABLET, FILM COATED ORAL
Qty: 90 TABLET | Refills: 0 | Status: SHIPPED | OUTPATIENT
Start: 2024-08-21

## 2024-09-04 DIAGNOSIS — E11.8 TYPE 2 DIABETES MELLITUS WITH COMPLICATION, WITHOUT LONG-TERM CURRENT USE OF INSULIN (HCC): ICD-10-CM

## 2024-09-04 RX ORDER — SITAGLIPTIN AND METFORMIN HYDROCHLORIDE 1000; 50 MG/1; MG/1
TABLET, FILM COATED ORAL
Qty: 180 TABLET | Refills: 0 | Status: SHIPPED | OUTPATIENT
Start: 2024-09-04

## 2024-09-04 RX ORDER — GLIPIZIDE 10 MG/1
TABLET ORAL
Qty: 180 TABLET | Refills: 0 | Status: SHIPPED | OUTPATIENT
Start: 2024-09-04

## 2024-09-11 RX ORDER — AZITHROMYCIN 250 MG/1
TABLET, FILM COATED ORAL
Qty: 6 TABLET | Refills: 0 | Status: SHIPPED | OUTPATIENT
Start: 2024-09-11

## 2024-09-16 RX ORDER — GABAPENTIN 300 MG/1
300 CAPSULE ORAL 2 TIMES DAILY
Qty: 180 CAPSULE | Refills: 0 | Status: SHIPPED | OUTPATIENT
Start: 2024-09-16 | End: 2024-12-15

## 2024-10-15 DIAGNOSIS — E11.8 TYPE 2 DIABETES MELLITUS WITH COMPLICATION, WITHOUT LONG-TERM CURRENT USE OF INSULIN (HCC): ICD-10-CM

## 2024-10-15 RX ORDER — EMPAGLIFLOZIN 25 MG/1
TABLET, FILM COATED ORAL
Qty: 90 TABLET | Refills: 0 | Status: SHIPPED | OUTPATIENT
Start: 2024-10-15

## 2024-10-18 DIAGNOSIS — N40.1 BPH ASSOCIATED WITH NOCTURIA: ICD-10-CM

## 2024-10-18 DIAGNOSIS — R35.1 BPH ASSOCIATED WITH NOCTURIA: ICD-10-CM

## 2024-10-18 DIAGNOSIS — N52.8 OTHER MALE ERECTILE DYSFUNCTION: ICD-10-CM

## 2024-10-18 RX ORDER — TADALAFIL 5 MG/1
TABLET ORAL
Qty: 90 TABLET | Refills: 0 | Status: SHIPPED | OUTPATIENT
Start: 2024-10-18

## 2024-11-05 RX ORDER — LOSARTAN POTASSIUM 25 MG/1
TABLET ORAL
Qty: 90 TABLET | Refills: 0 | Status: SHIPPED | OUTPATIENT
Start: 2024-11-05

## 2024-11-15 RX ORDER — ATORVASTATIN CALCIUM 40 MG/1
TABLET, FILM COATED ORAL
Qty: 90 TABLET | Refills: 0 | Status: SHIPPED | OUTPATIENT
Start: 2024-11-15

## 2024-11-29 DIAGNOSIS — E11.8 TYPE 2 DIABETES MELLITUS WITH COMPLICATION, WITHOUT LONG-TERM CURRENT USE OF INSULIN (HCC): ICD-10-CM

## 2024-11-29 RX ORDER — GLIPIZIDE 10 MG/1
TABLET ORAL
Qty: 180 TABLET | Refills: 0 | Status: SHIPPED | OUTPATIENT
Start: 2024-11-29

## 2024-11-29 RX ORDER — SITAGLIPTIN AND METFORMIN HYDROCHLORIDE 1000; 50 MG/1; MG/1
TABLET, FILM COATED ORAL
Qty: 180 TABLET | Refills: 0 | Status: SHIPPED | OUTPATIENT
Start: 2024-11-29

## 2024-12-04 ENCOUNTER — OFFICE VISIT (OUTPATIENT)
Dept: FAMILY MEDICINE CLINIC | Age: 74
End: 2024-12-04

## 2024-12-04 VITALS
BODY MASS INDEX: 27.72 KG/M2 | RESPIRATION RATE: 16 BRPM | DIASTOLIC BLOOD PRESSURE: 80 MMHG | WEIGHT: 198 LBS | SYSTOLIC BLOOD PRESSURE: 124 MMHG | HEIGHT: 71 IN | OXYGEN SATURATION: 95 % | HEART RATE: 58 BPM

## 2024-12-04 DIAGNOSIS — E11.8 TYPE 2 DIABETES MELLITUS WITH COMPLICATION, WITHOUT LONG-TERM CURRENT USE OF INSULIN (HCC): Primary | ICD-10-CM

## 2024-12-04 DIAGNOSIS — I48.0 PAROXYSMAL ATRIAL FIBRILLATION (HCC): ICD-10-CM

## 2024-12-04 DIAGNOSIS — R19.7 DIARRHEA, UNSPECIFIED TYPE: ICD-10-CM

## 2024-12-04 DIAGNOSIS — I10 PRIMARY HYPERTENSION: ICD-10-CM

## 2024-12-04 LAB — HBA1C MFR BLD: 7.4 %

## 2024-12-04 RX ORDER — CIPROFLOXACIN 500 MG/1
TABLET, FILM COATED ORAL
Qty: 20 TABLET | Refills: 2 | Status: SHIPPED | OUTPATIENT
Start: 2024-12-04

## 2024-12-04 RX ORDER — CHOLESTYRAMINE 4 G/9G
1 POWDER, FOR SUSPENSION ORAL 2 TIMES DAILY
Qty: 180 PACKET | Refills: 1
Start: 2024-12-04

## 2024-12-04 SDOH — ECONOMIC STABILITY: FOOD INSECURITY: WITHIN THE PAST 12 MONTHS, YOU WORRIED THAT YOUR FOOD WOULD RUN OUT BEFORE YOU GOT MONEY TO BUY MORE.: NEVER TRUE

## 2024-12-04 SDOH — ECONOMIC STABILITY: FOOD INSECURITY: WITHIN THE PAST 12 MONTHS, THE FOOD YOU BOUGHT JUST DIDN'T LAST AND YOU DIDN'T HAVE MONEY TO GET MORE.: NEVER TRUE

## 2024-12-04 SDOH — ECONOMIC STABILITY: INCOME INSECURITY: HOW HARD IS IT FOR YOU TO PAY FOR THE VERY BASICS LIKE FOOD, HOUSING, MEDICAL CARE, AND HEATING?: NOT HARD AT ALL

## 2024-12-04 NOTE — PROGRESS NOTES
UT) CAPS Take by mouth      Melatonin 10 MG TABS Take by mouth      b complex vitamins capsule Take 1 capsule by mouth daily      metoprolol succinate (TOPROL XL) 25 MG extended release tablet TAKE 1 TABLET BY MOUTH ONCE DAILY      apixaban (ELIQUIS) 5 MG TABS tablet Take by mouth 2 times daily      zinc gluconate 50 MG tablet Take 1 tablet by mouth daily      niacin 500 MG tablet Take 1 tablet by mouth every evening      ascorbic acid (VITAMIN C) 250 MG tablet Take 1 tablet by mouth daily      acetaminophen (TYLENOL) 325 MG tablet Take 2 tablets by mouth every 6 hours as needed for Pain      Cholecalciferol (VITAMIN D-3) 5000 UNITS TABS Take 1 tablet by mouth daily      vitamin E 1000 UNITS capsule Take 1 capsule by mouth daily       No current facility-administered medications for this visit.     /80   Pulse 58   Resp 16   Ht 1.803 m (5' 11\")   Wt 89.8 kg (198 lb)   SpO2 95%   BMI 27.62 kg/m²     Physical Exam    Wt Readings from Last 3 Encounters:   12/04/24 89.8 kg (198 lb)   05/29/24 89.8 kg (198 lb)   11/29/23 90.7 kg (200 lb)     BP Readings from Last 3 Encounters:   12/04/24 124/80   05/29/24 114/60   11/29/23 136/80     Assessment/Plan:  1. Type 2 diabetes mellitus with complication, without long-term current use of insulin (HCC)  A1c up from 6.8 now to 7.4.  Significant GI side effects with metformin.  Stop Janumet and switch to Januvia only.  Continue other medications  - POCT glycosylated hemoglobin (Hb A1C)  - SITagliptin (JANUVIA) 100 MG tablet; Take 1 tablet by mouth daily  Dispense: 90 tablet; Refill: 1    2. Primary hypertension  Well controlled on current regimen. No side effects from medications. Advise low salt diet and routine exercise    3. Paroxysmal atrial fibrillation (HCC)  Continue Eliquis and metoprolol    F/u 6 months

## 2024-12-05 ENCOUNTER — OFFICE VISIT (OUTPATIENT)
Dept: DERMATOLOGY | Age: 74
End: 2024-12-05
Payer: MEDICARE

## 2024-12-05 DIAGNOSIS — D22.9 MULTIPLE NEVI: ICD-10-CM

## 2024-12-05 DIAGNOSIS — L57.0 ACTINIC KERATOSIS: Primary | ICD-10-CM

## 2024-12-05 PROCEDURE — G8417 CALC BMI ABV UP PARAM F/U: HCPCS | Performed by: DERMATOLOGY

## 2024-12-05 PROCEDURE — 1123F ACP DISCUSS/DSCN MKR DOCD: CPT | Performed by: DERMATOLOGY

## 2024-12-05 PROCEDURE — G8484 FLU IMMUNIZE NO ADMIN: HCPCS | Performed by: DERMATOLOGY

## 2024-12-05 PROCEDURE — 17003 DESTRUCT PREMALG LES 2-14: CPT | Performed by: DERMATOLOGY

## 2024-12-05 PROCEDURE — 1159F MED LIST DOCD IN RCRD: CPT | Performed by: DERMATOLOGY

## 2024-12-05 PROCEDURE — 1160F RVW MEDS BY RX/DR IN RCRD: CPT | Performed by: DERMATOLOGY

## 2024-12-05 PROCEDURE — 17000 DESTRUCT PREMALG LESION: CPT | Performed by: DERMATOLOGY

## 2024-12-05 PROCEDURE — 1036F TOBACCO NON-USER: CPT | Performed by: DERMATOLOGY

## 2024-12-05 PROCEDURE — 3017F COLORECTAL CA SCREEN DOC REV: CPT | Performed by: DERMATOLOGY

## 2024-12-05 PROCEDURE — 99212 OFFICE O/P EST SF 10 MIN: CPT | Performed by: DERMATOLOGY

## 2024-12-05 PROCEDURE — G8427 DOCREV CUR MEDS BY ELIG CLIN: HCPCS | Performed by: DERMATOLOGY

## 2024-12-05 NOTE — PROGRESS NOTES
[facility]  Mike Lang MD  [phone]      Steven M Bleser, Dr.  1950    74 y.o. male     Date of Visit: 12/5/2024    Chief Complaint: skin lesions    History of Present Illness:    1.  He has multiple persistent scaly lesions on the scalp and face.     2.  He also presents today for evaluation of multiple moles - not aware of any changes in size, color or shape.       Derm Hx:      He also has a history of chronic acne rosacea - [employer] rosacea triple cream with azelaic acid 15%, ivermectin 1% and metronidazole 1% cream.     Superficial BCC of the right mid shin - treated with curettage on 12/8/23.   Small nodular and superficial BCC of the left forearm-treated with electrodesiccation and curettage on 4/29/2022.  Nodular basal cell carcinoma of the left postauricular region-treated with Mohs by Dr. Vidales on 1/21/2020.  Nodular BCC on the left central cheek-treated with Mohs by Dr. Vidales on 8/6/2019.  Nodular BCC on the R nasal tip-treated with Mohs by Dr. Vidales on 10/2/2018.  Nodular BCC of the left nasal ala-treated with Mohs by Dr. Vazquez in April 2014.  Superficial BCC of the right shoulder-treated with curettage in April 2014.  BCC of the right mid cheek-treated with Mohs by Dr. Vazquez in Sept 2012.  Pigmented nodular BCC the right mandible-treated with Mohs in Dec 2011.  Superficial BCC of the right cheek-treated with Mohs by Dr. Vazquez in December 2009.     On Eliquis.     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   Has a home in [place].        Review of Systems:  Gen: Feels well, good sense of health.    Past Medical History, Family History, Surgical History, Medications and Allergies reviewed.    Past Medical History:   Diagnosis Date    Cancer (HCC)     NBCC R nasal tip    Diabetes mellitus (HCC)     Fracture cervical vertebra-closed (HCC) 3/16/13     Past Surgical History:   Procedure Laterality Date    COLONOSCOPY N/A 1/22/2021    COLONOSCOPY WITH

## 2024-12-16 RX ORDER — GABAPENTIN 300 MG/1
300 CAPSULE ORAL 2 TIMES DAILY
Qty: 180 CAPSULE | Refills: 0 | Status: SHIPPED | OUTPATIENT
Start: 2024-12-16 | End: 2025-03-16

## 2025-01-14 DIAGNOSIS — E11.8 TYPE 2 DIABETES MELLITUS WITH COMPLICATION, WITHOUT LONG-TERM CURRENT USE OF INSULIN (HCC): ICD-10-CM

## 2025-01-14 RX ORDER — EMPAGLIFLOZIN 25 MG/1
TABLET, FILM COATED ORAL
Qty: 90 TABLET | Refills: 1 | Status: SHIPPED | OUTPATIENT
Start: 2025-01-14

## 2025-01-17 DIAGNOSIS — R35.1 BPH ASSOCIATED WITH NOCTURIA: ICD-10-CM

## 2025-01-17 DIAGNOSIS — N40.1 BPH ASSOCIATED WITH NOCTURIA: ICD-10-CM

## 2025-01-17 DIAGNOSIS — N52.8 OTHER MALE ERECTILE DYSFUNCTION: ICD-10-CM

## 2025-01-17 RX ORDER — TADALAFIL 5 MG/1
TABLET ORAL
Qty: 90 TABLET | Refills: 0 | Status: SHIPPED | OUTPATIENT
Start: 2025-01-17

## 2025-01-22 ENCOUNTER — TELEPHONE (OUTPATIENT)
Dept: ADMINISTRATIVE | Age: 75
End: 2025-01-22

## 2025-01-22 NOTE — TELEPHONE ENCOUNTER
Submitted PA for Tadalafil Via [facility] Key: [id number] STATUS: APPROVED 1/1/2025-12/31/2025.    Authorization Expiration Date: 12/30/2025.    If this requires a response please respond to the pool ( P MHCX P[facility] MEDICATION PRE-AUTH).      Thank you please advise patient.

## 2025-01-29 RX ORDER — LOSARTAN POTASSIUM 25 MG/1
TABLET ORAL
Qty: 90 TABLET | Refills: 1 | Status: SHIPPED | OUTPATIENT
Start: 2025-01-29

## 2025-02-11 RX ORDER — ATORVASTATIN CALCIUM 40 MG/1
TABLET, FILM COATED ORAL
Qty: 90 TABLET | Refills: 0 | Status: SHIPPED | OUTPATIENT
Start: 2025-02-11

## 2025-02-25 DIAGNOSIS — E11.8 TYPE 2 DIABETES MELLITUS WITH COMPLICATION, WITHOUT LONG-TERM CURRENT USE OF INSULIN (HCC): ICD-10-CM

## 2025-02-25 RX ORDER — GLIPIZIDE 10 MG/1
TABLET ORAL
Qty: 180 TABLET | Refills: 0 | Status: SHIPPED | OUTPATIENT
Start: 2025-02-25

## 2025-03-10 RX ORDER — GABAPENTIN 300 MG/1
300 CAPSULE ORAL 2 TIMES DAILY
Qty: 180 CAPSULE | Refills: 0 | Status: SHIPPED | OUTPATIENT
Start: 2025-03-10 | End: 2025-06-08

## 2025-04-19 DIAGNOSIS — N40.1 BPH ASSOCIATED WITH NOCTURIA: ICD-10-CM

## 2025-04-19 DIAGNOSIS — R35.1 BPH ASSOCIATED WITH NOCTURIA: ICD-10-CM

## 2025-04-19 DIAGNOSIS — N52.8 OTHER MALE ERECTILE DYSFUNCTION: ICD-10-CM

## 2025-04-21 RX ORDER — TADALAFIL 5 MG/1
TABLET ORAL
Qty: 90 TABLET | Refills: 0 | Status: SHIPPED | OUTPATIENT
Start: 2025-04-21

## 2025-05-07 RX ORDER — ATORVASTATIN CALCIUM 40 MG/1
40 TABLET, FILM COATED ORAL DAILY
Qty: 90 TABLET | Refills: 0 | Status: SHIPPED | OUTPATIENT
Start: 2025-05-07

## 2025-05-27 DIAGNOSIS — E11.8 TYPE 2 DIABETES MELLITUS WITH COMPLICATION, WITHOUT LONG-TERM CURRENT USE OF INSULIN (HCC): ICD-10-CM

## 2025-05-27 RX ORDER — GLIPIZIDE 10 MG/1
TABLET ORAL
Qty: 180 TABLET | Refills: 0 | Status: SHIPPED | OUTPATIENT
Start: 2025-05-27

## 2025-06-04 ENCOUNTER — RESULTS FOLLOW-UP (OUTPATIENT)
Dept: FAMILY MEDICINE CLINIC | Age: 75
End: 2025-06-04

## 2025-06-04 ENCOUNTER — OFFICE VISIT (OUTPATIENT)
Dept: FAMILY MEDICINE CLINIC | Age: 75
End: 2025-06-04

## 2025-06-04 VITALS
DIASTOLIC BLOOD PRESSURE: 64 MMHG | BODY MASS INDEX: 27.38 KG/M2 | RESPIRATION RATE: 18 BRPM | WEIGHT: 195.6 LBS | HEIGHT: 71 IN | SYSTOLIC BLOOD PRESSURE: 108 MMHG

## 2025-06-04 DIAGNOSIS — N40.0 BPH WITHOUT URINARY OBSTRUCTION: ICD-10-CM

## 2025-06-04 DIAGNOSIS — I48.0 PAROXYSMAL ATRIAL FIBRILLATION (HCC): ICD-10-CM

## 2025-06-04 DIAGNOSIS — E11.8 TYPE 2 DIABETES MELLITUS WITH COMPLICATION, WITHOUT LONG-TERM CURRENT USE OF INSULIN (HCC): ICD-10-CM

## 2025-06-04 DIAGNOSIS — E03.8 SUBCLINICAL HYPOTHYROIDISM: ICD-10-CM

## 2025-06-04 DIAGNOSIS — Z87.438 HISTORY OF PROSTATITIS: ICD-10-CM

## 2025-06-04 DIAGNOSIS — Z00.00 MEDICARE ANNUAL WELLNESS VISIT, SUBSEQUENT: Primary | ICD-10-CM

## 2025-06-04 DIAGNOSIS — I10 PRIMARY HYPERTENSION: ICD-10-CM

## 2025-06-04 LAB
ALBUMIN SERPL-MCNC: 4.4 G/DL (ref 3.4–5)
ALBUMIN/GLOB SERPL: 1.8 {RATIO} (ref 1.1–2.2)
ALP SERPL-CCNC: 75 U/L (ref 40–129)
ALT SERPL-CCNC: 29 U/L (ref 10–40)
ANION GAP SERPL CALCULATED.3IONS-SCNC: 12 MMOL/L (ref 3–16)
AST SERPL-CCNC: 35 U/L (ref 15–37)
BASOPHILS # BLD: 0 K/UL (ref 0–0.2)
BASOPHILS NFR BLD: 0.5 %
BILIRUB SERPL-MCNC: 0.6 MG/DL (ref 0–1)
BUN SERPL-MCNC: 24 MG/DL (ref 7–20)
CALCIUM SERPL-MCNC: 9.4 MG/DL (ref 8.3–10.6)
CHLORIDE SERPL-SCNC: 106 MMOL/L (ref 99–110)
CHOLEST SERPL-MCNC: 101 MG/DL (ref 0–199)
CO2 SERPL-SCNC: 22 MMOL/L (ref 21–32)
CREAT SERPL-MCNC: 1.1 MG/DL (ref 0.8–1.3)
CREAT UR-MCNC: 91.2 MG/DL (ref 39–259)
DEPRECATED RDW RBC AUTO: 15.2 % (ref 12.4–15.4)
EOSINOPHIL # BLD: 0.2 K/UL (ref 0–0.6)
EOSINOPHIL NFR BLD: 3.6 %
GFR SERPLBLD CREATININE-BSD FMLA CKD-EPI: 70 ML/MIN/{1.73_M2}
GLUCOSE SERPL-MCNC: 98 MG/DL (ref 70–99)
HBA1C MFR BLD: 6.7 %
HCT VFR BLD AUTO: 40.4 % (ref 40.5–52.5)
HDLC SERPL-MCNC: 42 MG/DL (ref 40–60)
HGB BLD-MCNC: 13.5 G/DL (ref 13.5–17.5)
LDLC SERPL CALC-MCNC: 38 MG/DL
LYMPHOCYTES # BLD: 1.2 K/UL (ref 1–5.1)
LYMPHOCYTES NFR BLD: 18.6 %
MCH RBC QN AUTO: 30.1 PG (ref 26–34)
MCHC RBC AUTO-ENTMCNC: 33.4 G/DL (ref 31–36)
MCV RBC AUTO: 90 FL (ref 80–100)
MICROALBUMIN UR DL<=1MG/L-MCNC: 3.27 MG/DL
MICROALBUMIN/CREAT UR: 35.9 MG/G (ref 0–30)
MONOCYTES # BLD: 0.6 K/UL (ref 0–1.3)
MONOCYTES NFR BLD: 9.4 %
NEUTROPHILS # BLD: 4.2 K/UL (ref 1.7–7.7)
NEUTROPHILS NFR BLD: 67.9 %
PLATELET # BLD AUTO: 158 K/UL (ref 135–450)
PMV BLD AUTO: 8.3 FL (ref 5–10.5)
POTASSIUM SERPL-SCNC: 4.3 MMOL/L (ref 3.5–5.1)
PROT SERPL-MCNC: 6.9 G/DL (ref 6.4–8.2)
PSA SERPL DL<=0.01 NG/ML-MCNC: 1.79 NG/ML (ref 0–4)
RBC # BLD AUTO: 4.49 M/UL (ref 4.2–5.9)
SODIUM SERPL-SCNC: 140 MMOL/L (ref 136–145)
TRIGL SERPL-MCNC: 107 MG/DL (ref 0–150)
VIT B12 SERPL-MCNC: 398 PG/ML (ref 211–911)
VLDLC SERPL CALC-MCNC: 21 MG/DL
WBC # BLD AUTO: 6.2 K/UL (ref 4–11)

## 2025-06-04 RX ORDER — SITAGLIPTIN AND METFORMIN HYDROCHLORIDE 1000; 50 MG/1; MG/1
1 TABLET, FILM COATED, EXTENDED RELEASE ORAL 2 TIMES DAILY
Qty: 180 TABLET | Refills: 1 | Status: SHIPPED | OUTPATIENT
Start: 2025-06-04

## 2025-06-04 SDOH — ECONOMIC STABILITY: FOOD INSECURITY: WITHIN THE PAST 12 MONTHS, YOU WORRIED THAT YOUR FOOD WOULD RUN OUT BEFORE YOU GOT MONEY TO BUY MORE.: NEVER TRUE

## 2025-06-04 SDOH — ECONOMIC STABILITY: FOOD INSECURITY: WITHIN THE PAST 12 MONTHS, THE FOOD YOU BOUGHT JUST DIDN'T LAST AND YOU DIDN'T HAVE MONEY TO GET MORE.: NEVER TRUE

## 2025-06-04 ASSESSMENT — PATIENT HEALTH QUESTIONNAIRE - PHQ9
SUM OF ALL RESPONSES TO PHQ QUESTIONS 1-9: 0
SUM OF ALL RESPONSES TO PHQ QUESTIONS 1-9: 0
2. FEELING DOWN, DEPRESSED OR HOPELESS: NOT AT ALL
SUM OF ALL RESPONSES TO PHQ QUESTIONS 1-9: 0
1. LITTLE INTEREST OR PLEASURE IN DOING THINGS: NOT AT ALL
SUM OF ALL RESPONSES TO PHQ QUESTIONS 1-9: 0

## 2025-06-04 ASSESSMENT — LIFESTYLE VARIABLES
HOW MANY STANDARD DRINKS CONTAINING ALCOHOL DO YOU HAVE ON A TYPICAL DAY: 1 OR 2
HOW OFTEN DO YOU HAVE A DRINK CONTAINING ALCOHOL: 2-3 TIMES A WEEK

## 2025-06-04 NOTE — PATIENT INSTRUCTIONS
Eating Healthy Foods: Care Instructions  With every meal, you can make healthy food choices. Try to eat a variety of fruits, vegetables, whole grains, lean proteins, and low-fat dairy products. This can help you get the right balance of nutrients, including vitamins and minerals. Small changes add up over time. You can start by adding one healthy food to your meals each day.    Try to make half your plate fruits and vegetables, one-fourth whole grains, and one-fourth lean proteins. Try including dairy with your meals.   Eat more fruits and vegetables. Try to have them with most meals and snacks.   Foods for healthy eating        Fruits   These can be fresh, frozen, canned, or dried.  Try to choose whole fruit rather than fruit juice.  Eat a variety of colors.        Vegetables   These can be fresh, frozen, canned, or dried.  Beans, peas, and lentils count too.        Whole grains   Choose whole-grain breads, cereals, and noodles.  Try brown rice.        Lean proteins   These can include lean meat, poultry, fish, and eggs.  You can also have tofu, beans, peas, lentils, nuts, and seeds.        Dairy   Try milk, yogurt, and cheese.  Choose low-fat or fat-free when you can.  If you need to, use lactose-free milk or fortified plant-based milk products, such as soy milk.        Water   Drink water when you're thirsty.  Limit sugar-sweetened drinks, including soda, fruit drinks, and sports drinks.  Where can you learn more?  Go to https://www.[employer].net/patientEd and enter T756 to learn more about \"Eating Healthy Foods: Care Instructions.\"  Current as of: October 7, 2024  Content Version: 14.5  © 9149-2668 [employer].   Care instructions adapted under license by [employer]. If you have questions about a medical condition or this instruction, always ask your healthcare professional. Department of Health and Human Services, [employer], disclaims any warranty or liability for your use of this information.         A Healthy Heart:

## 2025-06-04 NOTE — PROGRESS NOTES
Medicare Annual Wellness Visit    Name: Steven M Bleser, Dr.  YOB: 1950  Age: 74 y.o.  Sex: male  Race/ethnicity: {Race/ethnicity:57326}  MRN: [id number]     Date of Service:  6/4/2025    Patient Active Problem List   Diagnosis    Cervical spine fracture (HCC)    HTN (hypertension)    History of nephrolithiasis    History of basal cell carcinoma    History of prostatitis    Type 2 diabetes mellitus with complication, without long-term current use of insulin (HCC)    Subclinical hypothyroidism    Other male erectile dysfunction    Rosacea    Basal cell carcinoma of left cheek    DDD (degenerative disc disease), cervical    History of colonoscopy with polypectomy - 2014, 202, Q5 yr    Cauda equina syndrome not affecting bladder (HCC)    Paroxysmal atrial fibrillation (HCC)     Health Maintenance   Topic Date Due    Diabetic retinal exam  Never done    Hepatitis C screen  Never done    Respiratory Syncytial Virus (RSV) Pregnant or age 60 yrs+ (1 - Risk 60-74 years 1-dose series) Never done    Shingles vaccine (3 of 3) 01/20/2021    Diabetic foot exam  11/13/2021    COVID-19 Vaccine (4 - 2024-25 season) 09/01/2024    Annual Wellness Visit (Medicare Advantage)  01/01/2025    Depression Screen  05/27/2025    Diabetic Alb to Cr ratio (uACR) test  05/29/2025    Lipids  05/29/2025    GFR test (Diabetes, CKD 3-4, OR last GFR 15-59)  05/29/2025    Flu vaccine (Season Ended) 08/01/2025    A1C test (Diabetic or Prediabetic)  12/04/2025    Colorectal Cancer Screen  01/22/2026    DTaP/Tdap/Td vaccine (2 - Td or Tdap) 11/25/2030    Pneumococcal 50+ years Vaccine  Completed    Hepatitis A vaccine  Aged Out    Hepatitis B vaccine  Aged Out    Hib vaccine  Aged Out    Polio vaccine  Aged Out    Meningococcal (ACWY) vaccine  Aged Out    Meningococcal B vaccine  Aged Out      Chief Complaint:   Steven M Bleser, Dr. is a 74 y.o. male who presents for Medicare Annual Wellness Visit     #Diabetes   Lab Results   Component

## 2025-06-04 NOTE — PROGRESS NOTES
Medicare Annual Wellness Visit    Steven M Bleser, Dr. is here for Medicare AWV    Assessment & Plan   Medicare annual wellness visit, subsequent  Type 2 diabetes mellitus with complication, without long-term current use of insulin (HCC)  -     POCT glycosylated hemoglobin (Hb A1C)  -     SITagliptin-metFORMIN (JANUMET XR)  MG TB24 per extended release tablet; Take 1 tablet by mouth in the morning and at bedtime, Disp-180 tablet, R-1Normal  -     Vitamin B12; Future  -     Comprehensive Metabolic Panel; Future  -     CBC with Auto Differential; Future  -     PSA, Prostatic Specific Antigen ([place]); Future  -     Albumin/Creatinine Ratio, Urine; Future  -     Lipid Panel; Future  Subclinical hypothyroidism  -     Vitamin B12; Future  -     Comprehensive Metabolic Panel; Future  -     CBC with Auto Differential; Future  -     PSA, Prostatic Specific Antigen ([place]); Future  -     Albumin/Creatinine Ratio, Urine; Future  -     Lipid Panel; Future  History of prostatitis  -     Vitamin B12; Future  -     Comprehensive Metabolic Panel; Future  -     CBC with Auto Differential; Future  -     PSA, Prostatic Specific Antigen ([place]); Future  -     Albumin/Creatinine Ratio, Urine; Future  -     Lipid Panel; Future  Primary hypertension  -     SITagliptin-metFORMIN (JANUMET XR)  MG TB24 per extended release tablet; Take 1 tablet by mouth in the morning and at bedtime, Disp-180 tablet, R-1Normal  -     Vitamin B12; Future  -     Comprehensive Metabolic Panel; Future  -     CBC with Auto Differential; Future  -     PSA, Prostatic Specific Antigen ([place]); Future  -     Albumin/Creatinine Ratio, Urine; Future  -     Lipid Panel; Future  Paroxysmal atrial fibrillation (HCC)  -     Vitamin B12; Future  -     Comprehensive Metabolic Panel; Future  -     CBC with Auto Differential; Future  -     PSA, Prostatic Specific Antigen ([place]); Future  -     Albumin/Creatinine Ratio,

## 2025-06-11 DIAGNOSIS — R53.83 FATIGUE, UNSPECIFIED TYPE: ICD-10-CM

## 2025-06-13 ENCOUNTER — RESULTS FOLLOW-UP (OUTPATIENT)
Dept: FAMILY MEDICINE CLINIC | Age: 75
End: 2025-06-13

## 2025-06-13 LAB
SHBG SERPL-SCNC: 34 NMOL/L (ref 19–76)
TESTOST FREE SERPL-MCNC: 31.2 PG/ML (ref 47–244)
TESTOST SERPL-MCNC: 169 NG/DL (ref 193–740)

## 2025-06-30 ENCOUNTER — OFFICE VISIT (OUTPATIENT)
Dept: FAMILY MEDICINE CLINIC | Age: 75
End: 2025-06-30

## 2025-06-30 ENCOUNTER — APPOINTMENT (OUTPATIENT)
Dept: GENERAL RADIOLOGY | Age: 75
End: 2025-06-30
Payer: MEDICARE

## 2025-06-30 ENCOUNTER — HOSPITAL ENCOUNTER (EMERGENCY)
Age: 75
Discharge: HOME | End: 2025-06-30
Payer: MEDICARE

## 2025-06-30 ENCOUNTER — TELEPHONE (OUTPATIENT)
Dept: FAMILY MEDICINE CLINIC | Age: 75
End: 2025-06-30

## 2025-06-30 VITALS
BODY MASS INDEX: 26.77 KG/M2 | RESPIRATION RATE: 16 BRPM | WEIGHT: 187 LBS | DIASTOLIC BLOOD PRESSURE: 76 MMHG | HEIGHT: 70 IN | HEART RATE: 66 BPM | OXYGEN SATURATION: 96 % | SYSTOLIC BLOOD PRESSURE: 118 MMHG

## 2025-06-30 VITALS
TEMPERATURE: 97.6 F | SYSTOLIC BLOOD PRESSURE: 124 MMHG | WEIGHT: 188.27 LBS | HEIGHT: 70 IN | HEART RATE: 80 BPM | RESPIRATION RATE: 16 BRPM | DIASTOLIC BLOOD PRESSURE: 72 MMHG | BODY MASS INDEX: 26.95 KG/M2 | OXYGEN SATURATION: 99 %

## 2025-06-30 DIAGNOSIS — M43.10 RETROLISTHESIS: Primary | ICD-10-CM

## 2025-06-30 DIAGNOSIS — G83.4 CAUDA EQUINA SYNDROME NOT AFFECTING BLADDER (HCC): Primary | ICD-10-CM

## 2025-06-30 DIAGNOSIS — M43.10 RETROLISTHESIS: ICD-10-CM

## 2025-06-30 DIAGNOSIS — Z98.890 HISTORY OF LUMBAR LAMINECTOMY: ICD-10-CM

## 2025-06-30 DIAGNOSIS — G89.29 CHRONIC MIDLINE LOW BACK PAIN WITHOUT SCIATICA: ICD-10-CM

## 2025-06-30 DIAGNOSIS — M79.2 LEG NEURALGIA, LEFT: ICD-10-CM

## 2025-06-30 DIAGNOSIS — M54.50 CHRONIC MIDLINE LOW BACK PAIN WITHOUT SCIATICA: ICD-10-CM

## 2025-06-30 LAB
BACTERIA URNS QL MICRO: ABNORMAL /HPF
BILIRUB UR QL STRIP.AUTO: NEGATIVE
CLARITY UR: ABNORMAL
COLOR UR: YELLOW
EPI CELLS #/AREA URNS AUTO: 0 /HPF (ref 0–5)
GLUCOSE UR STRIP.AUTO-MCNC: >=1000 MG/DL
HGB UR QL STRIP.AUTO: ABNORMAL
HYALINE CASTS #/AREA URNS AUTO: 2 /LPF (ref 0–8)
KETONES UR STRIP.AUTO-MCNC: ABNORMAL MG/DL
LEUKOCYTE ESTERASE UR QL STRIP.AUTO: ABNORMAL
NITRITE UR QL STRIP.AUTO: NEGATIVE
PH UR STRIP.AUTO: 5 [PH] (ref 5–8)
PROT UR STRIP.AUTO-MCNC: ABNORMAL MG/DL
RBC CLUMPS #/AREA URNS AUTO: 0 /HPF (ref 0–4)
SP GR UR STRIP.AUTO: 1.03 (ref 1–1.03)
UA COMPLETE W REFLEX CULTURE PNL UR: YES
UA DIPSTICK W REFLEX MICRO PNL UR: YES
URN SPEC COLLECT METH UR: ABNORMAL
UROBILINOGEN UR STRIP-ACNC: 0.2 E.U./DL
WBC #/AREA URNS AUTO: 179 /HPF (ref 0–5)

## 2025-06-30 PROCEDURE — 6360000002 HC RX W HCPCS

## 2025-06-30 PROCEDURE — 87086 URINE CULTURE/COLONY COUNT: CPT

## 2025-06-30 PROCEDURE — 73502 X-RAY EXAM HIP UNI 2-3 VIEWS: CPT

## 2025-06-30 PROCEDURE — 72100 X-RAY EXAM L-S SPINE 2/3 VWS: CPT

## 2025-06-30 PROCEDURE — 6370000000 HC RX 637 (ALT 250 FOR IP)

## 2025-06-30 PROCEDURE — 99284 EMERGENCY DEPT VISIT MOD MDM: CPT

## 2025-06-30 PROCEDURE — 81001 URINALYSIS AUTO W/SCOPE: CPT

## 2025-06-30 RX ORDER — CEFUROXIME AXETIL 500 MG/1
500 TABLET ORAL 2 TIMES DAILY
Qty: 14 TABLET | Refills: 0 | Status: SHIPPED | OUTPATIENT
Start: 2025-06-30 | End: 2025-07-07

## 2025-06-30 RX ORDER — DEXAMETHASONE 4 MG/1
10 TABLET ORAL ONCE
Status: COMPLETED | OUTPATIENT
Start: 2025-06-30 | End: 2025-06-30

## 2025-06-30 RX ORDER — OXYCODONE AND ACETAMINOPHEN 5; 325 MG/1; MG/1
1 TABLET ORAL EVERY 6 HOURS PRN
Qty: 12 TABLET | Refills: 0 | Status: SHIPPED | OUTPATIENT
Start: 2025-06-30 | End: 2025-07-03

## 2025-06-30 RX ORDER — ACETAMINOPHEN 325 MG/1
650 TABLET ORAL ONCE
Status: COMPLETED | OUTPATIENT
Start: 2025-06-30 | End: 2025-06-30

## 2025-06-30 RX ORDER — PREDNISONE 10 MG/1
10 TABLET ORAL 2 TIMES DAILY
Qty: 10 TABLET | Refills: 0 | Status: SHIPPED | OUTPATIENT
Start: 2025-06-30 | End: 2025-07-05

## 2025-06-30 RX ORDER — OXYCODONE AND ACETAMINOPHEN 5; 325 MG/1; MG/1
1 TABLET ORAL ONCE
Refills: 0 | Status: COMPLETED | OUTPATIENT
Start: 2025-06-30 | End: 2025-06-30

## 2025-06-30 RX ADMIN — ACETAMINOPHEN 650 MG: 325 TABLET ORAL at 09:50

## 2025-06-30 RX ADMIN — OXYCODONE HYDROCHLORIDE AND ACETAMINOPHEN 1 TABLET: 5; 325 TABLET ORAL at 11:27

## 2025-06-30 RX ADMIN — DEXAMETHASONE 10 MG: 4 TABLET ORAL at 09:50

## 2025-06-30 ASSESSMENT — PAIN SCALES - GENERAL
PAINLEVEL_OUTOF10: 7
PAINLEVEL_OUTOF10: 8

## 2025-06-30 ASSESSMENT — PAIN - FUNCTIONAL ASSESSMENT: PAIN_FUNCTIONAL_ASSESSMENT: 0-10

## 2025-06-30 ASSESSMENT — PAIN DESCRIPTION - LOCATION: LOCATION: LEG

## 2025-06-30 ASSESSMENT — PAIN DESCRIPTION - ORIENTATION: ORIENTATION: LEFT

## 2025-06-30 NOTE — ED PROVIDER NOTES
medications which have NOT CHANGED    Details   apixaban (ELIQUIS) 5 MG TABS tablet Take by mouth 2 times dailyHistorical Med      SITagliptin-metFORMIN (JANUMET XR)  MG TB24 per extended release tablet Take 1 tablet by mouth in the morning and at bedtime, Disp-180 tablet, R-1Normal      glipiZIDE (GLUCOTROL) 10 MG tablet TAKE 1 TABLET BY MOUTH TWICE DAILY BEFORE MEAL(S), Disp-180 tablet, R-0Normal      atorvastatin (LIPITOR) 40 MG tablet Take 1 tablet by mouth once daily, Disp-90 tablet, R-0Normal      tadalafil (CIALIS) 5 MG tablet TAKE 1 TABLET BY MOUTH ONCE DAILY AS NEEDED FOR ERECTILE DYSFUNCTION, Disp-90 tablet, R-0Normal      gabapentin (NEURONTIN) 300 MG capsule Take 1 capsule by mouth 2 times daily for 90 days., Disp-180 capsule, R-0Normal      losartan (COZAAR) 25 MG tablet TAKE 1 TABLET BY MOUTH ONCE DAILY, Disp-90 tablet, R-1Normal      empagliflozin (JARDIANCE) 25 MG tablet Take 1 tablet by mouth once daily, Disp-90 tablet, R-1Normal      ciprofloxacin (CIPRO) 500 MG tablet Take 1 tablet by mouth twice daily for 10 days, Disp-20 tablet, R-2Normal      cholestyramine (QUESTRAN) 4 g packet Take 1 packet by mouth 2 times daily, Disp-180 packet, R-1Please dispense sugar free productNO PRINT      sildenafil (REVATIO) 20 MG tablet TAKE 4 TO 5 TABLETS BY MOUTH ONCE DAILY AS NEEDED, Disp-30 tablet, R-0Normal      Misc Natural Products (NEURIVA PO) Take by mouthHistorical Med      sildenafil (VIAGRA) 100 MG tablet Take 1 tablet by mouth as needed for Erectile DysfunctionHistorical Med      Vitamin A 2400 MCG (8000 UT) CAPS Take by mouthHistorical Med      Melatonin 10 MG TABS Take by mouthHistorical Med      b complex vitamins capsule Take 1 capsule by mouth dailyHistorical Med      metoprolol succinate (TOPROL XL) 25 MG extended release tablet TAKE 1 TABLET BY MOUTH ONCE DAILYHistorical Med      zinc gluconate 50 MG tablet Take 1 tablet by mouth dailyHistorical Med      niacin 500 MG tablet Take 1 tablet

## 2025-06-30 NOTE — ED NOTES
Pt confirmed d/c paperwork has correct name. Discharge and education instructions reviewed with patient. Teach-back successful.  Pt verbalized understanding and denied questions at this time. No acute distress noted. Patient instructed to follow-up as noted - return to emergency department if symptoms worsen. Patient verbalized understanding. Discharged per EDMD with discharge instructions. Pt discharged to private vehicle. Patient stable upon departure. Thanked patient for choosing [facility] for care. Provider aware of patient pain at time of discharge.

## 2025-06-30 NOTE — PROGRESS NOTES
Chief Complaint   Patient presents with    Leg Pain     Pt states not sciatic pain, but has trochanter pain and achy pain in his knee and lateral shin above the ankle.  Was given ref to [facility] and they said they wont see him until he has an MRI       Subjective:    Patient here for follow-up from ER.  X-rays today:   IMPRESSION:  1. Retrolisthesis of L3 on L4 and L2 on L3, each measuring 8 mm.  2. Multilevel degenerative change in the lumbar spine with anterior osteophyte formation, loss of disc space, and bilateral facet arthropathy.  Mild loss of joint space in the left hip, compatible with osteoarthritis.   Treatment: steroid PO, APAP, oxycodone  At present: 10 days left buttox, slower gait.  Feet tingling about the same.  Denies incontinence  Had 1 chiro treatment yesterday. Ultrasound therapy x 7 in past 10 days.  3 yrs S/P lumbar laminectomy. Feet still tingle a little but otherwise.    Has flexeril, oxycodone.    CHART REVIEW  MRI 5/19/22 : Multilevel lumbar spondylosis, most pronounced at L3-4 and L4-5 resulting  in severe canal stenosis with corresponding redundancy of the cauda equina  nerve roots. Severe bilateral foraminal stenosis at L5-S1.    Health Maintenance Due   Topic Date Due    Diabetic retinal exam  Never done    Hepatitis C screen  Never done    Respiratory Syncytial Virus (RSV) Pregnant or age 60 yrs+ (1 - Risk 60-74 years 1-dose series) Never done    Shingles vaccine (3 of 3) 01/20/2021    Diabetic foot exam  11/13/2021    COVID-19 Vaccine (4 - 2024-25 season) 09/01/2024     The ASCVD Risk score (Td CASTAÑEDA, et al., 2019) failed to calculate for the following reasons:    The valid total cholesterol range is 130 to 320 mg/dL  Prior to Visit Medications    Medication Sig Taking? Authorizing Provider   oxyCODONE-acetaminophen (PERCOCET) 5-325 MG per tablet Take 1 tablet by mouth every 6 hours as needed for Pain for up to 3 days. Intended supply: 3 days. Take lowest dose possible to manage pain

## 2025-06-30 NOTE — DISCHARGE INSTRUCTIONS
Please follow-up with Dr. Arrington as soon as possible for reassessment during the visit    Percocet was prescribed, please be aware that this medication can cause drowsiness, with driving, he may change or use    Return to the ED if you notice urinary or bowel incontinence/retention, genital numbness, fevers, lower extremity weakness or any other symptoms

## 2025-06-30 NOTE — TELEPHONE ENCOUNTER
Per ER note: Neurosurgery was consulted, I spoke with Hema Roy NP, patient presents neurovascular intact, he does not require transferring or further imaging, patient can be managed outpatient with Dr. Asencio.     Could we call [facility] and ask if could be seen Since their NP wanted pt to see Dr. Asencio?

## 2025-07-01 LAB — BACTERIA UR CULT: NORMAL

## 2025-07-01 NOTE — TELEPHONE ENCOUNTER
Can you put in a ref for [facility] dr rushing I called and spoke with them and they need the ref before they can start working on this.

## 2025-07-10 ENCOUNTER — TELEPHONE (OUTPATIENT)
Dept: FAMILY MEDICINE CLINIC | Age: 75
End: 2025-07-10

## 2025-07-10 ENCOUNTER — OFFICE VISIT (OUTPATIENT)
Dept: FAMILY MEDICINE CLINIC | Age: 75
End: 2025-07-10

## 2025-07-10 VITALS
SYSTOLIC BLOOD PRESSURE: 103 MMHG | WEIGHT: 186 LBS | BODY MASS INDEX: 26.63 KG/M2 | HEART RATE: 80 BPM | RESPIRATION RATE: 16 BRPM | OXYGEN SATURATION: 97 % | HEIGHT: 70 IN | DIASTOLIC BLOOD PRESSURE: 60 MMHG

## 2025-07-10 DIAGNOSIS — M54.50 ACUTE LEFT-SIDED LOW BACK PAIN WITHOUT SCIATICA: Primary | ICD-10-CM

## 2025-07-10 RX ORDER — TRIAMCINOLONE ACETONIDE 40 MG/ML
40 INJECTION, SUSPENSION INTRA-ARTICULAR; INTRAMUSCULAR ONCE
Status: COMPLETED | OUTPATIENT
Start: 2025-07-10 | End: 2025-07-10

## 2025-07-10 RX ADMIN — TRIAMCINOLONE ACETONIDE 40 MG: 40 INJECTION, SUSPENSION INTRA-ARTICULAR; INTRAMUSCULAR at 10:29

## 2025-07-10 NOTE — TELEPHONE ENCOUNTER
Please let Sachin know I'm calling this SI joint dysfunction unless proven otherwise by the MRI  I would take 2 decadron for 2 days, 1 for 3 days, then stop

## 2025-07-10 NOTE — TELEPHONE ENCOUNTER
Pt wanted to know if he should still take Decadron? If so how much and how often?   Pt also wanted to know what you believe his diagnoses is.    None

## 2025-07-12 DIAGNOSIS — E11.8 TYPE 2 DIABETES MELLITUS WITH COMPLICATION, WITHOUT LONG-TERM CURRENT USE OF INSULIN (HCC): ICD-10-CM

## 2025-07-14 RX ORDER — EMPAGLIFLOZIN 25 MG/1
25 TABLET, FILM COATED ORAL DAILY
Qty: 90 TABLET | Refills: 0 | Status: SHIPPED | OUTPATIENT
Start: 2025-07-14

## 2025-07-15 ENCOUNTER — HOSPITAL ENCOUNTER (OUTPATIENT)
Dept: MRI IMAGING | Age: 75
Discharge: HOME | End: 2025-07-15
Attending: FAMILY MEDICINE
Payer: MEDICARE

## 2025-07-15 DIAGNOSIS — N40.1 BPH ASSOCIATED WITH NOCTURIA: ICD-10-CM

## 2025-07-15 DIAGNOSIS — Z98.890 HISTORY OF LUMBAR LAMINECTOMY: ICD-10-CM

## 2025-07-15 DIAGNOSIS — M79.2 LEG NEURALGIA, LEFT: ICD-10-CM

## 2025-07-15 DIAGNOSIS — R35.1 BPH ASSOCIATED WITH NOCTURIA: ICD-10-CM

## 2025-07-15 DIAGNOSIS — G83.4 CAUDA EQUINA SYNDROME NOT AFFECTING BLADDER (HCC): ICD-10-CM

## 2025-07-15 DIAGNOSIS — M43.10 RETROLISTHESIS: ICD-10-CM

## 2025-07-15 DIAGNOSIS — N52.8 OTHER MALE ERECTILE DYSFUNCTION: ICD-10-CM

## 2025-07-15 PROCEDURE — 6360000004 HC RX CONTRAST MEDICATION: Performed by: FAMILY MEDICINE

## 2025-07-15 PROCEDURE — 72158 MRI LUMBAR SPINE W/O & W/DYE: CPT

## 2025-07-15 PROCEDURE — A9579 GAD-BASE MR CONTRAST NOS,1ML: HCPCS | Performed by: FAMILY MEDICINE

## 2025-07-15 RX ORDER — GADOTERIDOL 279.3 MG/ML
20 INJECTION INTRAVENOUS
Status: COMPLETED | OUTPATIENT
Start: 2025-07-15 | End: 2025-07-15

## 2025-07-15 RX ADMIN — GADOTERIDOL 16 ML: 279.3 INJECTION, SOLUTION INTRAVENOUS at 07:50

## 2025-07-16 RX ORDER — TADALAFIL 5 MG/1
TABLET ORAL
Qty: 90 TABLET | Refills: 0 | Status: SHIPPED | OUTPATIENT
Start: 2025-07-16

## 2025-07-21 ENCOUNTER — OFFICE VISIT (OUTPATIENT)
Age: 75
End: 2025-07-21
Payer: MEDICARE

## 2025-07-21 DIAGNOSIS — L57.0 ACTINIC KERATOSIS: ICD-10-CM

## 2025-07-21 DIAGNOSIS — Z85.828 HISTORY OF BASAL CELL CARCINOMA: ICD-10-CM

## 2025-07-21 DIAGNOSIS — D22.9 MULTIPLE NEVI: Primary | ICD-10-CM

## 2025-07-21 DIAGNOSIS — L81.4 SOLAR LENTIGINOSIS: ICD-10-CM

## 2025-07-21 PROCEDURE — 1159F MED LIST DOCD IN RCRD: CPT | Performed by: DERMATOLOGY

## 2025-07-21 PROCEDURE — 17000 DESTRUCT PREMALG LESION: CPT | Performed by: DERMATOLOGY

## 2025-07-21 PROCEDURE — 1123F ACP DISCUSS/DSCN MKR DOCD: CPT | Performed by: DERMATOLOGY

## 2025-07-21 PROCEDURE — G8417 CALC BMI ABV UP PARAM F/U: HCPCS | Performed by: DERMATOLOGY

## 2025-07-21 PROCEDURE — G8427 DOCREV CUR MEDS BY ELIG CLIN: HCPCS | Performed by: DERMATOLOGY

## 2025-07-21 PROCEDURE — 1160F RVW MEDS BY RX/DR IN RCRD: CPT | Performed by: DERMATOLOGY

## 2025-07-21 PROCEDURE — 3017F COLORECTAL CA SCREEN DOC REV: CPT | Performed by: DERMATOLOGY

## 2025-07-21 PROCEDURE — 1036F TOBACCO NON-USER: CPT | Performed by: DERMATOLOGY

## 2025-07-21 PROCEDURE — 99213 OFFICE O/P EST LOW 20 MIN: CPT | Performed by: DERMATOLOGY

## 2025-07-21 PROCEDURE — 17003 DESTRUCT PREMALG LES 2-14: CPT | Performed by: DERMATOLOGY

## 2025-07-21 NOTE — PROGRESS NOTES
[facility]  Mike Lang MD  [phone]      Steven M Bleser, Dr.  1950    74 y.o. male     Date of Visit: 7/21/2025    Chief Complaint: skin lesions of concern    History of Present Illness:    1.  He presents today for evaluation of multiple moles - not aware of any changes in size, color or shape.       2.  He has stable freckling on the shoulders and extremities.      3.  He has persistent scaly lesions on the frontal scalp.    4.  He has a history of BCCs - denies any signs of recurrence.     Derm Hx:      He also has a history of chronic acne rosacea - [employer] rosacea triple cream with azelaic acid 15%, ivermectin 1% and metronidazole 1% cream.     Superficial BCC of the right mid shin - treated with curettage on 12/8/23.   Small nodular and superficial BCC of the left forearm-treated with electrodesiccation and curettage on 4/29/2022.  Nodular basal cell carcinoma of the left postauricular region-treated with Mohs by Dr. Vidales on 1/21/2020.  Nodular BCC on the left central cheek-treated with Mohs by Dr. Vidales on 8/6/2019.  Nodular BCC on the R nasal tip-treated with Mohs by Dr. Vidales on 10/2/2018.  Nodular BCC of the left nasal ala-treated with Mohs by Dr. Vazquez in April 2014.  Superficial BCC of the right shoulder-treated with curettage in April 2014.  BCC of the right mid cheek-treated with Mohs by Dr. Vazquez in Sept 2012.  Pigmented nodular BCC the right mandible-treated with Mohs in Dec 2011.  Superficial BCC of the right cheek-treated with Mohs by Dr. Vazquez in December 2009.     On Eliquis.     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   Has a home in [place].        Review of Systems:  Gen: Feels well, good sense of health.    Past Medical History, Family History, Surgical History, Medications and Allergies reviewed.    Past Medical History:   Diagnosis Date    Cancer (HCC)     NBCC R nasal tip    Diabetes mellitus (HCC)     Fracture cervical

## 2025-07-24 RX ORDER — LOSARTAN POTASSIUM 25 MG/1
25 TABLET ORAL DAILY
Qty: 90 TABLET | Refills: 1 | Status: SHIPPED | OUTPATIENT
Start: 2025-07-24

## 2025-08-01 RX ORDER — ATORVASTATIN CALCIUM 40 MG/1
40 TABLET, FILM COATED ORAL DAILY
Qty: 90 TABLET | Refills: 0 | Status: SHIPPED | OUTPATIENT
Start: 2025-08-01

## 2025-08-20 DIAGNOSIS — E11.8 TYPE 2 DIABETES MELLITUS WITH COMPLICATION, WITHOUT LONG-TERM CURRENT USE OF INSULIN (HCC): ICD-10-CM

## 2025-08-20 RX ORDER — GLIPIZIDE 10 MG/1
TABLET ORAL
Qty: 180 TABLET | Refills: 1 | Status: SHIPPED | OUTPATIENT
Start: 2025-08-20

## 2025-08-22 ENCOUNTER — OFFICE VISIT (OUTPATIENT)
Dept: FAMILY MEDICINE CLINIC | Age: 75
End: 2025-08-22
Payer: MEDICARE

## 2025-08-22 VITALS
DIASTOLIC BLOOD PRESSURE: 64 MMHG | RESPIRATION RATE: 16 BRPM | SYSTOLIC BLOOD PRESSURE: 103 MMHG | OXYGEN SATURATION: 98 % | HEART RATE: 70 BPM

## 2025-08-22 DIAGNOSIS — M54.31 SCIATICA, RIGHT SIDE: Primary | ICD-10-CM

## 2025-08-22 PROCEDURE — 1036F TOBACCO NON-USER: CPT | Performed by: FAMILY MEDICINE

## 2025-08-22 PROCEDURE — G8427 DOCREV CUR MEDS BY ELIG CLIN: HCPCS | Performed by: FAMILY MEDICINE

## 2025-08-22 PROCEDURE — G8417 CALC BMI ABV UP PARAM F/U: HCPCS | Performed by: FAMILY MEDICINE

## 2025-08-22 PROCEDURE — 3017F COLORECTAL CA SCREEN DOC REV: CPT | Performed by: FAMILY MEDICINE

## 2025-08-22 PROCEDURE — 1123F ACP DISCUSS/DSCN MKR DOCD: CPT | Performed by: FAMILY MEDICINE

## 2025-08-22 PROCEDURE — 3078F DIAST BP <80 MM HG: CPT | Performed by: FAMILY MEDICINE

## 2025-08-22 PROCEDURE — 3074F SYST BP LT 130 MM HG: CPT | Performed by: FAMILY MEDICINE

## 2025-08-22 PROCEDURE — 99213 OFFICE O/P EST LOW 20 MIN: CPT | Performed by: FAMILY MEDICINE

## 2025-08-22 PROCEDURE — 1159F MED LIST DOCD IN RCRD: CPT | Performed by: FAMILY MEDICINE

## 2025-08-22 RX ORDER — TRIAMCINOLONE ACETONIDE 40 MG/ML
40 INJECTION, SUSPENSION INTRA-ARTICULAR; INTRAMUSCULAR ONCE
Status: COMPLETED | OUTPATIENT
Start: 2025-08-22 | End: 2025-08-22

## 2025-08-22 RX ADMIN — TRIAMCINOLONE ACETONIDE 40 MG: 40 INJECTION, SUSPENSION INTRA-ARTICULAR; INTRAMUSCULAR at 16:51

## (undated) DEVICE — SURGICAL SUCTION CONNECTING TUBE WITH MALE CONNECTOR AND SUCTION CLAMP, 2 FT. LONG (.6 M), 5 MM I.D.: Brand: CONMED

## (undated) DEVICE — UNDERGLOVE SURG SZ 8 FNGR THK0.21MIL GRN LTX BEAD CUF

## (undated) DEVICE — SHEET, T, LAPAROTOMY, STERILE: Brand: MEDLINE

## (undated) DEVICE — SOLUTION SCRB 4OZ 10% POVIDONE IOD ANTIMIC BTL

## (undated) DEVICE — SUTURE VCRL + SZ 0 L18IN ABSRB UD L36MM CT-1 1/2 CIR VCP840D

## (undated) DEVICE — KIT JACK TBL PT CARE

## (undated) DEVICE — SYRINGE MED 30ML STD CLR PLAS LUERLOCK TIP N CTRL DISP

## (undated) DEVICE — 1016 S-DRAPE IRRIG POUCH 10/BOX: Brand: STERI-DRAPE™

## (undated) DEVICE — C-ARM: Brand: UNBRANDED

## (undated) DEVICE — SYRINGE 20ML LL S/C 50

## (undated) DEVICE — ELECTRODE PT RET AD L9FT HI MOIST COND ADH HYDRGEL CORDED

## (undated) DEVICE — SYRINGE IRRIG 60ML SFT PLIABLE BLB EZ TO GRP 1 HND USE W/

## (undated) DEVICE — SUTURE VCRL + SZ 0 L27IN ABSRB UD CT-1 L36MM 1/2 CIR TAPR VCP260H

## (undated) DEVICE — FORCEPS BX 240CM 2.4MM L NDL RAD JAW 4 M00513334

## (undated) DEVICE — TOWEL,OR,DSP,ST,BLUE,STD,4/PK,20PK/CS: Brand: MEDLINE

## (undated) DEVICE — MICRO KOVER: Brand: UNBRANDED

## (undated) DEVICE — PREMIUM DRY TRAY LF: Brand: MEDLINE INDUSTRIES, INC.

## (undated) DEVICE — PAD,NON-ADHERENT,3X8,STERILE,LF,1/PK: Brand: MEDLINE

## (undated) DEVICE — SOLUTION PREP POVIDONE IOD FOR SKIN MUCOUS MEM PRIOR TO

## (undated) DEVICE — GLOVE SURG SZ 8 CRM LTX FREE POLYISOPRENE POLYMER BEAD ANTI

## (undated) DEVICE — SUTURE ETHLN SZ 3-0 L18IN NONABSORBABLE BLK FS-1 L24MM 3/8 663H

## (undated) DEVICE — INTENDED FOR TISSUE SEPARATION, AND OTHER PROCEDURES THAT REQUIRE A SHARP SURGICAL BLADE TO PUNCTURE OR CUT.: Brand: BARD-PARKER ® STAINLESS STEEL BLADES

## (undated) DEVICE — NEEDLE HYPO 25GA L1.5IN BVL ORIENTED ECLIPSE

## (undated) DEVICE — SUTURE VCRL + SZ 2-0 L18IN ABSRB UD CT1 L36MM 1/2 CIR VCP839D

## (undated) DEVICE — SOLUTION IV IRRIG 500ML 0.9% SODIUM CHL 2F7123

## (undated) DEVICE — GOWN SIRUS NONREIN LG W/TWL: Brand: MEDLINE INDUSTRIES, INC.

## (undated) DEVICE — GOWN,AURORA,NONREINF,RAGLAN,XXL,STERILE: Brand: MEDLINE

## (undated) DEVICE — NEURO SPINE: Brand: MEDLINE INDUSTRIES, INC.

## (undated) DEVICE — CODMAN® SURGICAL PATTIES 1" X 1" (2.54CM X 2.54CM): Brand: CODMAN®

## (undated) DEVICE — TOOL 14MH30 LEGEND 14CM 3MM: Brand: MIDAS REX ™

## (undated) DEVICE — TOTAL TRAY, 16FR 10ML SIL FOLEY, URN: Brand: MEDLINE